# Patient Record
Sex: MALE
[De-identification: names, ages, dates, MRNs, and addresses within clinical notes are randomized per-mention and may not be internally consistent; named-entity substitution may affect disease eponyms.]

---
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
## 2022-01-27 ENCOUNTER — HOSPITAL ENCOUNTER (OUTPATIENT)
Dept: PHYSICAL THERAPY | Age: 22
Setting detail: THERAPIES SERIES
Discharge: HOME | End: 2022-01-27
Payer: COMMERCIAL

## 2022-01-27 PROCEDURE — 97161 PT EVAL LOW COMPLEX 20 MIN: CPT

## 2022-01-27 PROCEDURE — 97140 MANUAL THERAPY 1/> REGIONS: CPT

## 2022-01-27 PROCEDURE — 97112 NEUROMUSCULAR REEDUCATION: CPT

## 2022-01-27 NOTE — PLAN OF CARE
Laurakika GaganmikeAmarilis dickson  Phone: [phone]   Fax:     [phone]                                                       Physical Therapy Certification    Dear Referring Practitioner: Hailee Mcfarlane,    We had the pleasure of evaluating the following patient for physical therapy services at [address] [place] [address]. A summary of our findings can be found in the initial assessment below. This includes our plan of care. If you have any questions or concerns regarding these findings, please do not hesitate to contact me at the office phone number checked above.   Thank you for the referral.       Physician Signature:_______________________________Date:__________________  By signing above (or electronic signature), therapists plan is approved by physician      Patient: Leona Angel   : 2000   MRN: [id number]  Referring Physician: Referring Practitioner: Hailee Mcfarlane      Evaluation Date: 2022      Medical Diagnosis Information:  Diagnosis: Z48.89 encounter for other specified surgical aftercare   Treatment Diagnosis: M25.562, M25.662, M62.559, R53.1, R26.9                                         Insurance information: PT Insurance Information: BCBS; DED NOT MET; DN NOT COVERED; 80/20%; 60 VISITS/YEAR, 7 USED     Precautions/ Contra-indications: s/p L ACLr w/ patellar tendon graft,  DOS: 2021  Latex Allergy:  [x]NO      []YES  Preferred Language for Healthcare:   [x]English       []other:    C-SSRS Triggered by Intake questionnaire (Past 2 wk assessment ):   [x] No, Questionnaire did not trigger screening.   [] Yes, Patient intake triggered C-SSRS Screening      [] C-SSRS Screening completed  [] PCP notified via Epic     SUBJECTIVE: Patient stated complaint: Pt presents for evaluation after transferring care from PT clinic at home in [facility] following L ACLr w/ patellar tendon graft. Pt also reports MCL and LCL sprains. States he had an initial injury on Oct 26, 2021 while playing football. 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 Was on crutches after initial injury and received surgery when he went home for winter break. States he has been having a hard time restoring full knee ROM due to significant tightness through all parts of his leg and some pain in patellar tendon region. States he has been able to get it to almost touch the table after working on it. Still has some pain w/ quad activation. Ambulating w/ single crutch and knee immobilizer locked at available end range extension.     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[place] [address],[address] Level of Function: Independent with ADLs and IADLs; [employer] club football     OBJECTIVE:     ROM LEFT RIGHT   HIP Flex     HIP Abd     HIP Ext     HIP IR     HIP ER     Knee ext -18 0   Knee Flex 93 135   Ankle PF     Ankle DF     Ankle In     Ankle Ev     Strength  LEFT RIGHT   HIP Flexors     HIP Abductors 4 4   HIP Ext     Hip ER     Knee EXT (quad) NT 5   Knee Flex (HS) NT 5   Ankle DF     Ankle PF     Ankle Inv     Ankle EV          Circumference  Mid apex  7 cm prox             Reflexes/Sensation:    [x]Dermatomes/Myotomes intact    [x]Reflexes equal and normal bilaterally   []Other:    Joint mobility: significant mobility restrictions L knee and patella   []Normal    [x]Hypo   []Hyper    Palpation: TTP L IT band; patellar tendon    Functional Mobility/Transfers: unable to assess squat    Posture: WFL    Bandages/Dressings/Incisions: incisions healed    Gait: (include devices/WB status) unable to ambulate without crutch 2/2 lack of TKE and decreased quad strength    Orthopedic Special Tests: n/a                       [x] Patient history, allergies, meds reviewed. Medical chart reviewed. See intake form. Review Of Systems (ROS):  [x]Performed Review of systems (Integumentary, CardioPulmonary, Neurological) by intake and observation. Intake form has been scanned into medical record. Patient has been instructed to contact their primary care physician regarding ROS issues if not already being addressed at this time.       Co-morbidities/Complexities (which will affect course of rehabilitation):   []None           Arthritic conditions   []Rheumatoid arthritis (M05.9)  []Osteoarthritis (M19.91)   Cardiovascular conditions   [x]Hypertension (I10)  []Hyperlipidemia (E78.5)  []Angina pectoris (I20)  []Atherosclerosis (I70)  []CVA Musculoskeletal conditions   []Disc pathology   []Congenital spine pathologies   []Prior surgical intervention  []Osteoporosis (M81.8)  []Osteopenia (M85.8)   Endocrine conditions   []Hypothyroid (E03.9)  []Hyperthyroid Gastrointestinal conditions   []Constipation (M80.93)   Metabolic conditions   []Morbid obesity (E66.01)  []Diabetes type 1(E10.65) or 2 (E11.65)   []Neuropathy (G60.9)     Pulmonary conditions   []Asthma (J45)  []Coughing   []COPD (J44.9)   Psychological Disorders  []Anxiety (F41.9)  []Depression (F32.9)   []Other:   []Other:          Barriers to/and or personal factors that will affect rehab potential:              []Age  []Sex    []Smoker              []Motivation/Lack of Motivation                        []Co-Morbidities              []Cognitive Function, education/learning barriers              []Environmental, home barriers              []profession/work barriers  []past PT/medical experience  []other:  Justification:     Falls Risk Assessment (30 days):   [x] Falls Risk assessed and no intervention required. [] Falls Risk assessed and Patient requires intervention due to being higher risk   TUG score (>12s at risk):     [] Falls education provided, including         ASSESSMENT: Pt presents for evaluation of L knee s/p 6 weeks ACLr w/ patellar tendon graft. Pt presents today w/ significant ROM, mobility and strength deficits for post-op timeline, likely worse 2/2 waiting on surgery without any intervention. Patellar tendon mobility is very limited, in turn restricting patellar mobility. Still lacking quad activation preventing pt from being able to progress ambulation without assistance. Able to improve extension to 7 deg from 0 by end of session w/ overpressure. Pt may benefit from DN patellar tendon, quad tendon, and IT band, will request permission from referring provider. Pt to benefit from skilled PT to improve ROM, mobility, and strength limitations for assist in return to sport.    Functional Impairments:     [x]Noted lumbar/proximal hip/LE hypomobility   [x]Decreased LE functional ROM   [x]Decreased core/proximal hip strength and neuromuscular control   [x]Decreased LE functional strength   [x]Reduced balance/proprioceptive control   []other:      Functional Activity Limitations (from functional questionnaire and intake)   [x]Reduced ability to tolerate prolonged functional positions   [x]Reduced ability or difficulty with changes of positions or transfers between positions   [x]Reduced ability to maintain good posture and demonstrate good body mechanics with sitting, bending, and lifting   [x]Reduced ability to sleep   [x] Reduced ability or tolerance with driving and/or computer work   [x]Reduced ability to perform lifting, carrying tasks   [x]Reduced ability to squat   []Reduced ability to forward bend   [x]Reduced ability to ambulate prolonged functional periods/distances/surfaces   [x]Reduced ability to ascend/descend stairs   [x]Reduced ability to run, hop or jump   []other:     Participation Restrictions   [x]Reduced participation in self care activities   [x]Reduced participation in home management activities   []Reduced participation in work activities   [x]Reduced participation in social activities. [x]Reduced participation in sport activities. Classification :    [x]Signs/symptoms consistent with post-surgical status including decreased ROM, strength and function.    []Signs/symptoms consistent with joint sprain/strain   []Signs/symptoms consistent with patella-femoral syndrome   []Signs/symptoms consistent with knee OA/hip OA   []Signs/symptoms consistent with internal derangement of knee/Hip   []Signs/symptoms consistent with functional hip weakness/NMR control      []Signs/symptoms consistent with tendinitis/tendinosis    []signs/symptoms consistent with pathology which may benefit from Dry needling      []other:      Prognosis/Rehab Potential:      []Excellent   []Good    [x]Fair   []Poor    Tolerance of evaluation/treatment:    []Excellent   [x]Good    []Fair   []Poor    Physical Therapy Evaluation Complexity Justification  [x] A history of present problem with:  [x] no personal factors and/or comorbidities that impact the plan of care;  []1-2 personal factors and/or comorbidities that impact the plan of care  []3 personal factors and/or comorbidities that impact the plan of care  [x] An examination of body systems using standardized tests and measures addressing any of the following: body structures and functions (impairments), activity limitations, and/or participation restrictions;:  [x] a total of 1-2 or more elements   [] a total of 3 or more elements   [] a total of 4 or more elements   [x] A clinical presentation with:  [x] stable and/or uncomplicated characteristics   [] evolving clinical presentation with changing characteristics  [] unstable and unpredictable characteristics;   [x] Clinical decision making of [x] low, [] moderate, [] high complexity using standardized patient assessment instrument and/or measurable assessment of functional outcome. [x] EVAL (LOW) 21662 (typically 20 minutes face-to-face)  [] EVAL (MOD) 18029 (typically 30 minutes face-to-face)  [] EVAL (HIGH) 34774 (typically 45 minutes face-to-face)  [] RE-EVAL     PLAN:  Frequency/Duration:  2-3 days per week for 8 Weeks:  Interventions:  [x]  Therapeutic exercise including: strength training, ROM, for Lower extremity and core   [x]  NMR activation and proprioception for LE, Glutes and Core   [x]  Manual therapy as indicated for LE, Hip and spine to include: Dry Needling/IASTM, STM, PROM, Gr I-IV mobilizations, manipulation. [x] Modalities as needed that may include: thermal agents, E-stim, Biofeedback, US, iontophoresis as indicated  [x] Patient education on joint protection, postural re-education, activity modification, progression of HEP. HEP instruction: (see scanned forms)    GOALS:  Patient stated goal: \"get my leg straight and be able to walk without crutch\"  [] Progressing: [] Met: [x] Not Met: [] Adjusted    Therapist goals for Patient:   Short Term Goals: To be achieved in: 2 weeks  1. Independent in HEP and progression per patient tolerance, in order to prevent re-injury. [] Progressing: [] Met: [x] Not Met: [] Adjusted  2. Patient will have a decrease in pain to facilitate improvement in movement, function, and ADLs as indicated by Functional Deficits. [] Progressing: [] Met: [x] Not Met: [] Adjusted    Long Term Goals: To be achieved in: 8 weeks  1. Disability index score of 10% or less for the LEFS to assist with reaching prior level of function. [] Progressing: [] Met: [x] Not Met: [] Adjusted  2. Patient will demonstrate increased AROM to full L knee to allow for proper joint functioning as indicated by patients Functional Deficits.    [] Progressing: [] Met: [x] Not Met: [] Adjusted  3. Patient will demonstrate an increase in Strength to good proximal hip strength and control, within 5lb HHD in LE to allow for proper functional mobility as indicated by patients Functional Deficits. [] Progressing: [] Met: [x] Not Met: [] Adjusted  4. Patient will return to daily functional activities without increased symptoms or restriction. [] Progressing: [] Met: [x] Not Met: [] Adjusted  5. Pt will be able to ambulate without crutch and with full knee extension and quad recruitment without increased symptoms or restrictions.    [] Progressing: [] Met: [x] Not Met: [] Adjusted     Electronically signed by:  jT Rangel PT

## 2022-01-31 ENCOUNTER — HOSPITAL ENCOUNTER (OUTPATIENT)
Dept: PHYSICAL THERAPY | Age: 22
Setting detail: THERAPIES SERIES
Discharge: HOME | End: 2022-01-31
Payer: COMMERCIAL

## 2022-01-31 PROCEDURE — 97110 THERAPEUTIC EXERCISES: CPT

## 2022-01-31 PROCEDURE — 97112 NEUROMUSCULAR REEDUCATION: CPT

## 2022-01-31 PROCEDURE — 97140 MANUAL THERAPY 1/> REGIONS: CPT

## 2022-01-31 NOTE — FLOWSHEET NOTE
[place]  [address] [facility]  Phone: [phone] Fax: [phone]    Physical Therapy Treatment Note/ Progress Report:     Date:  2022    Patient Name:  Kathia Bull    :  2000  MRN: [id number]  Restrictions/Precautions:    Medical/Treatment Diagnosis Information:  · Diagnosis: Z48.89 encounter for other specified surgical aftercare  · Treatment Diagnosis: M25.562, M25.662, M62.559, R53.1, F85.2  Insurance/Certification information:  PT Insurance Information: BCBS; DED NOT MET; DN NOT COVERED; 80/20%; 60 VISITS/YEAR, 7 USED  Physician Information:  Referring Practitioner: Holly Estrada of care signed (Y/N):     Date of Patient follow up with Physician:      Progress Report: []  Yes  []  No     Date Range for reporting period:  Beginnin2022  Ending:      Progress report due (10 Rx/or 30 days whichever is less): 3/05/9802     Recertification due (POC duration/ or 90 days whichever is less): 2022     Visit # Insurance Allowable Auth Needed   1 60 ( 7 used) []Yes   []No     Latex Allergy:  [x]NO      []YES  Preferred Language for Healthcare:   [x]English       []other:  Functional Scale: LEFS: 33/80  Date assessed: 2022    Pain level:  0-4/10     SUBJECTIVE:  See eval    OBJECTIVE: See eval   Observation:    Test measurements:      RESTRICTIONS/PRECAUTIONS: s/p L ACLr w/ patellar tendon graft,  DOS: 2021    Exercises/Interventions:     Therapeutic Ex (96824)   Min: Sets/sec Reps Notes/CUES   Retro Stepper/BIKE 5'     Alter G      BFR      Sportcord March      3 way SLR      SAQ      Clam ABD      Hip Ext Lorrine Rounds      BOSU fwd/side lunge      BOSU squat      Leg Press Iso/Con/Ecc 0-      Cybex HS curl      TKE 5'' 15 3 pl   Glute side walks      RDL      Slide Lunge      Slide HS eccentrics      Step ups/ecc step down      Swissball wall rolls- in SLS- hip drive      Quad hip ext/wall-ball rolls Quad set 10 10''    Seated heel slides 10 10''    Manual Intervention (75446)  Min:      Knee mobs/PROM 5'  End range flex/ext   Tib/Fem Mobs 5'     Patella Mobs 5'  stiff   Ankle mobs      STM 10'  Patellar/quad tendon; IT band         NMR re-education (08077)  Min:   CUES NEEDED   [place]/Biofeedback 10/10 15'  Quad set; 2 channel co contract   BFR      G. Med activation      Hip Ext full ROM/ G. Activation      Bosu Bal and Prop- G Med      Single leg stance/Balance/Prop      Bosu Retro G. Med act      Prone Hip froggers- sliders/elevated            Therapeutic Activity (97109)  Min:      Ladders      Plyos      Dynamic Balance                            Therapeutic Exercise and NMR EXR  [x] (40088) Provided verbal/tactile cueing for activities related to strengthening, flexibility, endurance, ROM for improvements in LE, proximal hip, and core control with self care, mobility, lifting, ambulation. [x] (42025) Provided verbal/tactile cueing for activities related to improving balance, coordination, kinesthetic sense, posture, motor skill, proprioception  to assist with LE, proximal hip, and core control in self care, mobility, lifting, ambulation and eccentric single leg control.      NMR and Therapeutic Activities:    [x] (84326 or 02613) Provided verbal/tactile cueing for activities related to improving balance, coordination, kinesthetic sense, posture, motor skill, proprioception and motor activation to allow for proper function of core, proximal hip and LE with self care and ADLs and functional mobility.   [] (89647) Gait Re-education- Provided training and instruction to the patient for proper LE, core and proximal hip recruitment and positioning and eccentric body weight control with ambulation re-education including up and down stairs     Home Exercise Program:    [x] (09984) Reviewed/Progressed HEP activities related to strengthening, flexibility, endurance, ROM of core, proximal hip and LE for functional self-care, mobility, lifting and ambulation/stair navigation   [] (10378)Reviewed/Progressed HEP activities related to improving balance, coordination, kinesthetic sense, posture, motor skill, proprioception of core, proximal hip and LE for self care, mobility, lifting, and ambulation/stair navigation      Manual Treatments:  PROM / STM / Oscillations-Mobs:  G-I, II, III, IV (PA's, Inf., Post.)  [x] (02787) Provided manual therapy to mobilize LE, proximal hip and/or LS spine soft tissue/joints for the purpose of modulating pain, promoting relaxation,  increasing ROM, reducing/eliminating soft tissue swelling/inflammation/restriction, improving soft tissue extensibility and allowing for proper ROM for normal function with self care, mobility, lifting and ambulation. Modalities:     [] GAME READY (VASO)- for significant edema, swelling, pain control. Charges:  Timed Code Treatment Minutes: 30   Total Treatment Minutes: 60      [x] EVAL (LOW) 43325 (typically 20 minutes face-to-face)  [] EVAL (MOD) 17601 (typically 30 minutes face-to-face)  [] EVAL (HIGH) 61331 (typically 45 minutes face-to-face)  [] RE-EVAL     [] AY(26824) x     [] DRY NEEDLE 1 OR 2 MUSCLES  [x] NMR (67077) x  1   [] DRY NEEDLE 3+ MUSCLES  [x] Manual (31075) x  1     [] TA (39363) x     [] Mech Traction (27378)  [] ES(attended) (33346)     [] ES (un) (69995):   [] VASO (05099)  [] Other:      GOALS:  Patient stated goal: \"get my leg straight and be able to walk without crutch\"  []? Progressing: []? Met: [x]? Not Met: []? Adjusted     Therapist goals for Patient:   Short Term Goals: To be achieved in: 2 weeks  1. Independent in HEP and progression per patient tolerance, in order to prevent re-injury. []? Progressing: []? Met: [x]? Not Met: []? Adjusted  2. Patient will have a decrease in pain to facilitate improvement in movement, function, and ADLs as indicated by Functional Deficits. []? Progressing: []? Met: [x]?  Not Met: []? Adjusted     Long Term Goals: To be achieved in: 8 weeks  1. Disability index score of 10% or less for the LEFS to assist with reaching prior level of function. []? Progressing: []? Met: [x]? Not Met: []? Adjusted  2. Patient will demonstrate increased AROM to full L knee to allow for proper joint functioning as indicated by patients Functional Deficits. []? Progressing: []? Met: [x]? Not Met: []? Adjusted  3. Patient will demonstrate an increase in Strength to good proximal hip strength and control, within 5lb HHD in LE to allow for proper functional mobility as indicated by patients Functional Deficits. []? Progressing: []? Met: [x]? Not Met: []? Adjusted  4. Patient will return to daily functional activities without increased symptoms or restriction. []? Progressing: []? Met: [x]? Not Met: []? Adjusted  5. Pt will be able to ambulate without crutch and with full knee extension and quad recruitment without increased symptoms or restrictions. []? Progressing: []? Met: [x]? Not Met: []? Adjusted      ASSESSMENT:  See eval    Return to Play: (if applicable)   [x]  Stage 1: Intro to Strength   []  Stage 2: Return to Run and Strength   []  Stage 3: Return to Jump and Strength   []  Stage 4: Dynamic Strength and Agility   []  Stage 5: Sport Specific Training     []  Ready to Return to Play, Meets All Above Stages   []  Not Ready for Return to Sports   Comments:            Treatment/Activity Tolerance:  [x] Patient tolerated treatment well [] Patient limited by fatique  [] Patient limited by pain  [] Patient limited by other medical complications  [] Other:     Overall Progression Towards Functional goals/ Treatment Progress Update:  [] Patient is progressing as expected towards functional goals listed. [] Progression is slowed due to complexities/Impairments listed. [] Progression has been slowed due to co-morbidities.   [x] Plan just implemented, too soon to assess goals progression <30days   [] Goals require adjustment due to lack of progress  [] Patient is not progressing as expected and requires additional follow up with physician  [] Other    Prognosis for POC: [x] Good [] Fair  [] Poor    Patient requires continued skilled intervention: [x] Yes  [] No        PLAN: See eval  [] Continue per plan of care [] Alter current plan (see comments)  [x] Plan of care initiated [] Hold pending MD visit [] Discharge    Electronically signed by: Irene Dwyer PT    Note: If patient does not return for scheduled/recommended follow up visits, this note will serve as a discharge from care along with the most recent update on progress.

## 2022-01-31 NOTE — FLOWSHEET NOTE
Glute side walks      RDL      Slide Lunge      Slide HS eccentrics      Step ups/ecc step down      Swissball wall rolls- in SLS- hip drive      Incline calf stretch 30'' 3    Seated hamstring stretch 30'' 3    Quad set 10'' 10    Seated heel slides 10'' 10    Manual Intervention (95933)  Min: 25      Knee mobs/PROM 5'  End range flex/ext   Tib/Fem Mobs 5'     Patella Mobs 5'  stiff   Ankle mobs      STM 10'  Patellar/quad tendon; IT band, HS         NMR re-education (50840)  Min: 15   CUES NEEDED   [place]/Biofeedback 10/10 15'  Quad set; 2 channel co contract   BFR      G. Med activation      Hip Ext full ROM/ G. Activation      Bosu Bal and Prop- G Med      Single leg stance/Balance/Prop      Bosu Retro G. Med act      Prone Hip froggers- sliders/elevated            Therapeutic Activity (58221)  Min:      Ladders      DailyStrengthos      Dynamic Balance                            Therapeutic Exercise and NMR EXR  [x] (33794) Provided verbal/tactile cueing for activities related to strengthening, flexibility, endurance, ROM for improvements in LE, proximal hip, and core control with self care, mobility, lifting, ambulation. [x] (63310) Provided verbal/tactile cueing for activities related to improving balance, coordination, kinesthetic sense, posture, motor skill, proprioception  to assist with LE, proximal hip, and core control in self care, mobility, lifting, ambulation and eccentric single leg control.      NMR and Therapeutic Activities:    [x] (07561 or 89614) Provided verbal/tactile cueing for activities related to improving balance, coordination, kinesthetic sense, posture, motor skill, proprioception and motor activation to allow for proper function of core, proximal hip and LE with self care and ADLs and functional mobility.   [] (04248) Gait Re-education- Provided training and instruction to the patient for proper LE, core and proximal hip recruitment and positioning and eccentric body weight control with ambulation re-education including up and down stairs     Home Exercise Program:    [x] (07339) Reviewed/Progressed HEP activities related to strengthening, flexibility, endurance, ROM of core, proximal hip and LE for functional self-care, mobility, lifting and ambulation/stair navigation   [] (63934)Reviewed/Progressed HEP activities related to improving balance, coordination, kinesthetic sense, posture, motor skill, proprioception of core, proximal hip and LE for self care, mobility, lifting, and ambulation/stair navigation      Manual Treatments:  PROM / STM / Oscillations-Mobs:  G-I, II, III, IV (PA's, Inf., Post.)  [x] (97583) Provided manual therapy to mobilize LE, proximal hip and/or LS spine soft tissue/joints for the purpose of modulating pain, promoting relaxation,  increasing ROM, reducing/eliminating soft tissue swelling/inflammation/restriction, improving soft tissue extensibility and allowing for proper ROM for normal function with self care, mobility, lifting and ambulation. Modalities:     [] GAME READY (VASO)- for significant edema, swelling, pain control. Charges:  Timed Code Treatment Minutes: 45   Total Treatment Minutes: 60      [] EVAL (LOW) 94902 (typically 20 minutes face-to-face)  [] EVAL (MOD) 96569 (typically 30 minutes face-to-face)  [] EVAL (HIGH) 86255 (typically 45 minutes face-to-face)  [] RE-EVAL     [x] VH(59934) x 1    [] DRY NEEDLE 1 OR 2 MUSCLES  [x] NMR (67669) x  1   [] DRY NEEDLE 3+ MUSCLES  [x] Manual (77647) x  1     [] TA (08379) x     [] Mech Traction (52843)  [] ES(attended) (20683)     [] ES (un) (30105):   [] VASO (14762)  [] Other:      GOALS:  Patient stated goal: \"get my leg straight and be able to walk without crutch\"  []? Progressing: []? Met: [x]? Not Met: []? Adjusted     Therapist goals for Patient:   Short Term Goals: To be achieved in: 2 weeks  1. Independent in HEP and progression per patient tolerance, in order to prevent re-injury. []?  Progressing: []? Met: [x]? Not Met: []? Adjusted  2. Patient will have a decrease in pain to facilitate improvement in movement, function, and ADLs as indicated by Functional Deficits. []? Progressing: []? Met: [x]? Not Met: []? Adjusted     Long Term Goals: To be achieved in: 8 weeks  1. Disability index score of 10% or less for the LEFS to assist with reaching prior level of function. []? Progressing: []? Met: [x]? Not Met: []? Adjusted  2. Patient will demonstrate increased AROM to full L knee to allow for proper joint functioning as indicated by patients Functional Deficits. []? Progressing: []? Met: [x]? Not Met: []? Adjusted  3. Patient will demonstrate an increase in Strength to good proximal hip strength and control, within 5lb HHD in LE to allow for proper functional mobility as indicated by patients Functional Deficits. []? Progressing: []? Met: [x]? Not Met: []? Adjusted  4. Patient will return to daily functional activities without increased symptoms or restriction. []? Progressing: []? Met: [x]? Not Met: []? Adjusted  5. Pt will be able to ambulate without crutch and with full knee extension and quad recruitment without increased symptoms or restrictions. []? Progressing: []? Met: [x]? Not Met: []? Adjusted      ASSESSMENT:  Fair tolerance to treatment. Pt started 20-93 deg beginning of session, able to achieve 8-105 by end of session w/ overpressure, does have firm to hard end feel at end range extension. Quad firing improving in available range, however pt demonstrating proximal hip compensation. Emphasized importance of consistency w/ HEP to prevent worsening range as pt is significantly behind for post op timeline.      Return to Play: (if applicable)   [x]  Stage 1: Intro to Strength   []  Stage 2: Return to Run and Strength   []  Stage 3: Return to Jump and Strength   []  Stage 4: Dynamic Strength and Agility   []  Stage 5: Sport Specific Training     []  Ready to Return to Play, Meets All Above Stages   []  Not Ready for Return to Sports   Comments:            Treatment/Activity Tolerance:  [x] Patient tolerated treatment well [] Patient limited by fatique  [] Patient limited by pain  [] Patient limited by other medical complications  [] Other:     Overall Progression Towards Functional goals/ Treatment Progress Update:  [] Patient is progressing as expected towards functional goals listed. [x] Progression is slowed due to complexities/Impairments listed. [] Progression has been slowed due to co-morbidities. [] Plan just implemented, too soon to assess goals progression <30days   [] Goals require adjustment due to lack of progress  [] Patient is not progressing as expected and requires additional follow up with physician  [] Other    Prognosis for POC: [x] Good [] Fair  [] Poor    Patient requires continued skilled intervention: [x] Yes  [] No        PLAN: See eval  [x] Continue per plan of care [] Alter current plan (see comments)  [x] Plan of care initiated [] Hold pending MD visit [] Discharge    Electronically signed by: Kenny Martinez, PT    Note: If patient does not return for scheduled/recommended follow up visits, this note will serve as a discharge from care along with the most recent update on progress.

## 2022-02-02 ENCOUNTER — HOSPITAL ENCOUNTER (OUTPATIENT)
Dept: PHYSICAL THERAPY | Age: 22
Setting detail: THERAPIES SERIES
Discharge: HOME | End: 2022-02-02
Payer: COMMERCIAL

## 2022-02-02 PROCEDURE — 97110 THERAPEUTIC EXERCISES: CPT

## 2022-02-02 PROCEDURE — 97140 MANUAL THERAPY 1/> REGIONS: CPT

## 2022-02-02 PROCEDURE — 97112 NEUROMUSCULAR REEDUCATION: CPT

## 2022-02-02 NOTE — FLOWSHEET NOTE
[place] [address] [address]  Phone: [phone] Fax: [phone]    Physical Therapy Treatment Note/ Progress Report:     Date:  2022    Patient Name:  Priscilla Vigil    :  2000  MRN: [id number]  Restrictions/Precautions:    Medical/Treatment Diagnosis Information:  · Diagnosis: Z48.89 encounter for other specified surgical aftercare  · Treatment Diagnosis: M25.562, M25.662, M62.559, R53.1, W14.6  Insurance/Certification information:  PT Insurance Information: BCBS; DED NOT MET; DN NOT COVERED; 80/20%; 60 VISITS/YEAR, 7 USED  Physician Information:  Referring Practitioner: Perla Carver of care signed (Y/N):     Date of Patient follow up with Physician:      Progress Report: []  Yes  [x]  No     Date Range for reporting period:  Beginnin2022  Ending:      Progress report due (10 Rx/or 30 days whichever is less): 6366     Recertification due (POC duration/ or 90 days whichever is less): 2022     Visit # Insurance Allowable Auth Needed   3 60 ( 7 used) []Yes   []No     Latex Allergy:  [x]NO      []YES  Preferred Language for Healthcare:   [x]English       []other:  Functional Scale: LEFS: 33/80  Date assessed: 2022    Pain level:  0-4/10     SUBJECTIVE: States he had some pain through the knee cap after last session. Feeling tight through HS right now. Has done HEP motion 3x today.      OBJECTIVE:    Observation:    Test measurements:      RESTRICTIONS/PRECAUTIONS: s/p L ACLr w/ patellar tendon graft,  DOS: 2021    Exercises/Interventions:     Therapeutic Ex (44266)   Min: 20 Sets/sec Reps Notes/CUES   Retro Stepper/BIKE 8'     Alter G      BFR      Sportcord March      3 way SLR      LAQ 5'' 10 20 deg ext lag   Clam ABD      Hip Ext /table      BOSU fwd/side lunge 10 10''    BOSU squat      Leg Press Iso/Con/Ecc 0- 10'' 10 iso 30-45 deg   Cybex HS curl      TKE 5'' 15 3 pl   Glute side walks      RDL      Slide Lunge      Slide HS eccentrics      Step ups/ecc step down      Swissball wall rolls- in SLS- hip drive      Incline calf stretch 30'' 5    Seated hamstring stretch 30'' 3    Quad set 10'' 10    Seated heel slides 10'' 10    Manual Intervention (45195)  Min: 25      Knee mobs/PROM 5'  End range flex/ext   Tib/Fem Mobs 5'     Patella Mobs 5'  Restricted superiorly   Ankle mobs      STM 10'  Patellar/quad tendon; IT band, prone HS         NMR re-education (15083)  Min: 15   CUES NEEDED   [place]/Biofeedback 10/10   Quad set; 2 channel co contract   BFR      G. Med activation      Hip Ext full ROM/ G. Activation      Bosu Bal and Prop- G Med      Single leg stance/Balance/Prop      Bosu Retro G. Med act      Prone Hip froggers- sliders/elevated            Therapeutic Activity (64515)  Min:      Ladders      Plyos      Dynamic Balance                            Therapeutic Exercise and NMR EXR  [x] (73241) Provided verbal/tactile cueing for activities related to strengthening, flexibility, endurance, ROM for improvements in LE, proximal hip, and core control with self care, mobility, lifting, ambulation. [x] (39306) Provided verbal/tactile cueing for activities related to improving balance, coordination, kinesthetic sense, posture, motor skill, proprioception  to assist with LE, proximal hip, and core control in self care, mobility, lifting, ambulation and eccentric single leg control.      NMR and Therapeutic Activities:    [x] (05283 or 05284) Provided verbal/tactile cueing for activities related to improving balance, coordination, kinesthetic sense, posture, motor skill, proprioception and motor activation to allow for proper function of core, proximal hip and LE with self care and ADLs and functional mobility.   [] (75018) Gait Re-education- Provided training and instruction to the patient for proper LE, core and proximal hip recruitment and positioning and eccentric body weight control with ambulation re-education including up and down stairs     Home Exercise Program:    [x] (94994) Reviewed/Progressed HEP activities related to strengthening, flexibility, endurance, ROM of core, proximal hip and LE for functional self-care, mobility, lifting and ambulation/stair navigation   [] (57014)Reviewed/Progressed HEP activities related to improving balance, coordination, kinesthetic sense, posture, motor skill, proprioception of core, proximal hip and LE for self care, mobility, lifting, and ambulation/stair navigation      Manual Treatments:  PROM / STM / Oscillations-Mobs:  G-I, II, III, IV (PA's, Inf., Post.)  [x] (05074) Provided manual therapy to mobilize LE, proximal hip and/or LS spine soft tissue/joints for the purpose of modulating pain, promoting relaxation,  increasing ROM, reducing/eliminating soft tissue swelling/inflammation/restriction, improving soft tissue extensibility and allowing for proper ROM for normal function with self care, mobility, lifting and ambulation. Modalities:     [x] GAME READY (VASO)- for significant edema, swelling, pain control. Charges:  Timed Code Treatment Minutes: 45   Total Treatment Minutes: 60      [] EVAL (LOW) 91283 (typically 20 minutes face-to-face)  [] EVAL (MOD) 43400 (typically 30 minutes face-to-face)  [] EVAL (HIGH) 47216 (typically 45 minutes face-to-face)  [] RE-EVAL     [x] YF(82217) x 1    [] DRY NEEDLE 1 OR 2 MUSCLES  [x] NMR (85688) x  1   [] DRY NEEDLE 3+ MUSCLES  [x] Manual (95101) x  1     [] TA (37071) x     [] Mech Traction (11580)  [] ES(attended) (41707)     [] ES (un) (75223):   [] VASO (49323)  [] Other:      GOALS:  Patient stated goal: \"get my leg straight and be able to walk without crutch\"  []? Progressing: []? Met: [x]? Not Met: []? Adjusted     Therapist goals for Patient:   Short Term Goals: To be achieved in: 2 weeks  1. Independent in HEP and progression per patient tolerance, in order to prevent re-injury.    []? Progressing: []? Met: [x]? Not Met: []? Adjusted  2. Patient will have a decrease in pain to facilitate improvement in movement, function, and ADLs as indicated by Functional Deficits. []? Progressing: []? Met: [x]? Not Met: []? Adjusted     Long Term Goals: To be achieved in: 8 weeks  1. Disability index score of 10% or less for the LEFS to assist with reaching prior level of function. []? Progressing: []? Met: [x]? Not Met: []? Adjusted  2. Patient will demonstrate increased AROM to full L knee to allow for proper joint functioning as indicated by patients Functional Deficits. []? Progressing: []? Met: [x]? Not Met: []? Adjusted  3. Patient will demonstrate an increase in Strength to good proximal hip strength and control, within 5lb HHD in LE to allow for proper functional mobility as indicated by patients Functional Deficits. []? Progressing: []? Met: [x]? Not Met: []? Adjusted  4. Patient will return to daily functional activities without increased symptoms or restriction. []? Progressing: []? Met: [x]? Not Met: []? Adjusted  5. Pt will be able to ambulate without crutch and with full knee extension and quad recruitment without increased symptoms or restrictions. []? Progressing: []? Met: [x]? Not Met: []? Adjusted      ASSESSMENT:  Fair tolerance to treatment. Pt started 17-95 deg beginning of session, able to achieve 7-107 by end of session w/ overpressure, does have firm to hard end feel at end range extension. Quad firing improving in available range. Able to improve patellar mobility slightly today w/ pt demonstrating improved gait pattern and quad recruitment in available range. Emphasized importance of consistency w/ HEP to prevent worsening range as pt is significantly behind for post op timeline.      Return to Play: (if applicable)   [x]  Stage 1: Intro to Strength   []  Stage 2: Return to Run and Strength   []  Stage 3: Return to Jump and Strength   []  Stage 4: Dynamic Strength and Agility   []  Stage 5: Sport Specific Training     []  Ready to Return to Play, [employer]   []  Not Ready for Return to Sports   Comments:            Treatment/Activity Tolerance:  [x] Patient tolerated treatment well [] Patient limited by fatique  [] Patient limited by pain  [] Patient limited by other medical complications  [] Other:     Overall Progression Towards Functional goals/ Treatment Progress Update:  [] Patient is progressing as expected towards functional goals listed. [x] Progression is slowed due to complexities/Impairments listed. [] Progression has been slowed due to co-morbidities. [] Plan just implemented, too soon to assess goals progression <30days   [] Goals require adjustment due to lack of progress  [] Patient is not progressing as expected and requires additional follow up with physician  [] Other    Prognosis for POC: [x] Good [] Fair  [] Poor    Patient requires continued skilled intervention: [x] Yes  [] No        PLAN: See eval  [x] Continue per plan of care [] Alter current plan (see comments)  [] Plan of care initiated [] Hold pending MD visit [] Discharge    Electronically signed by: May Hartley PT    Note: If patient does not return for scheduled/recommended follow up visits, this note will serve as a discharge from care along with the most recent update on progress.

## 2022-02-07 ENCOUNTER — HOSPITAL ENCOUNTER (OUTPATIENT)
Dept: PHYSICAL THERAPY | Age: 22
Setting detail: THERAPIES SERIES
Discharge: HOME | End: 2022-02-07
Payer: COMMERCIAL

## 2022-02-07 PROCEDURE — 97110 THERAPEUTIC EXERCISES: CPT

## 2022-02-07 PROCEDURE — 97140 MANUAL THERAPY 1/> REGIONS: CPT

## 2022-02-07 PROCEDURE — 20560 NDL INSJ W/O NJX 1 OR 2 MUSC: CPT

## 2022-02-07 PROCEDURE — 97032 APPL MODALITY 1+ESTIM EA 15: CPT

## 2022-02-09 ENCOUNTER — APPOINTMENT (OUTPATIENT)
Dept: PHYSICAL THERAPY | Age: 22
End: 2022-02-09
Payer: COMMERCIAL

## 2022-02-09 NOTE — FLOWSHEET NOTE
[place] [address] [address]  Phone: [phone] Fax: [phone]    Physical Therapy Treatment Note/ Progress Report:     Date:  2022    Patient Name:  Lety Tobar    :  2000  MRN: [id number]  Restrictions/Precautions:    Medical/Treatment Diagnosis Information:  · Diagnosis: Z48.89 encounter for other specified surgical aftercare  · Treatment Diagnosis: M25.562, M25.662, M62.559, R53.1, C06.2  Insurance/Certification information:  PT Insurance Information: BCBS; DED NOT MET; DN NOT COVERED; 80/20%; 60 VISITS/YEAR, 7 USED  Physician Information:  Referring Practitioner: Samm Kwok  Plan of care signed (Y/N):     Date of Patient follow up with Physician:      Progress Report: []  Yes  [x]  No     Date Range for reporting period:  Beginnin2022  Ending:      Progress report due (10 Rx/or 30 days whichever is less): 4516     Recertification due (POC duration/ or 90 days whichever is less): 2022     Visit # Insurance Allowable Auth Needed   4 60 ( 7 used) []Yes   []No     Latex Allergy:  [x]NO      []YES  Preferred Language for Healthcare:   [x]English       []other:  Functional Scale: LEFS: 33/80  Date assessed: 2022    Pain level:  0-4/10     SUBJECTIVE: States he felt good after last session but gets tight again when he has to sit in class. Sees surgeon Wednesday for follow up.     OBJECTIVE:    Observation:    Test measurements:      RESTRICTIONS/PRECAUTIONS: s/p L ACLr w/ patellar tendon graft,  DOS: 2021    Exercises/Interventions:     Therapeutic Ex (29504)   Min: 20 Sets/sec Reps Notes/CUES   Retro Stepper/BIKE 8'     Alter G      BFR      Sportcord March      3 way SLR      LAQ 20 deg ext lag   Clam ABD      Hip Ext Maryagnes Lei      BOSU fwd/side lunge    BOSU squat    Leg Press Iso/Con/Ecc 0- iso 30-45 deg   Cybex HS curl    TKE 3 pl   Glute side walks      RDL      Slide Lunge      Slide HS eccentrics      Step ups/ecc step down      Prone leg hang  3'    Incline calf stretch 30'' 5    Seated hamstring stretch 30'' 3    Quad set 10'' 10    Seated heel slides 10'' 10    Manual Intervention (94963)  Min: 29      Knee mobs/PROM 5'  End range flex/ext   Tib/Fem Mobs 8'  Posteromedial, posterolateral   Patella Mobs 8'  Restricted superiorly   Ankle mobs      STM 8'  Posterior chain in prone leg hang; patellar tendon         NMR re-education (76990)  Min: 15   CUES NEEDED   [place]/Biofeedback 10/10   Quad set; 2 channel co contract   BFR      G. Med activation      Hip Ext full ROM/ G. Activation      Bosu Bal and Prop- G Med      Single leg stance/Balance/Prop      Bosu Retro G. Med act      Prone Hip froggers- sliders/elevated            Therapeutic Activity (66702)  Min:      Ladders      Plyos      Dynamic Balance                            Therapeutic Exercise and NMR EXR  [x] (71340) Provided verbal/tactile cueing for activities related to strengthening, flexibility, endurance, ROM for improvements in LE, proximal hip, and core control with self care, mobility, lifting, ambulation. [x] (31883) Provided verbal/tactile cueing for activities related to improving balance, coordination, kinesthetic sense, posture, motor skill, proprioception  to assist with LE, proximal hip, and core control in self care, mobility, lifting, ambulation and eccentric single leg control.      NMR and Therapeutic Activities:    [x] (97263 or 39510) Provided verbal/tactile cueing for activities related to improving balance, coordination, kinesthetic sense, posture, motor skill, proprioception and motor activation to allow for proper function of core, proximal hip and LE with self care and ADLs and functional mobility.   [] (01709) Gait Re-education- Provided training and instruction to the patient for proper LE, core and proximal hip recruitment and positioning and eccentric body weight control with ambulation re-education including up and down stairs     Home Exercise Program:    [x] (04570) Reviewed/Progressed HEP activities related to strengthening, flexibility, endurance, ROM of core, proximal hip and LE for functional self-care, mobility, lifting and ambulation/stair navigation   [] (22220)Reviewed/Progressed HEP activities related to improving balance, coordination, kinesthetic sense, posture, motor skill, proprioception of core, proximal hip and LE for self care, mobility, lifting, and ambulation/stair navigation      Manual Treatments:  PROM / STM / Oscillations-Mobs:  G-I, II, III, IV (PA's, Inf., Post.)  [x] (41899) Provided manual therapy to mobilize LE, proximal hip and/or LS spine soft tissue/joints for the purpose of modulating pain, promoting relaxation,  increasing ROM, reducing/eliminating soft tissue swelling/inflammation/restriction, improving soft tissue extensibility and allowing for proper ROM for normal function with self care, mobility, lifting and ambulation. Spoke with Dr. Franky Valles  regarding the use of Dry Needling     Dry needling manual therapy: consisted on the placement of 8 needles in the following muscles:  HS, gastroc. A 30-50 mm needle was inserted, piston, rotated, and coned to produce intramuscular mobilization. These techniques were used to restore functional range of motion, reduce muscle spasm and induce healing in the corresponding musculature. (51325)  Clean Technique was utilized today while applying Dry needling treatment. The treatment sites where cleaned with 70% solution of  isopropyl alcohol . The PT washed their hands and utilized treatment gloves along with hand  prior to inserting the needles. All needles where removed and discarded in the appropriate sharps container. MD has given verbal and/or written approval for this treatment.      Attended low frequency (1-20Hz) electrical stimulation was utilized in conjunction with Dry Needling:  the Estim was manipulated between all above needles for a period of 10 min. at 4-6 volts. The low frequency electrical stimulation was used to help reduce muscle spasm and help to interrupt /[place] the pain cycle. (33768)       Modalities:     [x] GAME READY (VASO)- for significant edema, swelling, pain control. Charges:  Timed Code Treatment Minutes: 39   Total Treatment Minutes: 60      [] EVAL (LOW) 44169 (typically 20 minutes face-to-face)  [] EVAL (MOD) 48561 (typically 30 minutes face-to-face)  [] EVAL (HIGH) 61082 (typically 45 minutes face-to-face)  [] RE-EVAL     [x] IZ(60476) x 1    [x] DRY NEEDLE 1 OR 2 MUSCLES  [] NMR (99770) x     [] DRY NEEDLE 3+ MUSCLES  [x] Manual (75208) x  1     [] TA (55584) x     [] Mech Traction (61715)  [x] ES(attended) (57413)     [] ES (un) (77436):   [] VASO (71284)  [] Other:      GOALS:  Patient stated goal: \"get my leg straight and be able to walk without crutch\"  []? Progressing: []? Met: [x]? Not Met: []? Adjusted     Therapist goals for Patient:   Short Term Goals: To be achieved in: 2 weeks  1. Independent in HEP and progression per patient tolerance, in order to prevent re-injury. []? Progressing: []? Met: [x]? Not Met: []? Adjusted  2. Patient will have a decrease in pain to facilitate improvement in movement, function, and ADLs as indicated by Functional Deficits. []? Progressing: []? Met: [x]? Not Met: []? Adjusted     Long Term Goals: To be achieved in: 8 weeks  1. Disability index score of 10% or less for the LEFS to assist with reaching prior level of function. []? Progressing: []? Met: [x]? Not Met: []? Adjusted  2. Patient will demonstrate increased AROM to full L knee to allow for proper joint functioning as indicated by patients Functional Deficits. []? Progressing: []? Met: [x]? Not Met: []? Adjusted  3.  Patient will demonstrate an increase in Strength to good proximal hip strength and control, within 5lb HHD in LE to allow for proper functional mobility as indicated by patients Functional Deficits. []? Progressing: []? Met: [x]? Not Met: []? Adjusted  4. Patient will return to daily functional activities without increased symptoms or restriction. []? Progressing: []? Met: [x]? Not Met: []? Adjusted  5. Pt will be able to ambulate without crutch and with full knee extension and quad recruitment without increased symptoms or restrictions. []? Progressing: []? Met: [x]? Not Met: []? Adjusted      ASSESSMENT:  Fair tolerance to treatment. DN performed to distal hamstring and proximal calf to try to improve posterior soft tissue mobility and extension ROM as pt still has significant limitations and difficulty maintaining ROM gains inter session. Likely to benefit from DN in graft sight after 12 weeks postop to not interfere with healing of ligament repair. Patellar mobility slowly improving but still restricted. Discussed again importance of low load long duration positioning to try to improve mobility restrictions, especially since patient reports he did not have full ROM before surgery. Return to Play: (if applicable)   [x]  Stage 1: Intro to Strength   []  Stage 2: Return to Run and Strength   []  Stage 3: Return to Jump and Strength   []  Stage 4: Dynamic Strength and Agility   []  Stage 5: Sport Specific Training     []  Ready to Return to Play, [employer] Stages   []  Not Ready for Return to Sports   Comments:            Treatment/Activity Tolerance:  [x] Patient tolerated treatment well [] Patient limited by fatique  [] Patient limited by pain  [] Patient limited by other medical complications  [] Other:     Overall Progression Towards Functional goals/ Treatment Progress Update:  [] Patient is progressing as expected towards functional goals listed. [x] Progression is slowed due to complexities/Impairments listed. [] Progression has been slowed due to co-morbidities.   [] Plan just implemented, too soon to assess goals progression <30days   [] Goals require adjustment due to lack of progress  [] Patient is not progressing as expected and requires additional follow up with physician  [] Other    Prognosis for POC: [x] Good [] Fair  [] Poor    Patient requires continued skilled intervention: [x] Yes  [] No        PLAN: See eval  [x] Continue per plan of care [] Alter current plan (see comments)  [] Plan of care initiated [] Hold pending MD visit [] Discharge    Electronically signed by: Kenny Martinez, PT    Note: If patient does not return for scheduled/recommended follow up visits, this note will serve as a discharge from care along with the most recent update on progress.

## 2022-02-14 ENCOUNTER — HOSPITAL ENCOUNTER (OUTPATIENT)
Dept: PHYSICAL THERAPY | Age: 22
Setting detail: THERAPIES SERIES
Discharge: HOME | End: 2022-02-14
Payer: COMMERCIAL

## 2022-02-14 PROCEDURE — 97032 APPL MODALITY 1+ESTIM EA 15: CPT

## 2022-02-14 PROCEDURE — 20560 NDL INSJ W/O NJX 1 OR 2 MUSC: CPT

## 2022-02-14 PROCEDURE — 97140 MANUAL THERAPY 1/> REGIONS: CPT

## 2022-02-14 NOTE — FLOWSHEET NOTE
[place] [address] [facility]  Phone: [phone] Fax: [phone]    Physical Therapy Treatment Note/ Progress Report:     Date:  2022    Patient Name:  Aleida Bosch    :  2000  MRN: [id number]  Restrictions/Precautions:    Medical/Treatment Diagnosis Information:  · Diagnosis: Z48.89 encounter for other specified surgical aftercare  · Treatment Diagnosis: M25.562, M25.662, M62.559, R53.1, M41.2  Insurance/Certification information:  PT Insurance Information: Steve Hernandez; DED NOT MET; DN NOT COVERED; 80/20%; 60 VISITS/YEAR, 7 USED  Physician Information:  Referring Practitioner: Bhanu Quiroz of care signed (Y/N):     Date of Patient follow up with Physician:      Progress Report: []  Yes  [x]  No     Date Range for reporting period:  Beginnin2022  Ending:      Progress report due (10 Rx/or 30 days whichever is less): 3/64/8711     Recertification due (POC duration/ or 90 days whichever is less): 2022     Visit # Insurance Allowable Auth Needed   5 60 ( 7 used) []Yes   []No     Latex Allergy:  [x]NO      []YES  Preferred Language for Healthcare:   [x]English       []other:  Functional Scale: LEFS: 33/80  Date assessed: 2022    Pain level:  0-4/10     SUBJECTIVE: States he was given steroids at follow up w/ surgeon, will begin taking them today. Has another follow up in 4 weeks at which point they will determine if he needs to go back in for a scope/manip. Will increase PT frequency to 3x/week focusing mainly on restoring range.     OBJECTIVE:    Observation: ambulating w/ significant limitation in TKE at heel strike   Test measurements:      :  beginning of session      RESTRICTIONS/PRECAUTIONS: s/p L ACLr w/ patellar tendon graft,  DOS: 2021    Exercises/Interventions:     Therapeutic Ex (33266)   Min: 20 Sets/sec Reps Notes/CUES   Retro Stepper/BIKE 8'     Alter G      BFR      Sportcord March      3 way SLR      LAQ 20 deg ext lag   Clam ABD      Hip Ext Genie Amour      BOSU fwd/side lunge    BOSU squat    Leg Press Iso/Con/Ecc 0- iso 30-45 deg   Cybex HS curl    TKE 3 pl   Glute side walks      RDL      Slide Lunge      Slide HS eccentrics      Step ups/ecc step down      Prone leg hang  3'    Incline calf stretch 30'' 5    Seated hamstring stretch 30'' 3    Quad set 10'' 10    Seated heel slides 10'' 10    Manual Intervention (57360)  Min: 29      Knee mobs/PROM 5'  End range flex/ext   Tib/Fem Mobs 8'  Posteromedial, posterolateral   Patella Mobs 8'  Restricted superiorly   Ankle mobs      STM 8'  Posterior chain in prone leg hang; patellar tendon         NMR re-education (35983)  Min:    CUES NEEDED   [place]/Biofeedback 10/10   Quad set; 2 channel co contract   BFR      G. Med activation      Hip Ext full ROM/ G. Activation      Bosu Bal and Prop- G Med      Single leg stance/Balance/Prop      Bosu Retro G. Med act      Prone Hip froggers- sliders/elevated            Therapeutic Activity (99159)  Min:      Ladders      Plyos      Dynamic Balance                  DN + estim 10'  Medial/lateral HS and gastroc       Therapeutic Exercise and NMR EXR  [x] (07404) Provided verbal/tactile cueing for activities related to strengthening, flexibility, endurance, ROM for improvements in LE, proximal hip, and core control with self care, mobility, lifting, ambulation. [x] (83123) Provided verbal/tactile cueing for activities related to improving balance, coordination, kinesthetic sense, posture, motor skill, proprioception  to assist with LE, proximal hip, and core control in self care, mobility, lifting, ambulation and eccentric single leg control.      NMR and Therapeutic Activities:    [x] (92842 or 17241) Provided verbal/tactile cueing for activities related to improving balance, coordination, kinesthetic sense, posture, motor skill, proprioception and motor activation to allow for proper function of core, proximal hip and LE with self care and ADLs and functional mobility.   [] (82265) Gait Re-education- Provided training and instruction to the patient for proper LE, core and proximal hip recruitment and positioning and eccentric body weight control with ambulation re-education including up and down stairs     Home Exercise Program:    [x] (27775) Reviewed/Progressed HEP activities related to strengthening, flexibility, endurance, ROM of core, proximal hip and LE for functional self-care, mobility, lifting and ambulation/stair navigation   [] (32844)Reviewed/Progressed HEP activities related to improving balance, coordination, kinesthetic sense, posture, motor skill, proprioception of core, proximal hip and LE for self care, mobility, lifting, and ambulation/stair navigation      Manual Treatments:  PROM / STM / Oscillations-Mobs:  G-I, II, III, IV (PA's, Inf., Post.)  [x] (94279) Provided manual therapy to mobilize LE, proximal hip and/or LS spine soft tissue/joints for the purpose of modulating pain, promoting relaxation,  increasing ROM, reducing/eliminating soft tissue swelling/inflammation/restriction, improving soft tissue extensibility and allowing for proper ROM for normal function with self care, mobility, lifting and ambulation.        Spoke with Dr. Codi Bailey  regarding the use of Dry Needling      Dry needling manual therapy: consisted on the placement of 8 needles in the following muscles:  HS, gastroc.  A 30-50 mm needle was inserted, piston, rotated, and coned to produce intramuscular mobilization.  These techniques were used to restore functional range of motion, reduce muscle spasm and induce healing in the corresponding musculature.  (50993)  Clean Technique was utilized today while applying Dry needling treatment.  The treatment sites where cleaned with 70% solution of  isopropyl alcohol .  The PT washed their hands and utilized treatment gloves along with hand  prior to inserting the needles.  All needles where removed and discarded in the appropriate sharps container. MD has given verbal and/or written approval for this treatment.      Attended low frequency (1-20Hz) electrical stimulation was utilized in conjunction with Dry Needling:  the Estim was manipulated between all above needles for a period of 10 min.  at 4-6 volts.  The low frequency electrical stimulation was used to help reduce muscle spasm and help to interrupt /Diamond the pain cycle. (63575)        Modalities:     [x] GAME READY (VASO)- for significant edema, swelling, pain control. Charges:  Timed Code Treatment Minutes: 39   Total Treatment Minutes: 60      [] EVAL (LOW) 48106 (typically 20 minutes face-to-face)  [] EVAL (MOD) 69168 (typically 30 minutes face-to-face)  [] EVAL (HIGH) 71782 (typically 45 minutes face-to-face)  [] RE-EVAL     [] SM(81150) x 1    [x] DRY NEEDLE 1 OR 2 MUSCLES  [] NMR (06143) x     [] DRY NEEDLE 3+ MUSCLES  [x] Manual (14094) x  2     [] TA (96280) x     [] Mech Traction (48162)  [x] ES(attended) (74786)     [] ES (un) (85162):   [] VASO (67026)  [] Other:      GOALS:  Patient stated goal: \"get my leg straight and be able to walk without crutch\"  []? Progressing: []? Met: [x]? Not Met: []? Adjusted     Therapist goals for Patient:   Short Term Goals: To be achieved in: 2 weeks  1. Independent in HEP and progression per patient tolerance, in order to prevent re-injury. []? Progressing: []? Met: [x]? Not Met: []? Adjusted  2. Patient will have a decrease in pain to facilitate improvement in movement, function, and ADLs as indicated by Functional Deficits. []? Progressing: []? Met: [x]? Not Met: []? Adjusted     Long Term Goals: To be achieved in: 8 weeks  1. Disability index score of 10% or less for the LEFS to assist with reaching prior level of function. []? Progressing: []? Met: [x]? Not Met: []? Adjusted  2.  Patient will demonstrate increased AROM to full L knee to allow for proper joint functioning as indicated by patients Functional Deficits. []? Progressing: []? Met: [x]? Not Met: []? Adjusted  3. Patient will demonstrate an increase in Strength to good proximal hip strength and control, within 5lb HHD in LE to allow for proper functional mobility as indicated by patients Functional Deficits. []? Progressing: []? Met: [x]? Not Met: []? Adjusted  4. Patient will return to daily functional activities without increased symptoms or restriction. []? Progressing: []? Met: [x]? Not Met: []? Adjusted  5. Pt will be able to ambulate without crutch and with full knee extension and quad recruitment without increased symptoms or restrictions. []? Progressing: []? Met: [x]? Not Met: []? Adjusted      ASSESSMENT:  Improved tolerance to treatment today. Ambulating w/ less antalgic gait pattern, still lacking significant active TKE. DN performed to medial and lateral hamstring today w/ improved PROM 6-110 after manual. Likely to benefit from DN in graft sight after 12 weeks postop to not interfere with healing of ligament repair. Patellar mobility slowly improving but still restricted, postero lateral tibial mobility more restricted than medial. Discussed again importance of low load long duration positioning to try to improve mobility restrictions, especially since patient reports he did not have full ROM before surgery. Will be increasing PT frequency to 3x/week to try to restore motion.     Return to Play: (if applicable)   [x]  Stage 1: Intro to Strength   []  Stage 2: Return to Run and Strength   []  Stage 3: Return to Jump and Strength   []  Stage 4: Dynamic Strength and Agility   []  Stage 5: Sport Specific Training     []  Ready to Return to Play, Meets All Above Stages   []  Not Ready for Return to Sports   Comments:            Treatment/Activity Tolerance:  [x] Patient tolerated treatment well [] Patient limited by fatique  [] Patient limited by pain  [] Patient limited by other medical complications  [] Other:     Overall Progression Towards Functional goals/ Treatment Progress Update:  [] Patient is progressing as expected towards functional goals listed. [x] Progression is slowed due to complexities/Impairments listed. [] Progression has been slowed due to co-morbidities. [] Plan just implemented, too soon to assess goals progression <30days   [] Goals require adjustment due to lack of progress  [] Patient is not progressing as expected and requires additional follow up with physician  [] Other    Prognosis for POC: [x] Good [] Fair  [] Poor    Patient requires continued skilled intervention: [x] Yes  [] No        PLAN: Increase frequency 3x/week w/ focus on manual therapy for restoring ROM. [x] Continue per plan of care [] Alter current plan (see comments)  [] Plan of care initiated [] Hold pending MD visit [] Discharge    Electronically signed by: Rose Cameron PT    Note: If patient does not return for scheduled/recommended follow up visits, this note will serve as a discharge from care along with the most recent update on progress.

## 2022-02-16 ENCOUNTER — HOSPITAL ENCOUNTER (OUTPATIENT)
Dept: PHYSICAL THERAPY | Age: 22
Setting detail: THERAPIES SERIES
Discharge: HOME | End: 2022-02-16
Payer: COMMERCIAL

## 2022-02-16 PROCEDURE — 97110 THERAPEUTIC EXERCISES: CPT

## 2022-02-16 PROCEDURE — 97140 MANUAL THERAPY 1/> REGIONS: CPT

## 2022-02-16 NOTE — FLOWSHEET NOTE
[place] [address] [address] [facility]  Phone: [phone] Fax: [phone]    Physical Therapy Treatment Note/ Progress Report:     Date:  2022    Patient Name:  Rd Cuello    :  2000  MRN: [id number]  Restrictions/Precautions:    Medical/Treatment Diagnosis Information:  · Diagnosis: Z48.89 encounter for other specified surgical aftercare  · Treatment Diagnosis: M25.562, M25.662, M62.559, R53.1, K69.5  Insurance/Certification information:  PT Insurance Information: Meghana Perez; DED NOT MET; DN NOT COVERED; 80/20%; 60 VISITS/YEAR, 7 USED  Physician Information:  Referring Practitioner: Theodore Mathews  Plan of care signed (Y/N):     Date of Patient follow up with Physician:      Progress Report: []  Yes  [x]  No     Date Range for reporting period:  Beginnin2022  Ending:      Progress report due (10 Rx/or 30 days whichever is less): 3/69/9563     Recertification due (POC duration/ or 90 days whichever is less): 2022     Visit # Insurance Allowable Auth Needed   6 60 ( 7 used) []Yes   []No     Latex Allergy:  [x]NO      []YES  Preferred Language for Healthcare:   [x]English       []other:  Functional Scale: LEFS: 33/80  Date assessed: 2022    Pain level:  0-4/10     SUBJECTIVE:  Has another follow up in 4 weeks at which point they will determine if he needs to go back in for a scope/manip. Admits his L knee has been very achy just generally throughout the knee.      OBJECTIVE:    Observation: ambulating w/ significant limitation in TKE at heel strike   Test measurements:      :  beginning of session  :   beginning of session --> 7-115 end of session      RESTRICTIONS/PRECAUTIONS: s/p L ACLr w/ patellar tendon graft,  DOS: 2021    Exercises/Interventions:     Therapeutic Ex (80569)  Sets/sec Reps Notes/CUES   Retro Stepper/BIKE 8'     Alter G      BFR      Sportcord March      3 way SLR      LAQ 20 deg ext lag   Clam ABD      Hip Ext Maurizio Hails      BOSU fwd/side lunge    BOSU squat    Leg Press Iso/Con/Ecc 0- iso 30-45 deg   Cybex HS curl    TKE 3 pl   Glute side walks      RDL      Slide Lunge      Slide HS eccentrics      Step ups/ecc step down      Prone leg hang  3'    Incline calf stretch 30'' 5    Seated hamstring stretch 30'' 3    Quad set 10'' 10    Seated heel slides 10'' 10                Manual Intervention (65672)      Knee mobs/PROM 5'  End range flex/ext   Tib/Fem Mobs 8'  Posteromedial, posterolateral   Patella Mobs 8'  Restricted superiorly   STM 8'  Posterior chain in prone leg hang; patellar tendon         NMR re-education (70391)   CUES NEEDED   [place]/Biofeedback 10/10   Quad set; 2 channel co contract   BFR      G. Med activation      Hip Ext full ROM/ G. Activation      Bosu Bal and Prop- G Med      Single leg stance/Balance/Prop      Bosu Retro G. Med act      Prone Hip froggers- sliders/elevated            Therapeutic Activity (26585)      [employer]      Dynamic Balance                  HELD 2/16 Good progress in range. Therapeutic Exercise and NMR EXR  [x] (71960) Provided verbal/tactile cueing for activities related to strengthening, flexibility, endurance, ROM for improvements in LE, proximal hip, and core control with self care, mobility, lifting, ambulation. [x] (29145) Provided verbal/tactile cueing for activities related to improving balance, coordination, kinesthetic sense, posture, motor skill, proprioception  to assist with LE, proximal hip, and core control in self care, mobility, lifting, ambulation and eccentric single leg control. NMR and Therapeutic Activities:    [x] (41045 or 65411) Provided verbal/tactile cueing for activities related to improving balance, coordination, kinesthetic sense, posture, motor skill, proprioception and motor activation to allow for proper function of core, proximal hip and LE with self care and ADLs and functional mobility. [] (44776) Gait Re-education- Provided training and instruction to the patient for proper LE, core and proximal hip recruitment and positioning and eccentric body weight control with ambulation re-education including up and down stairs     Home Exercise Program:    [x] (86914) Reviewed/Progressed HEP activities related to strengthening, flexibility, endurance, ROM of core, proximal hip and LE for functional self-care, mobility, lifting and ambulation/stair navigation   [] (57817)Reviewed/Progressed HEP activities related to improving balance, coordination, kinesthetic sense, posture, motor skill, proprioception of core, proximal hip and LE for self care, mobility, lifting, and ambulation/stair navigation      Manual Treatments:  PROM / STM / Oscillations-Mobs:  G-I, II, III, IV (PA's, Inf., Post.)  [x] (70701) Provided manual therapy to mobilize LE, proximal hip and/or LS spine soft tissue/joints for the purpose of modulating pain, promoting relaxation,  increasing ROM, reducing/eliminating soft tissue swelling/inflammation/restriction, improving soft tissue extensibility and allowing for proper ROM for normal function with self care, mobility, lifting and ambulation.             Modalities:     [x] GAME READY (VASO)- for significant edema, swelling, pain control. Charges:  Timed Code Treatment Minutes: 50   Total Treatment Minutes: 50      [] EVAL (LOW) 13062 (typically 20 minutes face-to-face)  [] EVAL (MOD) 69780 (typically 30 minutes face-to-face)  [] EVAL (HIGH) 17570 (typically 45 minutes face-to-face)  [] RE-EVAL     [x] FT(74576) x 1    [] DRY NEEDLE 1 OR 2 MUSCLES  [] NMR (48722) x     [] DRY NEEDLE 3+ MUSCLES  [x] Manual (37334) x  2     [] TA (18372) x     [] Mech Traction (00399)  [] ES(attended) (12065)     [] ES (un) (51577):   [] VASO (97737)  [] Other:      GOALS:  Patient stated goal: \"get my leg straight and be able to walk without crutch\"  []? Progressing: []? Met: [x]?  Not Met: []? Stage 3: Return to Jump and Strength   []  Stage 4: Dynamic Strength and Agility   []  Stage 5: Sport Specific Training     []  Ready to Return to Play, Meets All Above Stages   []  Not Ready for Return to Sports   Comments:            Treatment/Activity Tolerance:  [x] Patient tolerated treatment well [] Patient limited by fatique  [] Patient limited by pain  [] Patient limited by other medical complications  [] Other:     Overall Progression Towards Functional goals/ Treatment Progress Update:  [] Patient is progressing as expected towards functional goals listed. [x] Progression is slowed due to complexities/Impairments listed. [] Progression has been slowed due to co-morbidities. [] Plan just implemented, too soon to assess goals progression <30days   [] Goals require adjustment due to lack of progress  [] Patient is not progressing as expected and requires additional follow up with physician  [] Other    Prognosis for POC: [x] Good [] Fair  [] Poor    Patient requires continued skilled intervention: [x] Yes  [] No        PLAN: Increase frequency 3x/week w/ focus on manual therapy for restoring ROM. [x] Continue per plan of care [] Alter current plan (see comments)  [] Plan of care initiated [] Hold pending MD visit [] Discharge    Electronically signed by: Angelina Almonte, PT, DPT, MS, SCS    Note: If patient does not return for scheduled/recommended follow up visits, this note will serve as a discharge from care along with the most recent update on progress.

## 2022-02-18 ENCOUNTER — HOSPITAL ENCOUNTER (OUTPATIENT)
Dept: PHYSICAL THERAPY | Age: 22
Setting detail: THERAPIES SERIES
Discharge: HOME | End: 2022-02-18
Payer: COMMERCIAL

## 2022-02-18 PROCEDURE — 97140 MANUAL THERAPY 1/> REGIONS: CPT

## 2022-02-18 PROCEDURE — 97110 THERAPEUTIC EXERCISES: CPT

## 2022-02-18 NOTE — FLOWSHEET NOTE
[facility], [place]    Physical Therapy  Cancellation/No-show Note  Patient Name:  Emilia Klein  :  2000   Date:  2022  Cancelled visits to date: 0  No-shows to date: 1    For today's appointment patient:  []  Cancelled  []  Rescheduled appointment  [x]  No-show     Reason given by patient:  []  Patient ill  []  Conflicting appointment  []  No transportation    []  Conflict with work  [x]  No reason given  []  Other:     Comments:      Phone call information:   []  Phone call made today to patient at _ time at number provided:      []  Patient answered, conversation as follows:    []  Patient did not answer, message left as follows:  [x]  Phone call not made today  []  Phone call not needed - pt contacted us to cancel and provided reason for cancellation.      Electronically signed by:  Stefany Moss, PT, DPT, MS, SCS

## 2022-02-18 NOTE — FLOWSHEET NOTE
[place] [address] [facility]  Phone: [phone] Fax: [phone]    Physical Therapy Treatment Note/ Progress Report:     Date:  2022    Patient Name:  Say Garcia    :  2000  MRN: [id number]  Restrictions/Precautions:    Medical/Treatment Diagnosis Information:  · Diagnosis: Z48.89 encounter for other specified surgical aftercare  · Treatment Diagnosis: M25.562, M25.662, M62.559, R53.1, I15.6  Insurance/Certification information:  PT Insurance Information: [facility]; DED NOT MET; DN NOT COVERED; 80/20%; 60 VISITS/YEAR, 7 USED  Physician Information:  Referring Practitioner: Bong Bonds  Plan of care signed (Y/N):     Date of Patient follow up with Physician:      Progress Report: []  Yes  [x]  No     Date Range for reporting period:  Beginnin2022  Ending:      Progress report due (10 Rx/or 30 days whichever is less):      Recertification due (POC duration/ or 90 days whichever is less): 2022     Visit # Insurance Allowable Auth Needed   7 60 ( 7 used) []Yes   []No     Latex Allergy:  [x]NO      []YES  Preferred Language for Healthcare:   [x]English       []other:  Functional Scale: LEFS:   Date assessed: 2022    Pain level:  0-4/10     SUBJECTIVE:  Has another follow up in 4 weeks at which point they will determine if he needs to go back in for a scope/manip. Admits his L knee has been very achy just generally throughout the knee.      OBJECTIVE:    Observation: ambulating w/ significant limitation in TKE at heel strike   Test measurements:      :  beginning of session  216:   beginning of session --> 7-115 end of session  2:   beginning of session -->4-115 end of session      RESTRICTIONS/PRECAUTIONS: s/p L ACLr w/ patellar tendon graft,  DOS: 2021    Exercises/Interventions:     Therapeutic Ex (36351)  Sets/sec Reps Notes/CUES   Retro Stepper/BIKE 8' Alter G      BFR      Sportcord March      3 way SLR      LAQ 20 deg ext lag   Clam ABD      Hip Ext [place] Lamer      BOSU fwd/side lunge    BOSU squat    Leg Press Iso/Con/Ecc 0- iso 30-45 deg   Cybex HS curl    TKE 3 pl   Glute side walks      RDL      Slide Lunge      Slide HS eccentrics      Step ups/ecc step down      Prone leg hang  3'    Incline calf stretch 30'' 5    Seated hamstring stretch 30'' 3    Quad set 10'' 10    Seated heel slides 10'' 10    Prone TKEs 10s 10    CC TKE 5s 2x10 2 pl         Manual Intervention (33594)      Knee mobs/PROM 5'  End range flex/ext   Tib/Fem Mobs 8'  Posteromedial, posterolateral   Patella Mobs 8'  Restricted superiorly   STM 8'  Posterior chain in prone leg hang; patellar tendon         NMR re-education (70787)   CUES NEEDED   [place]/Biofeedback 10/10   Quad set; 2 channel co contract   BFR      G. Med activation      Hip Ext full ROM/ G. Activation      Bosu Bal and Prop- G Med      Single leg stance/Balance/Prop      Bosu Retro G. Med act      Prone Hip froggers- sliders/elevated            Therapeutic Activity (64151)      [email or web address]      Dynamic Balance                  HELD 2/16 Good progress in range. Therapeutic Exercise and NMR EXR  [x] (83572) Provided verbal/tactile cueing for activities related to strengthening, flexibility, endurance, ROM for improvements in LE, proximal hip, and core control with self care, mobility, lifting, ambulation. [x] (95009) Provided verbal/tactile cueing for activities related to improving balance, coordination, kinesthetic sense, posture, motor skill, proprioception  to assist with LE, proximal hip, and core control in self care, mobility, lifting, ambulation and eccentric single leg control.      NMR and Therapeutic Activities:    [x] (42937 or 29706) Provided verbal/tactile cueing for activities related to improving balance, coordination, kinesthetic sense, posture, motor skill, proprioception and motor activation to allow for proper function of core, proximal hip and LE with self care and ADLs and functional mobility.   [] (07035) Gait Re-education- Provided training and instruction to the patient for proper LE, core and proximal hip recruitment and positioning and eccentric body weight control with ambulation re-education including up and down stairs     Home Exercise Program:    [x] (77395) Reviewed/Progressed HEP activities related to strengthening, flexibility, endurance, ROM of core, proximal hip and LE for functional self-care, mobility, lifting and ambulation/stair navigation   [] (21430)Reviewed/Progressed HEP activities related to improving balance, coordination, kinesthetic sense, posture, motor skill, proprioception of core, proximal hip and LE for self care, mobility, lifting, and ambulation/stair navigation      Manual Treatments:  PROM / STM / Oscillations-Mobs:  G-I, II, III, IV (PA's, Inf., Post.)  [x] (59781) Provided manual therapy to mobilize LE, proximal hip and/or LS spine soft tissue/joints for the purpose of modulating pain, promoting relaxation,  increasing ROM, reducing/eliminating soft tissue swelling/inflammation/restriction, improving soft tissue extensibility and allowing for proper ROM for normal function with self care, mobility, lifting and ambulation.             Modalities:     [x] GAME READY (VASO)- for significant edema, swelling, pain control.      Charges:  Timed Code Treatment Minutes: 50   Total Treatment Minutes: 50      [] EVAL (LOW) 47967 (typically 20 minutes face-to-face)  [] EVAL (MOD) 69966 (typically 30 minutes face-to-face)  [] EVAL (HIGH) 42452 (typically 45 minutes face-to-face)  [] RE-EVAL     [x] EH(48986) x 1    [] DRY NEEDLE 1 OR 2 MUSCLES  [] NMR (42907) x     [] DRY NEEDLE 3+ MUSCLES  [x] Manual (99574) x  2     [] TA (79697) x     [] Mech Traction (92223)  [] ES(attended) (97896)     [] ES (un) (40135):   [] VASO (97205)  [] Other:      GOALS:  Patient stated goal: \"get my leg straight and be able to walk without crutch\"  []? Progressing: []? Met: [x]? Not Met: []? Adjusted     Therapist goals for Patient:   Short Term Goals: To be achieved in: 2 weeks  1. Independent in HEP and progression per patient tolerance, in order to prevent re-injury. []? Progressing: []? Met: [x]? Not Met: []? Adjusted  2. Patient will have a decrease in pain to facilitate improvement in movement, function, and ADLs as indicated by Functional Deficits. []? Progressing: []? Met: [x]? Not Met: []? Adjusted     Long Term Goals: To be achieved in: 8 weeks  1. Disability index score of 10% or less for the LEFS to assist with reaching prior level of function. []? Progressing: []? Met: [x]? Not Met: []? Adjusted  2. Patient will demonstrate increased AROM to full L knee to allow for proper joint functioning as indicated by patients Functional Deficits. []? Progressing: []? Met: [x]? Not Met: []? Adjusted  3. Patient will demonstrate an increase in Strength to good proximal hip strength and control, within 5lb HHD in LE to allow for proper functional mobility as indicated by patients Functional Deficits. []? Progressing: []? Met: [x]? Not Met: []? Adjusted  4. Patient will return to daily functional activities without increased symptoms or restriction. []? Progressing: []? Met: [x]? Not Met: []? Adjusted  5. Pt will be able to ambulate without crutch and with full knee extension and quad recruitment without increased symptoms or restrictions. []? Progressing: []? Met: [x]? Not Met: []? Adjusted      ASSESSMENT:  Deferred DN today due to good maintenance of ROM since last session. Likely to benefit from continued DN in graft sight as needed after 12 weeks postop to not interfere with healing of ligament repair but still progress and restore L knee ROM. Continued focus of session on aggressive manual techniques and self stretching to restore L knee ROM followed by quad activation/strengthening tasks. Did initiate prone TKEs and cable column TKEs after biofeedback with [place] stim today to try to further improve patient's volitional quad muscle fiber activation. ROM 4-115 at end of session. Return to Play: (if applicable)   [x]  Stage 1: Intro to Strength   []  Stage 2: Return to Run and Strength   []  Stage 3: Return to Jump and Strength   []  Stage 4: Dynamic Strength and Agility   []  Stage 5: Sport Specific Training     []  Ready to Return to Play, Meets All Above Stages   []  Not Ready for Return to Sports   Comments:            Treatment/Activity Tolerance:  [x] Patient tolerated treatment well [] Patient limited by fatique  [] Patient limited by pain  [] Patient limited by other medical complications  [] Other:     Overall Progression Towards Functional goals/ Treatment Progress Update:  [] Patient is progressing as expected towards functional goals listed. [x] Progression is slowed due to complexities/Impairments listed. [] Progression has been slowed due to co-morbidities. [] Plan just implemented, too soon to assess goals progression <30days   [] Goals require adjustment due to lack of progress  [] Patient is not progressing as expected and requires additional follow up with physician  [] Other    Prognosis for POC: [x] Good [] Fair  [] Poor    Patient requires continued skilled intervention: [x] Yes  [] No        PLAN: Increase frequency 3x/week w/ focus on manual therapy for restoring ROM. [x] Continue per plan of care [] Alter current plan (see comments)  [] Plan of care initiated [] Hold pending MD visit [] Discharge    Electronically signed by: Gladys Win, PT, DPT, MS, SCS    Note: If patient does not return for scheduled/recommended follow up visits, this note will serve as a discharge from care along with the most recent update on progress.

## 2022-02-21 ENCOUNTER — HOSPITAL ENCOUNTER (OUTPATIENT)
Dept: PHYSICAL THERAPY | Age: 22
Setting detail: THERAPIES SERIES
Discharge: HOME | End: 2022-02-21
Payer: COMMERCIAL

## 2022-02-21 PROCEDURE — 97110 THERAPEUTIC EXERCISES: CPT

## 2022-02-21 PROCEDURE — 97140 MANUAL THERAPY 1/> REGIONS: CPT

## 2022-02-21 NOTE — FLOWSHEET NOTE
[address] [place] [address] [place] [address]  Phone: [phone] Fax: [phone]    Physical Therapy Treatment Note/ Progress Report:     Date:  2022    Patient Name:  Thiago Aranda    :  2000  MRN: [id number]  Restrictions/Precautions:    Medical/Treatment Diagnosis Information:  · Diagnosis: Z48.89 encounter for other specified surgical aftercare  · Treatment Diagnosis: M25.562, M25.662, M62.559, R53.1, M07.5  Insurance/Certification information:  PT Insurance Information: [employer]; DED NOT MET; DN NOT COVERED; 80/20%; 60 VISITS/YEAR, 7 USED  Physician Information:  Referring Practitioner: Mauricio Balderas  Plan of care signed (Y/N):     Date of Patient follow up with Physician:      Progress Report: []  Yes  [x]  No     Date Range for reporting period:  Beginnin2022  Ending:      Progress report due (10 Rx/or 30 days whichever is less):      Recertification due (POC duration/ or 90 days whichever is less): 2022     Visit # Insurance Allowable Auth Needed   8 60 ( 7 used) []Yes   []No     Latex Allergy:  [x]NO      []YES  Preferred Language for Healthcare:   [x]English       []other:  Functional Scale: LEFS: 33/80  Date assessed: 2022    Pain level:  0-4/10     SUBJECTIVE:  Has another follow up in 4 weeks at which point they will determine if he needs to go back in for a scope/manip. Admits his L knee has been very achy just generally throughout the knee.      OBJECTIVE:    Observation: ambulating w/ significant limitation in TKE at heel strike   Test measurements:      :  beginning of session  2:   beginning of session --> 7-115 end of session  :   beginning of session -->4-115 end of session  2:   beginning of session --> 5-115 end of session       RESTRICTIONS/PRECAUTIONS: s/p L ACLr w/ patellar tendon graft,  DOS: 2021    Exercises/Interventions:     Therapeutic Ex (79809)  Sets/sec Reps Notes/CUES   Retro Stepper/BIKE 8'     Alter G      BFR      Sportcord March      3 way SLR      LAQ 20 deg ext lag   Clam ABD      Hip Ext Josias Blank      BOSU fwd/side lunge    BOSU squat    Leg Press Iso/Con/Ecc 0- iso 30-45 deg   Cybex HS curl    TKE 3 pl   Glute side walks      RDL      Slide Lunge      Slide HS eccentrics      Step ups/ecc step down      Prone leg hang - 2# ankle weight  3'    Incline calf stretch 30'' 5    Seated hamstring stretch 30'' 3    Quad set 10'' 10    Seated heel slides 10'' 10    Prone TKEs 10s 10    CC TKE 5s 2x10 2 pl         Manual Intervention (33284)      Knee mobs/PROM 5'  End range flex/ext   Tib/Fem Mobs 8'  Posteromedial, posterolateral   Patella Mobs 8'  Restricted superiorly   STM 8'  Posterior chain in prone leg hang; patellar tendon         NMR re-education (63566)   CUES NEEDED   [place]/Biofeedback 10/10   Quad set; 2 channel co contract   BFR      G. Med activation      Hip Ext full ROM/ G. Activation      Bosu Bal and Prop- G Med      Single leg stance/Balance/Prop      Bosu Retro G. Med act      Prone Hip froggers- sliders/elevated            Therapeutic Activity (92909)      [place]      Dynamic Balance                  HELD 2/21 Good progress in range. Therapeutic Exercise and NMR EXR  [x] (90761) Provided verbal/tactile cueing for activities related to strengthening, flexibility, endurance, ROM for improvements in LE, proximal hip, and core control with self care, mobility, lifting, ambulation. [x] (91235) Provided verbal/tactile cueing for activities related to improving balance, coordination, kinesthetic sense, posture, motor skill, proprioception  to assist with LE, proximal hip, and core control in self care, mobility, lifting, ambulation and eccentric single leg control.      NMR and Therapeutic Activities:    [x] (77925 or 52513) Provided verbal/tactile cueing for activities related to improving balance, coordination, kinesthetic sense, posture, motor skill, proprioception and motor activation to allow for proper function of core, proximal hip and LE with self care and ADLs and functional mobility.   [] (92757) Gait Re-education- Provided training and instruction to the patient for proper LE, core and proximal hip recruitment and positioning and eccentric body weight control with ambulation re-education including up and down stairs     Home Exercise Program:    [x] (24426) Reviewed/Progressed HEP activities related to strengthening, flexibility, endurance, ROM of core, proximal hip and LE for functional self-care, mobility, lifting and ambulation/stair navigation   [] (55545)Reviewed/Progressed HEP activities related to improving balance, coordination, kinesthetic sense, posture, motor skill, proprioception of core, proximal hip and LE for self care, mobility, lifting, and ambulation/stair navigation      Manual Treatments:  PROM / STM / Oscillations-Mobs:  G-I, II, III, IV (PA's, Inf., Post.)  [x] (48609) Provided manual therapy to mobilize LE, proximal hip and/or LS spine soft tissue/joints for the purpose of modulating pain, promoting relaxation,  increasing ROM, reducing/eliminating soft tissue swelling/inflammation/restriction, improving soft tissue extensibility and allowing for proper ROM for normal function with self care, mobility, lifting and ambulation.             Modalities:     [x] GAME READY (VASO)- for significant edema, swelling, pain control.      Charges:  Timed Code Treatment Minutes: 50   Total Treatment Minutes: 50      [] EVAL (LOW) 37873 (typically 20 minutes face-to-face)  [] EVAL (MOD) 63014 (typically 30 minutes face-to-face)  [] EVAL (HIGH) 96371 (typically 45 minutes face-to-face)  [] RE-EVAL     [x] [place](62689) x 1    [] DRY NEEDLE 1 OR 2 MUSCLES  [] NMR (88296) x     [] DRY NEEDLE 3+ MUSCLES  [x] Manual (55986) x  2     [] TA (25087) x     [] Mech Traction (93260)  [] ES(attended) (21073)     [] ES (un) (22621):   [] VASO (43570)  [] Other:      GOALS:  Patient stated goal: \"get my leg straight and be able to walk without crutch\"  []? Progressing: []? Met: [x]? Not Met: []? Adjusted     Therapist goals for Patient:   Short Term Goals: To be achieved in: 2 weeks  1. Independent in HEP and progression per patient tolerance, in order to prevent re-injury. []? Progressing: []? Met: [x]? Not Met: []? Adjusted  2. Patient will have a decrease in pain to facilitate improvement in movement, function, and ADLs as indicated by Functional Deficits. []? Progressing: []? Met: [x]? Not Met: []? Adjusted     Long Term Goals: To be achieved in: 8 weeks  1. Disability index score of 10% or less for the LEFS to assist with reaching prior level of function. []? Progressing: []? Met: [x]? Not Met: []? Adjusted  2. Patient will demonstrate increased AROM to full L knee to allow for proper joint functioning as indicated by patients Functional Deficits. []? Progressing: []? Met: [x]? Not Met: []? Adjusted  3. Patient will demonstrate an increase in Strength to good proximal hip strength and control, within 5lb HHD in LE to allow for proper functional mobility as indicated by patients Functional Deficits. []? Progressing: []? Met: [x]? Not Met: []? Adjusted  4. Patient will return to daily functional activities without increased symptoms or restriction. []? Progressing: []? Met: [x]? Not Met: []? Adjusted  5. Pt will be able to ambulate without crutch and with full knee extension and quad recruitment without increased symptoms or restrictions. []? Progressing: []? Met: [x]? Not Met: []? Adjusted      ASSESSMENT:  L knee range definitely tighter, more restricted today compared to last visit especially with extension. Patient states that he did a lot of walking this weekend and thinks this may be why his knee is tighter.  Continued focus of session on aggressive manual techniques and self stretching to restore L knee ROM followed by quad activation/strengthening tasks. ROM 5-115 at end of session. Return to Play: (if applicable)   [x]  Stage 1: Intro to Strength   []  Stage 2: Return to Run and Strength   []  Stage 3: Return to Jump and Strength   []  Stage 4: Dynamic Strength and Agility   []  Stage 5: Sport Specific Training     []  Ready to Return to Play, Meets All Above Stages   []  Not Ready for Return to Sports   Comments:            Treatment/Activity Tolerance:  [x] Patient tolerated treatment well [] Patient limited by fatique  [] Patient limited by pain  [] Patient limited by other medical complications  [] Other:     Overall Progression Towards Functional goals/ Treatment Progress Update:  [] Patient is progressing as expected towards functional goals listed. [x] Progression is slowed due to complexities/Impairments listed. [] Progression has been slowed due to co-morbidities. [] Plan just implemented, too soon to assess goals progression <30days   [] Goals require adjustment due to lack of progress  [] Patient is not progressing as expected and requires additional follow up with physician  [] Other    Prognosis for POC: [x] Good [] Fair  [] Poor    Patient requires continued skilled intervention: [x] Yes  [] No        PLAN: Increase frequency 3x/week w/ focus on manual therapy for restoring ROM. [x] Continue per plan of care [] Alter current plan (see comments)  [] Plan of care initiated [] Hold pending MD visit [] Discharge    Electronically signed by: Bryan Roblero, PT, DPT, MS, SCS    Note: If patient does not return for scheduled/recommended follow up visits, this note will serve as a discharge from care along with the most recent update on progress.

## 2022-02-23 ENCOUNTER — HOSPITAL ENCOUNTER (OUTPATIENT)
Dept: PHYSICAL THERAPY | Age: 22
Setting detail: THERAPIES SERIES
Discharge: HOME | End: 2022-02-23
Payer: COMMERCIAL

## 2022-02-23 PROCEDURE — 97110 THERAPEUTIC EXERCISES: CPT

## 2022-02-23 PROCEDURE — 97140 MANUAL THERAPY 1/> REGIONS: CPT

## 2022-02-23 PROCEDURE — 20560 NDL INSJ W/O NJX 1 OR 2 MUSC: CPT

## 2022-02-23 PROCEDURE — 97032 APPL MODALITY 1+ESTIM EA 15: CPT

## 2022-02-23 NOTE — FLOWSHEET NOTE
[place] [address] [facility]  Phone: [phone] Fax: [phone]    Physical Therapy Treatment Note/ Progress Report:     Date:  2022    Patient Name:  Gabe Hawkins    :  2000  MRN: [id number]  Restrictions/Precautions:    Medical/Treatment Diagnosis Information:  · Diagnosis: Z48.89 encounter for other specified surgical aftercare  · Treatment Diagnosis: M25.562, M25.662, M62.559, R53.1, L93.8  Insurance/Certification information:  PT Insurance Information: Lianet Oneill; DED NOT MET; DN NOT COVERED; 80/20%; 60 VISITS/YEAR, 7 USED  Physician Information:  Referring Practitioner: Mora Hooks  Plan of care signed (Y/N):     Date of Patient follow up with Physician:      Progress Report: []  Yes  [x]  No     Date Range for reporting period:  Beginnin2022  Ending:      Progress report due (10 Rx/or 30 days whichever is less):      Recertification due (POC duration/ or 90 days whichever is less): 2022     Visit # Insurance Allowable Auth Needed   9 60 ( 7 used) []Yes   []No     Latex Allergy:  [x]NO      []YES  Preferred Language for Healthcare:   [x]English       []other:  Functional Scale: LEFS: 33/80  Date assessed: 2022    Pain level:  0-4/10     SUBJECTIVE:  Has another follow up in 4 weeks at which point they will determine if he needs to go back in for a scope/manip. Admits his L knee has been very achy just generally throughout the knee.      OBJECTIVE:    Observation: ambulating w/ significant limitation in TKE at heel strike   Test measurements:      :  beginning of session  2:   beginning of session --> 7-115 end of session  :   beginning of session -->4-115 end of session  :   beginning of session --> 5-115 end of session   2:   beginning of session --> 2-118 end of session      RESTRICTIONS/PRECAUTIONS: s/p L ACLr w/ patellar tendon graft,  DOS: 12/17/2021    Exercises/Interventions:     Therapeutic Ex (53468)  Sets/sec Reps Notes/CUES   Retro Stepper/BIKE 8'     Alter G      BFR      Sportcord March      3 way SLR      LAQ 20 deg ext lag   Clam ABD      Hip Ext /table      BOSU fwd/side lunge    BOSU squat    Leg Press Iso/Con/Ecc 0- iso 30-45 deg   Cybex HS curl    TKE 3 pl   Glute side walks      RDL      Slide Lunge      Slide HS eccentrics      Step ups/ecc step down      Prone leg hang - 2# ankle weight  3 min    Incline calf stretch 30'' 5    Seated hamstring stretch 30'' 3    Quad set 10'' 10    Seated heel slides 10'' 10    Prone TKEs 10s 10    CC TKE 5s 2x10 3 pl         Manual Intervention (51099)      Knee mobs/PROM  5 min End range flex/ext   Tib/Fem Mobs  8 min Posteromedial, posterolateral   Patella Mobs  8 min Restricted superiorly   STM  8 min Posterior chain in prone leg hang; patellar tendon         NMR re-education (71997)   CUES NEEDED   Norwegian/Biofeedback 10/10  5 min Quad set; 2 channel co contract   BFR      G. Med activation      Hip Ext full ROM/ G. Activation      Bosu Bal and Prop- G Med      Single leg stance/Balance/Prop      Bosu Retro G. Med act      Prone Hip froggers- sliders/elevated            Therapeutic Activity (26427)      Ladders      [place]      Dynamic Balance                  DN + estim  10 min L distal VL at insertion on patella         Therapeutic Exercise and NMR EXR  [x] (43290) Provided verbal/tactile cueing for activities related to strengthening, flexibility, endurance, ROM for improvements in LE, proximal hip, and core control with self care, mobility, lifting, ambulation. [x] (19492) Provided verbal/tactile cueing for activities related to improving balance, coordination, kinesthetic sense, posture, motor skill, proprioception  to assist with LE, proximal hip, and core control in self care, mobility, lifting, ambulation and eccentric single leg control.      NMR and Therapeutic Activities:    [x] (19482 or ) Provided verbal/tactile cueing for activities related to improving balance, coordination, kinesthetic sense, posture, motor skill, proprioception and motor activation to allow for proper function of core, proximal hip and LE with self care and ADLs and functional mobility.   [] (28203) Gait Re-education- Provided training and instruction to the patient for proper LE, core and proximal hip recruitment and positioning and eccentric body weight control with ambulation re-education including up and down stairs     Home Exercise Program:    [x] (75305) Reviewed/Progressed HEP activities related to strengthening, flexibility, endurance, ROM of core, proximal hip and LE for functional self-care, mobility, lifting and ambulation/stair navigation   [] (95657)Reviewed/Progressed HEP activities related to improving balance, coordination, kinesthetic sense, posture, motor skill, proprioception of core, proximal hip and LE for self care, mobility, lifting, and ambulation/stair navigation      Manual Treatments:  PROM / STM / Oscillations-Mobs:  G-I, II, III, IV (PA's, Inf., Post.)  [x] (22433) Provided manual therapy to mobilize LE, proximal hip and/or LS spine soft tissue/joints for the purpose of modulating pain, promoting relaxation,  increasing ROM, reducing/eliminating soft tissue swelling/inflammation/restriction, improving soft tissue extensibility and allowing for proper ROM for normal function with self care, mobility, lifting and ambulation.        Spoke with Dr. Andrés Turpin  regarding the use of Dry Needling      Dry needling manual therapy: consisted on the placement of 2 needles in the following muscles:  L distal VL at insertion on patella.  A 30-50 mm needle was inserted, piston, rotated, and coned to produce intramuscular mobilization.  These techniques were used to restore functional range of motion, reduce muscle spasm and induce healing in the corresponding musculature.  (50354)  Clean Technique was utilized today while applying Dry needling treatment.  The treatment sites where cleaned with 70% solution of  isopropyl alcohol .  The PT washed their hands and utilized treatment gloves along with hand  prior to inserting the needles.  All needles where removed and discarded in the appropriate sharps container. MD has given verbal and/or written approval for this treatment.      Attended low frequency (1-20Hz) electrical stimulation was utilized in conjunction with Dry Needling:  the Estim was manipulated between all above needles for a period of 10 min.  at 4-6 volts.  The low frequency electrical stimulation was used to help reduce muscle spasm and help to interrupt /Gonvick the pain cycle. (51850)      Modalities:     [x] GAME READY (VASO)- for significant edema, swelling, pain control. Charges:  Timed Code Treatment Minutes: 45   Total Treatment Minutes: 65      [] EVAL (LOW) 22936 (typically 20 minutes face-to-face)  [] EVAL (MOD) 16236 (typically 30 minutes face-to-face)  [] EVAL (HIGH) 95588 (typically 45 minutes face-to-face)  [] RE-EVAL     [x] ZS(06950) x 1    [x] DRY NEEDLE 1 OR 2 MUSCLES  [] NMR (75076) x     [] DRY NEEDLE 3+ MUSCLES  [x] Manual (89076) x  2     [] TA (80315) x     [] Mech Traction (33987)  [x] ES(attended) (68114)     [] ES (un) (91949):   [] VASO (48042)  [] Other:      GOALS:  Patient stated goal: \"get my leg straight and be able to walk without crutch\"  []? Progressing: []? Met: [x]? Not Met: []? Adjusted     Therapist goals for Patient:   Short Term Goals: To be achieved in: 2 weeks  1. Independent in HEP and progression per patient tolerance, in order to prevent re-injury. []? Progressing: []? Met: [x]? Not Met: []? Adjusted  2. Patient will have a decrease in pain to facilitate improvement in movement, function, and ADLs as indicated by Functional Deficits. []? Progressing: []? Met: [x]? Not Met: []? Adjusted     Long Term Goals: To be achieved in: 8 weeks  1.  Disability index score of 10% or less for the LEFS to assist with reaching prior level of function. []? Progressing: []? Met: [x]? Not Met: []? Adjusted  2. Patient will demonstrate increased AROM to full L knee to allow for proper joint functioning as indicated by patients Functional Deficits. []? Progressing: []? Met: [x]? Not Met: []? Adjusted  3. Patient will demonstrate an increase in Strength to good proximal hip strength and control, within 5lb HHD in LE to allow for proper functional mobility as indicated by patients Functional Deficits. []? Progressing: []? Met: [x]? Not Met: []? Adjusted  4. Patient will return to daily functional activities without increased symptoms or restriction. []? Progressing: []? Met: [x]? Not Met: []? Adjusted  5. Pt will be able to ambulate without crutch and with full knee extension and quad recruitment without increased symptoms or restrictions. []? Progressing: []? Met: [x]? Not Met: []? Adjusted      ASSESSMENT:  Continued focus of session on aggressive manual techniques and self stretching to restore L knee ROM followed by quad activation/strengthening tasks. Performed DN and e-stim to L distal VL at insertion on patella at site of significant stretch and discomfort with L knee flexion stretching likely where patient has some scarring contributing to mobility limitations. Performed frequent pistoning and coning during e-stim to help further break up scar tissue in the area. Noted improved L knee range at session conclusion.        Return to Play: (if applicable)   [x]  Stage 1: Intro to Strength   []  Stage 2: Return to Run and Strength   []  Stage 3: Return to Jump and Strength   []  Stage 4: Dynamic Strength and Agility   []  Stage 5: Sport Specific Training     []  Ready to Return to Play, Meets All Above Stages   []  Not Ready for Return to Sports   Comments:            Treatment/Activity Tolerance:  [x] Patient tolerated treatment well [] Patient limited by guicho  [] Patient limited by pain  [] Patient limited by other medical complications  [] Other:     Overall Progression Towards Functional goals/ Treatment Progress Update:  [] Patient is progressing as expected towards functional goals listed. [x] Progression is slowed due to complexities/Impairments listed. [] Progression has been slowed due to co-morbidities. [] Plan just implemented, too soon to assess goals progression <30days   [] Goals require adjustment due to lack of progress  [] Patient is not progressing as expected and requires additional follow up with physician  [] Other    Prognosis for POC: [x] Good [] Fair  [] Poor    Patient requires continued skilled intervention: [x] Yes  [] No        PLAN: Increase frequency 3x/week w/ focus on manual therapy for restoring ROM. [x] Continue per plan of care [] Alter current plan (see comments)  [] Plan of care initiated [] Hold pending MD visit [] Discharge    Electronically signed by: Donald Hassan, PT, DPT, MS, SCS    Note: If patient does not return for scheduled/recommended follow up visits, this note will serve as a discharge from care along with the most recent update on progress.

## 2022-02-25 ENCOUNTER — HOSPITAL ENCOUNTER (OUTPATIENT)
Dept: PHYSICAL THERAPY | Age: 22
Setting detail: THERAPIES SERIES
Discharge: HOME | End: 2022-02-25
Payer: COMMERCIAL

## 2022-02-25 PROCEDURE — 97110 THERAPEUTIC EXERCISES: CPT

## 2022-02-25 PROCEDURE — 97112 NEUROMUSCULAR REEDUCATION: CPT

## 2022-02-25 PROCEDURE — 97140 MANUAL THERAPY 1/> REGIONS: CPT

## 2022-02-25 NOTE — FLOWSHEET NOTE
[place] [address] [facility]  Phone: [phone] Fax: [phone]    Physical Therapy Treatment Note/ Progress Report:     Date:  2022    Patient Name:  Jesse Thompson    :  2000  MRN: [id number]  Restrictions/Precautions:    Medical/Treatment Diagnosis Information:  · Diagnosis: Z48.89 encounter for other specified surgical aftercare  · Treatment Diagnosis: M25.562, M25.662, M62.559, R53.1, I64.6  Insurance/Certification information:  PT Insurance Information: Yoselyn Mckinney; DED NOT MET; DN NOT COVERED; 80/20%; 60 VISITS/YEAR, 7 USED  Physician Information:  Referring Practitioner: Josué Motley of care signed (Y/N):     Date of Patient follow up with Physician:      Progress Report: []  Yes  [x]  No     Date Range for reporting period:  Beginnin2022  Ending:      Progress report due (10 Rx/or 30 days whichever is less): 2022 Progress note nv. Recertification due (POC duration/ or 90 days whichever is less): 2022     Visit # Insurance Allowable Auth Needed   81 38 ( 7 used) []Yes   []No     Latex Allergy:  [x]NO      []YES  Preferred Language for Healthcare:   [x]English       []other:  Functional Scale: LEFS: 33/80  Date assessed: 2022    Pain level:  0-4/10     SUBJECTIVE:  Has another follow up in 4 weeks at which point they will determine if he needs to go back in for a scope/manip. Admits his L knee has been very achy just generally throughout the knee.      OBJECTIVE:    Observation: ambulating w/ significant limitation in TKE at heel strike   Test measurements:      :  beginning of session  :   beginning of session --> 7-115 end of session  :   beginning of session -->4-115 end of session  :   beginning of session --> 5-115 end of session   :   beginning of session --> 2-118 end of session  :   beginning of session --> 2-120 end of session      RESTRICTIONS/PRECAUTIONS: s/p L ACLr w/ patellar tendon graft,  DOS: 12/17/2021    Exercises/Interventions:     Therapeutic Ex (35395)  Sets/sec Reps Notes/CUES   Retro Stepper/BIKE 8 min     Alter G      BFR      Sportcord March      3 way SLR      LAQ 20 deg ext lag   Clam ABD      Hip Ext /table      BOSU fwd/side lunge    BOSU squat    Leg Press Iso/Con/Ecc 0- iso 30-45 deg   Cybex HS curl    TKE 3 pl   Glute side walks      RDL      Slide Lunge      Slide HS eccentrics      Step ups/ecc step down      Prone leg hang - 2# ankle weight  3 min    Incline calf stretch 30'' 5    Seated hamstring stretch 30'' 3    Quad set 10'' 10    Seated heel slides    Prone TKEs 10s 10    CC TKE 5s 2x10 3 pl   Gravity-assisted knee flexion on wall 10s 10                Manual Intervention (11179)      Knee mobs/PROM  4 min End range flex/ext   Tib/Fem Mobs  6 min Posteromedial, posterolateral   Patella Mobs  6 min Restricted superiorly   STM  6 min Posterior chain in prone leg hang; patellar tendon         NMR re-education (88809)   CUES NEEDED   Tongan/Biofeedback 10/10  5 min Quad set; 2 channel co contract   Sport cord march - yellow  2 10 Fwd/bwd; each direction   BFR      G. Med activation      Hip Ext full ROM/ G. Activation      Bosu Bal and Prop- G Med      Single leg stance/Balance/Prop      Bosu Retro G. Med act      Prone Hip froggers- sliders/elevated            Therapeutic Activity (57806)      Ladders      [employer]      Dynamic Balance                  DN + estim   HELD 2/25         Therapeutic Exercise and NMR EXR  [x] (48328) Provided verbal/tactile cueing for activities related to strengthening, flexibility, endurance, ROM for improvements in LE, proximal hip, and core control with self care, mobility, lifting, ambulation.   [x] (21168) Provided verbal/tactile cueing for activities related to improving balance, coordination, kinesthetic sense, posture, motor skill, proprioception  to assist with LE, proximal hip, and core control in self care, mobility, lifting, ambulation and eccentric single leg control. NMR and Therapeutic Activities:    [x] (86971 or 04562) Provided verbal/tactile cueing for activities related to improving balance, coordination, kinesthetic sense, posture, motor skill, proprioception and motor activation to allow for proper function of core, proximal hip and LE with self care and ADLs and functional mobility.   [] (41449) Gait Re-education- Provided training and instruction to the patient for proper LE, core and proximal hip recruitment and positioning and eccentric body weight control with ambulation re-education including up and down stairs     Home Exercise Program:    [x] (07140) Reviewed/Progressed HEP activities related to strengthening, flexibility, endurance, ROM of core, proximal hip and LE for functional self-care, mobility, lifting and ambulation/stair navigation   [] (05000)Reviewed/Progressed HEP activities related to improving balance, coordination, kinesthetic sense, posture, motor skill, proprioception of core, proximal hip and LE for self care, mobility, lifting, and ambulation/stair navigation      Manual Treatments:  PROM / STM / Oscillations-Mobs:  G-I, II, III, IV (PA's, Inf., Post.)  [x] (77550) Provided manual therapy to mobilize LE, proximal hip and/or LS spine soft tissue/joints for the purpose of modulating pain, promoting relaxation,  increasing ROM, reducing/eliminating soft tissue swelling/inflammation/restriction, improving soft tissue extensibility and allowing for proper ROM for normal function with self care, mobility, lifting and ambulation.             Modalities:     [x] GAME READY (VASO)- for significant edema, swelling, pain control.      Charges:  Timed Code Treatment Minutes: 50   Total Treatment Minutes: 65      [] EVAL (LOW) 06253 (typically 20 minutes face-to-face)  [] EVAL (MOD) 24488 (typically 30 minutes face-to-face)  [] EVAL (HIGH) 22324 (typically 45 minutes face-to-face)  [] RE-EVAL     [x] TO(92848) x 1    [] DRY NEEDLE 1 OR 2 MUSCLES  [x] NMR (60067) x 1    [] DRY NEEDLE 3+ MUSCLES  [x] Manual (78792) x 1     [] TA (24794) x     [] Mech Traction (98412)  [] ES(attended) (80051)     [] ES (un) (70967):   [] VASO (55774)  [] Other:      GOALS:  Patient stated goal: \"get my leg straight and be able to walk without crutch\"  []? Progressing: []? Met: [x]? Not Met: []? Adjusted     Therapist goals for Patient:   Short Term Goals: To be achieved in: 2 weeks  1. Independent in HEP and progression per patient tolerance, in order to prevent re-injury. []? Progressing: []? Met: [x]? Not Met: []? Adjusted  2. Patient will have a decrease in pain to facilitate improvement in movement, function, and ADLs as indicated by Functional Deficits. []? Progressing: []? Met: [x]? Not Met: []? Adjusted     Long Term Goals: To be achieved in: 8 weeks  1. Disability index score of 10% or less for the LEFS to assist with reaching prior level of function. []? Progressing: []? Met: [x]? Not Met: []? Adjusted  2. Patient will demonstrate increased AROM to full L knee to allow for proper joint functioning as indicated by patients Functional Deficits. []? Progressing: []? Met: [x]? Not Met: []? Adjusted  3. Patient will demonstrate an increase in Strength to good proximal hip strength and control, within 5lb HHD in LE to allow for proper functional mobility as indicated by patients Functional Deficits. []? Progressing: []? Met: [x]? Not Met: []? Adjusted  4. Patient will return to daily functional activities without increased symptoms or restriction. []? Progressing: []? Met: [x]? Not Met: []? Adjusted  5. Pt will be able to ambulate without crutch and with full knee extension and quad recruitment without increased symptoms or restrictions. []? Progressing: []? Met: [x]? Not Met: []?  Adjusted      ASSESSMENT:  Continued focus of session on aggressive manual along with the most recent update on progress.

## 2022-02-28 ENCOUNTER — HOSPITAL ENCOUNTER (OUTPATIENT)
Dept: PHYSICAL THERAPY | Age: 22
Setting detail: THERAPIES SERIES
Discharge: HOME | End: 2022-02-28
Payer: COMMERCIAL

## 2022-02-28 PROCEDURE — 97140 MANUAL THERAPY 1/> REGIONS: CPT

## 2022-02-28 PROCEDURE — 97110 THERAPEUTIC EXERCISES: CPT

## 2022-02-28 PROCEDURE — 97112 NEUROMUSCULAR REEDUCATION: CPT

## 2022-02-28 NOTE — FLOWSHEET NOTE
[place] [address] [facility]  Phone: [phone] Fax: [phone]    Physical Therapy Treatment Note/ Progress Report:     Date:  2022    Patient Name:  Jesse Thompson    :  2000  MRN: [id number]  Restrictions/Precautions:    Medical/Treatment Diagnosis Information:  · Diagnosis: Z48.89 encounter for other specified surgical aftercare  · Treatment Diagnosis: M25.562, M25.662, M62.559, R53.1, V70.4  Insurance/Certification information:  PT Insurance Information: Qiana Natalyajodylazaro Mariano 150; DED NOT MET; DN NOT COVERED; 80/20%; 60 VISITS/YEAR, 7 USED  Physician Information:  Referring Practitioner: Josué Motley of care signed (Y/N):     Date of Patient follow up with Physician:      Progress Report: []  Yes  [x]  No     Date Range for reporting period:  Beginnin2022  Ending:      Progress report due (10 Rx/or 30 days whichever is less): 2022 Progress note nv. Recertification due (POC duration/ or 90 days whichever is less): 2022     Visit # Insurance Allowable Auth Needed   55 88 ( 7 used) []Yes   []No     Latex Allergy:  [x]NO      []YES  Preferred Language for Healthcare:   [x]English       []other:  Functional Scale: LEFS: 33/80  Date assessed: 2022    Pain level:  0-4/10     SUBJECTIVE:  L knee has been consistently achy throughout.       OBJECTIVE:    Observation: ambulating w/ significant limitation in TKE at heel strike   Test measurements:      :  beginning of session  :   beginning of session --> 7-115 end of session  :   beginning of session -->4-115 end of session  :   beginning of session --> 5-115 end of session   :   beginning of session --> 2-118 end of session  :   beginning of session --> 2-120 end of session  :  8-110 beginning of session --> 4-120  end of session      RESTRICTIONS/PRECAUTIONS: s/p L ACLr w/ patellar tendon graft, DOS: 12/17/2021    Exercises/Interventions:     Therapeutic Ex (43732)  Sets/sec Reps Notes/CUES   Retro Stepper/BIKE 8 min     Alter G      BFR      Sportcord March      3 way SLR      LAQ 20 deg ext lag   Clam ABD      Hip Ext /table      BOSU fwd/side lunge    BOSU squat    Leg Press Iso/Con/Ecc 0- iso 30-45 deg   Cybex HS curl    TKE 3 pl   Glute side walks      RDL      Slide Lunge      Slide HS eccentrics      Step ups/ecc step down      Prone leg hang - 2# ankle weight  3 min    Incline calf stretch 30'' 5    Seated hamstring stretch 30'' 3    Quad set 10'' 10    Seated heel slides    Prone TKEs 10s 10    CC TKE 5s 2x10 3 pl   Gravity-assisted knee flexion on wall 10s 10                Manual Intervention (52551)      Knee mobs/PROM  4 min End range flex/ext   Tib/Fem Mobs  6 min Posteromedial, posterolateral   Patella Mobs  6 min Restricted superiorly   STM  6 min Posterior chain in prone leg hang; patellar tendon         NMR re-education (13155)   CUES NEEDED   [place]/Biofeedback 10/10  5 min Quad set; 2 channel co contract   Sport cord march - yellow  2 10 Fwd/bwd; each direction   BFR      G. Med activation      Hip Ext full ROM/ G. Activation      Bosu Bal and Prop- G Med      Single leg stance/Balance/Prop      Bosu Retro G. Med act      Prone Hip froggers- sliders/elevated            Therapeutic Activity (02090)      Ladders      paOnde      Dynamic Balance                  DN + estim   HELD 2/28         Therapeutic Exercise and NMR EXR  [x] (19657) Provided verbal/tactile cueing for activities related to strengthening, flexibility, endurance, ROM for improvements in LE, proximal hip, and core control with self care, mobility, lifting, ambulation.   [x] (41913) Provided verbal/tactile cueing for activities related to improving balance, coordination, kinesthetic sense, posture, motor skill, proprioception  to assist with LE, proximal hip, and core control in self care, mobility, lifting, ambulation and eccentric single leg control. NMR and Therapeutic Activities:    [x] (58818 or 05506) Provided verbal/tactile cueing for activities related to improving balance, coordination, kinesthetic sense, posture, motor skill, proprioception and motor activation to allow for proper function of core, proximal hip and LE with self care and ADLs and functional mobility.   [] (60103) Gait Re-education- Provided training and instruction to the patient for proper LE, core and proximal hip recruitment and positioning and eccentric body weight control with ambulation re-education including up and down stairs     Home Exercise Program:    [x] (79981) Reviewed/Progressed HEP activities related to strengthening, flexibility, endurance, ROM of core, proximal hip and LE for functional self-care, mobility, lifting and ambulation/stair navigation   [] (08464)Reviewed/Progressed HEP activities related to improving balance, coordination, kinesthetic sense, posture, motor skill, proprioception of core, proximal hip and LE for self care, mobility, lifting, and ambulation/stair navigation      Manual Treatments:  PROM / STM / Oscillations-Mobs:  G-I, II, III, IV (PA's, Inf., Post.)  [x] (98851) Provided manual therapy to mobilize LE, proximal hip and/or LS spine soft tissue/joints for the purpose of modulating pain, promoting relaxation,  increasing ROM, reducing/eliminating soft tissue swelling/inflammation/restriction, improving soft tissue extensibility and allowing for proper ROM for normal function with self care, mobility, lifting and ambulation.             Modalities:     [x] GAME READY (VASO)- for significant edema, swelling, pain control.      Charges:  Timed Code Treatment Minutes: 50   Total Treatment Minutes: 65      [] EVAL (LOW) 51183 (typically 20 minutes face-to-face)  [] EVAL (MOD) 45978 (typically 30 minutes face-to-face)  [] EVAL (HIGH) 12068 (typically 45 minutes face-to-face)  [] RE-EVAL     [x] OA(06235) x 1    [] DRY NEEDLE 1 OR 2 MUSCLES  [x] NMR (50791) x 1    [] DRY NEEDLE 3+ MUSCLES  [x] Manual (18531) x 1     [] TA (03037) x     [] [facility] (988[facility]  [] ES(attended) (20077)     [] ES (un) (65238):   [] VASO (27059)  [] Other:      GOALS:  Patient stated goal: \"get my leg straight and be able to walk without crutch\"  []? Progressing: []? Met: [x]? Not Met: []? Adjusted     Therapist goals for Patient:   Short Term Goals: To be achieved in: 2 weeks  1. Independent in HEP and progression per patient tolerance, in order to prevent re-injury. []? Progressing: []? Met: [x]? Not Met: []? Adjusted  2. Patient will have a decrease in pain to facilitate improvement in movement, function, and ADLs as indicated by Functional Deficits. []? Progressing: []? Met: [x]? Not Met: []? Adjusted     Long Term Goals: To be achieved in: 8 weeks  1. Disability index score of 10% or less for the LEFS to assist with reaching prior level of function. []? Progressing: []? Met: [x]? Not Met: []? Adjusted  2. Patient will demonstrate increased AROM to full L knee to allow for proper joint functioning as indicated by patients Functional Deficits. []? Progressing: []? Met: [x]? Not Met: []? Adjusted  3. Patient will demonstrate an increase in Strength to good proximal hip strength and control, within 5lb HHD in LE to allow for proper functional mobility as indicated by patients Functional Deficits. []? Progressing: []? Met: [x]? Not Met: []? Adjusted  4. Patient will return to daily functional activities without increased symptoms or restriction. []? Progressing: []? Met: [x]? Not Met: []? Adjusted  5. Pt will be able to ambulate without crutch and with full knee extension and quad recruitment without increased symptoms or restrictions. []? Progressing: []? Met: [x]? Not Met: []?  Adjusted      ASSESSMENT:  Continued focus of session on aggressive manual techniques and self stretching to restore L knee ROM followed by quad activation/strengthening tasks. L knee ROM 4-120 at end of session. Return to Play: (if applicable)   [x]  Stage 1: Intro to Strength   []  Stage 2: Return to Run and Strength   []  Stage 3: Return to Jump and Strength   []  Stage 4: Dynamic Strength and Agility   []  Stage 5: Sport Specific Training     []  Ready to Return to Play, Meets All Above Stages   []  Not Ready for Return to Sports   Comments:            Treatment/Activity Tolerance:  [x] Patient tolerated treatment well [] Patient limited by fatique  [] Patient limited by pain  [] Patient limited by other medical complications  [] Other:     Overall Progression Towards Functional goals/ Treatment Progress Update:  [] Patient is progressing as expected towards functional goals listed. [x] Progression is slowed due to complexities/Impairments listed. [] Progression has been slowed due to co-morbidities. [] Plan just implemented, too soon to assess goals progression <30days   [] Goals require adjustment due to lack of progress  [] Patient is not progressing as expected and requires additional follow up with physician  [] Other    Prognosis for POC: [x] Good [] Fair  [] Poor    Patient requires continued skilled intervention: [x] Yes  [] No        PLAN: Increase frequency 3x/week w/ focus on manual therapy for restoring ROM. [x] Continue per plan of care [] Alter current plan (see comments)  [] Plan of care initiated [] Hold pending MD visit [] Discharge    Electronically signed by: Pamela Kohler, PT, DPT, MS, SCS    Note: If patient does not return for scheduled/recommended follow up visits, this note will serve as a discharge from care along with the most recent update on progress.

## 2022-03-02 ENCOUNTER — HOSPITAL ENCOUNTER (OUTPATIENT)
Dept: PHYSICAL THERAPY | Age: 22
Setting detail: THERAPIES SERIES
Discharge: HOME | End: 2022-03-02
Payer: COMMERCIAL

## 2022-03-02 PROCEDURE — 97110 THERAPEUTIC EXERCISES: CPT

## 2022-03-02 PROCEDURE — 97112 NEUROMUSCULAR REEDUCATION: CPT

## 2022-03-02 PROCEDURE — 97140 MANUAL THERAPY 1/> REGIONS: CPT

## 2022-03-02 NOTE — FLOWSHEET NOTE
[place] [address] [facility]  Phone: [phone] Fax: [phone]    Physical Therapy Treatment Note/ Progress Report:     Date:  3/2/2022    Patient Name:  Boyd Smith    :  2000  MRN: [id number]  Restrictions/Precautions:    Medical/Treatment Diagnosis Information:  · Diagnosis: Z48.89 encounter for other specified surgical aftercare  · Treatment Diagnosis: M25.562, M25.662, M62.559, R53.1, Y97.5  Insurance/Certification information:  PT Insurance Information: Ginette Srikanth; DED NOT MET; DN NOT COVERED; 80/20%; 60 VISITS/YEAR, 7 USED  Physician Information:  Referring Practitioner: Jaleel Hines of [facility] signed (Y/N):     Date of Patient follow up with Physician:      Progress Report: []  Yes  [x]  No     Date Range for reporting period:  Beginnin2022  Ending:      Progress report due (10 Rx/or 30 days whichever is less): 2022 Progress note nv. Recertification due (POC duration/ or 90 days whichever is less): 2022     Visit # Insurance Allowable Auth Needed   12 60 ( 7 used) []Yes   []No     Latex Allergy:  [x]NO      []YES  Preferred Language for Healthcare:   [x]English       []other:  Functional Scale: LEFS: 33/80  Date assessed: 2022    Pain level:  0-4/10     SUBJECTIVE:  L knee has been consistently achy throughout.       OBJECTIVE:    Observation: ambulating w/ significant limitation in TKE at heel strike   Test measurements:      :  beginning of session  :   beginning of session --> 7-115 end of session  :   beginning of session -->4-115 end of session  :   beginning of session --> 5-115 end of session   :   beginning of session --> 2-118 end of session  :   beginning of session --> 2-120 end of session  :  8-110 beginning of session --> 4-120  end of session  3/2:   beginning of session -->3-120 end of session      RESTRICTIONS/PRECAUTIONS: s/p L ACLr w/ patellar tendon graft,  DOS: 12/17/2021    Exercises/Interventions:     Therapeutic Ex (91662)  Sets/sec Reps Notes/CUES   Retro Stepper/BIKE 5 min     LAQ 20 deg ext lag   BOSU fwd/side lunge    Leg Press Iso/Con/Ecc 0- iso 30-45 deg   TKE 3 pl   Prone leg hang - 2# ankle weight  3 min    Incline calf stretch 30'' 5    Seated hamstring stretch 30'' 3    Quad set 10'' 10    Seated heel slides    Prone TKEs 10s 10    CC TKE 5s 2x10 3 pl   SLR flexion 1 10 Cues to initiate with QS   Mod. BOSU lunge isos 10s 5    Gravity-assisted knee flexion on wall 10s 10                Manual Intervention (31029)      Knee mobs/PROM  4 min End range flex/ext   Tib/Fem Mobs  6 min Posteromedial, posterolateral   Patella Mobs  6 min Restricted superiorly   STM  6 min Posterior chain in prone leg hang; patellar tendon         NMR re-education (90566)   CUES NEEDED   [place]er/Biofeedback 10/10  5 min Quad set; 2 channel co contract   Sport cord march - yellow  2 10 Fwd/bwd; each direction   BFR      G. Med activation      Hip Ext full ROM/ G. Activation      Bosu Bal and Prop- G Med      Single leg stance/Balance/Prop      Bosu Retro G. Med act      Prone Hip froggers- sliders/elevated            Therapeutic Activity (50035)      [employer]      [employer]      Dynamic Balance                  DN + estim   HELD 2/28         Therapeutic Exercise and NMR EXR  [x] (51636) Provided verbal/tactile cueing for activities related to strengthening, flexibility, endurance, ROM for improvements in LE, proximal hip, and core control with self care, mobility, lifting, ambulation. [x] (89374) Provided verbal/tactile cueing for activities related to improving balance, coordination, kinesthetic sense, posture, motor skill, proprioception  to assist with LE, proximal hip, and core control in self care, mobility, lifting, ambulation and eccentric single leg control.      NMR and Therapeutic Activities:    [x] (07525 or ) Provided verbal/tactile cueing for activities related to improving balance, coordination, kinesthetic sense, posture, motor skill, proprioception and motor activation to allow for proper function of core, proximal hip and LE with self care and ADLs and functional mobility.   [] (86082) Gait Re-education- Provided training and instruction to the patient for proper LE, core and proximal hip recruitment and positioning and eccentric body weight control with ambulation re-education including up and down stairs     Home Exercise Program:    [x] (32556) Reviewed/Progressed HEP activities related to strengthening, flexibility, endurance, ROM of core, proximal hip and LE for functional self-care, mobility, lifting and ambulation/stair navigation   [] (43744)Reviewed/Progressed HEP activities related to improving balance, coordination, kinesthetic sense, posture, motor skill, proprioception of core, proximal hip and LE for self care, mobility, lifting, and ambulation/stair navigation      Manual Treatments:  PROM / STM / Oscillations-Mobs:  G-I, II, III, IV (PA's, Inf., Post.)  [x] (26659) Provided manual therapy to mobilize LE, proximal hip and/or LS spine soft tissue/joints for the purpose of modulating pain, promoting relaxation,  increasing ROM, reducing/eliminating soft tissue swelling/inflammation/restriction, improving soft tissue extensibility and allowing for proper ROM for normal function with self care, mobility, lifting and ambulation.             Modalities:     [x] GAME READY (VASO)- for significant edema, swelling, pain control.      Charges:  Timed Code Treatment Minutes: 50   Total Treatment Minutes: 65      [] EVAL (LOW) 55258 (typically 20 minutes face-to-face)  [] EVAL (MOD) 63711 (typically 30 minutes face-to-face)  [] EVAL (HIGH) 18366 (typically 45 minutes face-to-face)  [] RE-EVAL     [x] OA(66552) x 1    [] DRY NEEDLE 1 OR 2 MUSCLES  [x] NMR (68294) x 1    [] DRY NEEDLE 3+ for follow up to discuss plan and need for manipulation/knee arthroscopy to address continued knee ROM limitations next week. Return to Play: (if applicable)   [x]  Stage 1: Intro to Strength   []  Stage 2: Return to Run and Strength   []  Stage 3: Return to Jump and Strength   []  Stage 4: Dynamic Strength and Agility   []  Stage 5: Sport Specific Training     []  Ready to Return to Play, Meets All Above Stages   []  Not Ready for Return to Sports   Comments:            Treatment/Activity Tolerance:  [x] Patient tolerated treatment well [] Patient limited by fatique  [] Patient limited by pain  [] Patient limited by other medical complications  [] Other:     Overall Progression Towards Functional goals/ Treatment Progress Update:  [] Patient is progressing as expected towards functional goals listed. [x] Progression is slowed due to complexities/Impairments listed. [] Progression has been slowed due to co-morbidities. [] Plan just implemented, too soon to assess goals progression <30days   [] Goals require adjustment due to lack of progress  [] Patient is not progressing as expected and requires additional follow up with physician  [] Other    Prognosis for POC: [x] Good [] Fair  [] Poor    Patient requires continued skilled intervention: [x] Yes  [] No        PLAN: Increase frequency 3x/week w/ focus on manual therapy for restoring ROM. [x] Continue per plan of care [] Alter current plan (see comments)  [] Plan of care initiated [] Hold pending MD visit [] Discharge    Electronically signed by: Lynne Pepper, PT, DPT, MS, SCS    Note: If patient does not return for scheduled/recommended follow up visits, this note will serve as a discharge from care along with the most recent update on progress.

## 2022-03-04 ENCOUNTER — HOSPITAL ENCOUNTER (OUTPATIENT)
Dept: PHYSICAL THERAPY | Age: 22
Setting detail: THERAPIES SERIES
Discharge: HOME | End: 2022-03-04
Payer: COMMERCIAL

## 2022-03-07 ENCOUNTER — HOSPITAL ENCOUNTER (OUTPATIENT)
Dept: PHYSICAL THERAPY | Age: 22
Setting detail: THERAPIES SERIES
Discharge: HOME | End: 2022-03-07
Payer: COMMERCIAL

## 2022-03-07 PROCEDURE — 97112 NEUROMUSCULAR REEDUCATION: CPT

## 2022-03-07 PROCEDURE — 97110 THERAPEUTIC EXERCISES: CPT

## 2022-03-07 PROCEDURE — 97140 MANUAL THERAPY 1/> REGIONS: CPT

## 2022-03-07 NOTE — FLOWSHEET NOTE
[place] [address] [facility]  Phone: [phone] Fax: [phone]    Physical Therapy Treatment Note/ Progress Report:     Date:  3/7/2022    Patient Name:  Hilaria Galvez    :  2000  MRN: [id number]  Restrictions/Precautions:    Medical/Treatment Diagnosis Information:  · Diagnosis: Z48.89 encounter for other specified surgical aftercare  · Treatment Diagnosis: M25.562, M25.662, M62.559, R53.1, W28.2  Insurance/Certification information:  PT Insurance Information: Maria Isabel Conti; DED NOT MET; DN NOT COVERED; 80/20%; 60 VISITS/YEAR, 7 USED  Physician Information:  Referring Practitioner: Chloe Reveles of care signed (Y/N):     Date of Patient follow up with Physician:      Progress Report: []  Yes  [x]  No     Date Range for reporting period:  Beginnin2022  Ending:      Progress report due (10 Rx/or 30 days whichever is less): 2022 Progress note nv. Recertification due (POC duration/ or 90 days whichever is less): 2022     Visit # Insurance Allowable Auth Needed   53 20 ( 7 used) []Yes   []No     Latex Allergy:  [x]NO      []YES  Preferred Language for Healthcare:   [x]English       []other:  Functional Scale: LEFS: 33/80  Date assessed: 2022    Pain level:  0-4/10     SUBJECTIVE:  Patient reports that his knee is stiff from missing PT on Friday. Will follow up with surgeon on Friday.      OBJECTIVE:    Observation: ambulating w/ significant limitation in TKE at heel strike   Test measurements:      :  beginning of session  :   beginning of session --> 7-115 end of session  :   beginning of session -->4-115 end of session  :   beginning of session --> 5-115 end of session   :   beginning of session --> 2-118 end of session  :   beginning of session --> 2-120 end of session  :  8-110 beginning of session --> 4-120  end of session  3/2:   and Therapeutic Activities:    [x] (35473 or 23161) Provided verbal/tactile cueing for activities related to improving balance, coordination, kinesthetic sense, posture, motor skill, proprioception and motor activation to allow for proper function of core, proximal hip and LE with self care and ADLs and functional mobility.   [] (57230) Gait Re-education- Provided training and instruction to the patient for proper LE, core and proximal hip recruitment and positioning and eccentric body weight control with ambulation re-education including up and down stairs     Home Exercise Program:    [x] (52818) Reviewed/Progressed HEP activities related to strengthening, flexibility, endurance, ROM of core, proximal hip and LE for functional self-care, mobility, lifting and ambulation/stair navigation   [] (00953)Reviewed/Progressed HEP activities related to improving balance, coordination, kinesthetic sense, posture, motor skill, proprioception of core, proximal hip and LE for self care, mobility, lifting, and ambulation/stair navigation      Manual Treatments:  PROM / STM / Oscillations-Mobs:  G-I, II, III, IV (PA's, Inf., Post.)  [x] (27768) Provided manual therapy to mobilize LE, proximal hip and/or LS spine soft tissue/joints for the purpose of modulating pain, promoting relaxation,  increasing ROM, reducing/eliminating soft tissue swelling/inflammation/restriction, improving soft tissue extensibility and allowing for proper ROM for normal function with self care, mobility, lifting and ambulation.             Modalities:     [x] GAME READY (VASO)- for significant edema, swelling, pain control.      Charges:  Timed Code Treatment Minutes: 50   Total Treatment Minutes: 65      [] EVAL (LOW) 28017 (typically 20 minutes face-to-face)  [] EVAL (MOD) 04846 (typically 30 minutes face-to-face)  [] EVAL (HIGH) 41304 (typically 45 minutes face-to-face)  [] RE-EVAL     [x] PARTH(73491) x 1    [] DRY NEEDLE 1 OR 2 MUSCLES  [x] NMR (68329) x 1    [] DRY NEEDLE 3+ MUSCLES  [x] Manual (92527) x 1     [] TA (95427) x     [] Mech Traction (16762)  [] ES(attended) (69722)     [] ES (un) (05416):   [] VASO (26135)  [] Other:      GOALS:  Patient stated goal: \"get my leg straight and be able to walk without crutch\"  []? Progressing: []? Met: [x]? Not Met: []? Adjusted     Therapist goals for Patient:   Short Term Goals: To be achieved in: 2 weeks  1. Independent in HEP and progression per patient tolerance, in order to prevent re-injury. []? Progressing: []? Met: [x]? Not Met: []? Adjusted  2. Patient will have a decrease in pain to facilitate improvement in movement, function, and ADLs as indicated by Functional Deficits. []? Progressing: []? Met: [x]? Not Met: []? Adjusted     Long Term Goals: To be achieved in: 8 weeks  1. Disability index score of 10% or less for the LEFS to assist with reaching prior level of function. []? Progressing: []? Met: [x]? Not Met: []? Adjusted  2. Patient will demonstrate increased AROM to full L knee to allow for proper joint functioning as indicated by patients Functional Deficits. []? Progressing: []? Met: [x]? Not Met: []? Adjusted  3. Patient will demonstrate an increase in Strength to good proximal hip strength and control, within 5lb HHD in LE to allow for proper functional mobility as indicated by patients Functional Deficits. []? Progressing: []? Met: [x]? Not Met: []? Adjusted  4. Patient will return to daily functional activities without increased symptoms or restriction. []? Progressing: []? Met: [x]? Not Met: []? Adjusted  5. Pt will be able to ambulate without crutch and with full knee extension and quad recruitment without increased symptoms or restrictions. []? Progressing: []? Met: [x]? Not Met: []? Adjusted      ASSESSMENT:  Continued focus of session on aggressive manual techniques and self stretching to restore L knee ROM followed by quad activation/strengthening tasks.  Patient has difficulty with quad activation due to limited knee extension. Sees physician for follow up to discuss plan and need for manipulation/knee arthroscopy to address continued knee ROM limitations of Friday. Return to Play: (if applicable)   [x]  Stage 1: Intro to Strength   []  Stage 2: Return to Run and Strength   []  Stage 3: Return to Jump and Strength   []  Stage 4: Dynamic Strength and Agility   []  Stage 5: Sport Specific Training     []  Ready to Return to Play, Meets All Above Stages   []  Not Ready for Return to Sports   Comments:            Treatment/Activity Tolerance:  [x] Patient tolerated treatment well [] Patient limited by fatique  [] Patient limited by pain  [] Patient limited by other medical complications  [] Other:     Overall Progression Towards Functional goals/ Treatment Progress Update:  [] Patient is progressing as expected towards functional goals listed. [x] Progression is slowed due to complexities/Impairments listed. [] Progression has been slowed due to co-morbidities. [] Plan just implemented, too soon to assess goals progression <30days   [] Goals require adjustment due to lack of progress  [] Patient is not progressing as expected and requires additional follow up with physician  [] Other    Prognosis for POC: [x] Good [] Fair  [] Poor    Patient requires continued skilled intervention: [x] Yes  [] No        PLAN: Increase frequency 3x/week w/ focus on manual therapy for restoring ROM. [x] Continue per plan of care [] Alter current plan (see comments)  [] Plan of care initiated [] Hold pending MD visit [] Discharge    Electronically signed by: Bandar Hernadez, [place], DPT, MS, SCS    Note: If patient does not return for scheduled/recommended follow up visits, this note will serve as a discharge from care along with the most recent update on progress.

## 2022-03-09 ENCOUNTER — HOSPITAL ENCOUNTER (OUTPATIENT)
Dept: PHYSICAL THERAPY | Age: 22
Setting detail: THERAPIES SERIES
Discharge: HOME | End: 2022-03-09
Payer: COMMERCIAL

## 2022-03-09 PROCEDURE — 97140 MANUAL THERAPY 1/> REGIONS: CPT

## 2022-03-09 PROCEDURE — 97110 THERAPEUTIC EXERCISES: CPT

## 2022-03-09 PROCEDURE — 97112 NEUROMUSCULAR REEDUCATION: CPT

## 2022-03-09 PROCEDURE — 97032 APPL MODALITY 1+ESTIM EA 15: CPT

## 2022-03-09 PROCEDURE — 20560 NDL INSJ W/O NJX 1 OR 2 MUSC: CPT

## 2022-03-09 NOTE — PLAN OF CARE
Patient follows up with his surgeon in [facility] this week to determine further need for surgical intervention. Physical Therapy Treatment Note/ Progress Report:     Date:  3/9/2022    Patient Name:  Socorro Cuello    :  2000  MRN: [id number]  Restrictions/Precautions:    Medical/Treatment Diagnosis Information:  · Diagnosis: Z48.89 encounter for other specified surgical aftercare  · Treatment Diagnosis: M25.562, M25.662, M62.559, R53.1, N96.4  Insurance/Certification information:  PT Insurance Information: BC[employer]; DED NOT MET; DN NOT COVERED; 80/20%; 60 VISITS/YEAR, 7 USED  Physician Information:  Referring Practitioner: Garibaldi Solid  Plan of care signed (Y/N):     Date of Patient follow up with Physician: 3/11     Progress Report: [x]  Yes  []  No     Date Range for reporting period:  Beginnin2022  Ending:      Progress report due (10 Rx/or 30 days whichever is less):     Recertification due (POC duration/ or 90 days whichever is less): 22     Visit # Insurance Allowable Auth Needed   14 60 ( 7 used) []Yes   []No     Latex Allergy:  [x]NO      []YES  Preferred Language for Healthcare:   [x]English       []other:  Functional Scale: LEFS: 33/80  Date assessed: 2022    Pain level:  0-4/10     SUBJECTIVE:  Arrived to session 25 minutes late. States that he was taking a nap and he overslept. Follows up with surgeon in [facility] this Friday.     OBJECTIVE:    Observation: ambulating w/ significant limitation in TKE at heel strike   Test measurements:      :  beginning of session  :   beginning of session --> 7-115 end of session  :   beginning of session -->4-115 end of session  :   beginning of session --> 5-115 end of session   :   beginning of session --> 2-118 end of session  :   beginning of session --> 2-120 end of session  :  8-110 beginning of session --> 4-120  end of session  3/2:   beginning of session -->3-120 end of session    3/8:   beginning of session --> 5-120 at end of session  Gait:  Ambulating without AD, but has significant limitation in terminal knee extension during stance phase on the L making gait mechanics antalgic during L stance. RESTRICTIONS/PRECAUTIONS: s/p L ACLr w/ patellar tendon graft,  DOS: 12/17/2021    Exercises/Interventions:     Therapeutic Ex (63623)  Sets/sec Reps Notes/CUES   Retro Stepper/BIKE 5 min     LAQ 20 deg ext lag   BOSU fwd/side lunge    Leg Press Iso/Con/Ecc 0- iso 30-45 deg   TKE 3 pl   Prone leg hang - 2# ankle weight  3 min    Incline calf stretch 30'' 5    Seated hamstring stretch 30'' 3    Quad set 10'' 10 Performed with NMES. See below. Seated heel slides    Prone TKEs 10s 10    CC TKE 5s 2x10 3 pl   SLR flexion 1 10 Cues to initiate with QS   Mod. BOSU lunge isos 10s 5    Gravity-assisted knee flexion on wall 10s 10                Manual Intervention (18883)      Knee mobs/PROM  4 min End range flex/ext   Tib/Fem Mobs  6 min Posteromedial, posterolateral   Patella Mobs  6 min Restricted superiorly   STM  6 min Posterior chain in prone leg hang; patellar tendon         NMR re-education (18567)   CUES NEEDED   Honduran/Biofeedback 10/10  5 min Quad set; 2 channel co contract   Sport cord march - yellow  2 10 Fwd/bwd; each direction   BFR      G. Med activation      Hip Ext full ROM/ G. Activation      Bosu Bal and Prop- G Med      Single leg stance/Balance/Prop      Bosu Retro G. Med act      Prone Hip froggers- sliders/elevated            Therapeutic Activity (11689)      Ladders      Feastie      Dynamic Balance                  DN + estim  10 min L hamstring           Therapeutic Exercise and NMR EXR  [x] (46927) Provided verbal/tactile cueing for activities related to strengthening, flexibility, endurance, ROM for improvements in LE, proximal hip, and core control with self care, mobility, lifting, ambulation.   [x] (11520) Provided verbal/tactile cueing for activities related to improving balance, coordination, kinesthetic sense, posture, motor skill, proprioception  to assist with LE, proximal hip, and core control in self care, mobility, lifting, ambulation and eccentric single leg control.      NMR and Therapeutic Activities:    [x] (83741 or 69096) Provided verbal/tactile cueing for activities related to improving balance, coordination, kinesthetic sense, posture, motor skill, proprioception and motor activation to allow for proper function of core, proximal hip and LE with self care and ADLs and functional mobility.   [] (19993) Gait Re-education- Provided training and instruction to the patient for proper LE, core and proximal hip recruitment and positioning and eccentric body weight control with ambulation re-education including up and down stairs     Home Exercise Program:    [x] (53902) Reviewed/Progressed HEP activities related to strengthening, flexibility, endurance, ROM of core, proximal hip and LE for functional self-care, mobility, lifting and ambulation/stair navigation   [] (69530)Reviewed/Progressed HEP activities related to improving balance, coordination, kinesthetic sense, posture, motor skill, proprioception of core, proximal hip and LE for self care, mobility, lifting, and ambulation/stair navigation      Manual Treatments:  PROM / STM / Oscillations-Mobs:  G-I, II, III, IV (PA's, Inf., Post.)  [x] (62251) Provided manual therapy to mobilize LE, proximal hip and/or LS spine soft tissue/joints for the purpose of modulating pain, promoting relaxation,  increasing ROM, reducing/eliminating soft tissue swelling/inflammation/restriction, improving soft tissue extensibility and allowing for proper ROM for normal function with self care, mobility, lifting and ambulation.        Spoke with Dr. Anny Park  regarding the use of Dry Needling      Dry needling manual therapy: consisted on the placement of 4 needles in the following muscles:  L hamstring.  A 30-50 mm needle was inserted, piston, rotated, and coned to produce intramuscular mobilization.  These techniques were used to restore functional range of motion, reduce muscle spasm and induce healing in the corresponding musculature. (85569)  Clean Technique was utilized today while applying Dry needling treatment.  The treatment sites where cleaned with 70% solution of  isopropyl alcohol .  The PT washed their hands and utilized treatment gloves along with hand  prior to inserting the needles.  All needles where removed and discarded in the appropriate sharps container. MD has given verbal and/or written approval for this treatment.      Attended low frequency (1-20Hz) electrical stimulation was utilized in conjunction with Dry Needling:  the Estim was manipulated between all above needles for a period of 10 min.  at 4-6 volts.  The low frequency electrical stimulation was used to help reduce muscle spasm and help to interrupt /[place] the pain cycle. (42160)        Modalities:     [x] GAME READY (VASO)- for significant edema, swelling, pain control. Charges:  Timed Code Treatment Minutes: 40   Total Treatment Minutes: 65      [] EVAL (LOW) 25513 (typically 20 minutes face-to-face)  [] EVAL (MOD) 43127 (typically 30 minutes face-to-face)  [] EVAL (HIGH) 46271 (typically 45 minutes face-to-face)  [] RE-EVAL     [x] RK(32229) x 1    [x] DRY NEEDLE 1 OR 2 MUSCLES  [x] NMR (14239) x 1    [] DRY NEEDLE 3+ MUSCLES  [x] Manual (38331) x 1     [] TA (94555) x     [] Mech Traction (21918)  [x] ES(attended) (74988)     [] ES (un) (41381):   [] VASO (82595)  [] Other:      GOALS:  Patient stated goal: \"get my leg straight and be able to walk without crutch\"  []? Progressing: []? Met: [x]? Not Met: []? Adjusted     Therapist goals for Patient:   Short Term Goals: To be achieved in: 2 weeks  1. Independent in HEP and progression per patient tolerance, in order to prevent re-injury. []? Progressing: []? Met: [x]?  Not Met: []? Adjusted  2. Patient will have a decrease in pain to facilitate improvement in movement, function, and ADLs as indicated by Functional Deficits. []? Progressing: []? Met: [x]? Not Met: []? Adjusted     Long Term Goals: To be achieved in: 8 weeks  1. Disability index score of 10% or less for the LEFS to assist with reaching prior level of function. []? Progressing: []? Met: [x]? Not Met: []? Adjusted  2. Patient will demonstrate increased AROM to full L knee to allow for proper joint functioning as indicated by patients Functional Deficits. []? Progressing: []? Met: [x]? Not Met: []? Adjusted  3. Patient will demonstrate an increase in strength to good proximal hip strength and control, within 5lb HHD in LE to allow for proper functional mobility as indicated by patients Functional Deficits. []? Progressing: []? Met: [x]? Not Met: []? Adjusted  4. Patient will return to daily functional activities without increased symptoms or restriction. []? Progressing: []? Met: [x]? Not Met: []? Adjusted  5. Pt will be able to ambulate without crutch and with full knee extension and quad recruitment without increased symptoms or restrictions. []? Progressing: []? Met: [x]? Not Met: []? Adjusted      ASSESSMENT:  Patient is approximately 11 weeks s/p L ACL-R with patellar tendon graft on 12/17/21. Patient has been coming to PT 3x per week for the past 4-5 weeks due to significant limitations in L knee extension and flexion ROM. Focus of sessions has been on aggressive manual techniques and self stretching to restore L knee ROM followed by quad activation/strengthening tasks to improve quad control and gait mechanics. The best ROM patient has achieved in that time is 2-120, however, patient does not keep that range consistently between sessions. Patient's strength and functional progression are definitely still limited by his limitations in L knee ROM.  Patient follows up with his surgeon in [facility] this week to determine further need for surgical intervention. Return to Play: (if applicable)   [x]  Stage 1: Intro to Strength   []  Stage 2: Return to Run and Strength   []  Stage 3: Return to Jump and Strength   []  Stage 4: Dynamic Strength and Agility   []  Stage 5: Sport Specific Training     []  Ready to Return to Play, Meets All Above Stages   []  Not Ready for Return to Sports   Comments:            Treatment/Activity Tolerance:  [x] Patient tolerated treatment well [] Patient limited by fatique  [] Patient limited by pain  [] Patient limited by other medical complications  [] Other:     Overall Progression Towards Functional goals/ Treatment Progress Update:  [] Patient is progressing as expected towards functional goals listed. [x] Progression is slowed due to complexities/Impairments listed. [] Progression has been slowed due to co-morbidities. [] Plan just implemented, too soon to assess goals progression <30days   [] Goals require adjustment due to lack of progress  [] Patient is not progressing as expected and requires additional follow up with physician  [] Other    Prognosis for POC: [x] Good [] Fair  [] Poor    Patient requires continued skilled intervention: [x] Yes  [] No        PLAN: Increase frequency 3x/week w/ focus on manual therapy for restoring ROM. [x] Continue per plan of care [] Alter current plan (see comments)  [] Plan of care initiated [] Hold pending MD visit [] Discharge    Electronically signed by: Eleanor Leonard, PT, DPT, MS, SCS    Note: If patient does not return for scheduled/recommended follow up visits, this note will serve as a discharge from care along with the most recent update on progress.

## 2022-03-11 ENCOUNTER — APPOINTMENT (OUTPATIENT)
Dept: PHYSICAL THERAPY | Age: 22
End: 2022-03-11
Payer: COMMERCIAL

## 2022-03-28 ENCOUNTER — HOSPITAL ENCOUNTER (OUTPATIENT)
Dept: PHYSICAL THERAPY | Age: 22
Setting detail: THERAPIES SERIES
Discharge: HOME | End: 2022-03-28
Payer: COMMERCIAL

## 2022-03-28 PROCEDURE — 97016 VASOPNEUMATIC DEVICE THERAPY: CPT

## 2022-03-28 PROCEDURE — 97110 THERAPEUTIC EXERCISES: CPT

## 2022-03-28 PROCEDURE — 97164 PT RE-EVAL EST PLAN CARE: CPT

## 2022-03-28 PROCEDURE — 97140 MANUAL THERAPY 1/> REGIONS: CPT

## 2022-03-28 NOTE — PLAN OF CARE
[address]  Phone: [phone] Fax: [phone]      Physical Therapy Re-Certification Plan of Care/MD UPDATE      Dear Lilliam George,    We had the pleasure of treating the following patient for physical therapy services at [address] [place] [address]. A summary of our findings can be found in the updated assessment below. This includes our plan of care. If you have any questions or concerns regarding these findings, please do not hesitate to contact me at the office phone number checked above. Thank you for the referral.     Physician Signature:________________________________Date:__________________  By signing above (or electronic signature), therapists plan is approved by physician    Total Visits to Date: 13  Overall Response to Treatment:   []Patient is responding well to treatment and improvement is noted with regards  to goals   []Patient should continue to improve in reasonable time if they continue HEP   []Patient has plateaued and is no longer responding to skilled PT intervention    []Patient is getting worse and would benefit from return to referring MD   []Patient unable to adhere to initial POC   [x]Other: Patient had L knee scope and scar tissue debridement on 3/14/22 at home in [facility]. Has had 3 total PT sessions since recent L knee scope surgery. Patient has a CPM that he was instructed to use twice a day. Patient had not yet had sutures removed from scope holes following most recent L knee scope surgery. Patient is 14 days s/p, so removed scope hole sutures today. Medial femoral condyle and lateral femoral condyle incision site are more red and irritated surrounding the scope hole. Will continue to monitor for any signs of infection. L knee ROM reassessed today at - at start of session, but achieved -3-110 with manual therapy and LLLD stretching/self stretching.  Updated HEP to be performed at least twice a day to ensure continued carryover and progression in mobility with continued use of CPM twice a day as instructed by physician. Patient to be seen 3x per week at this time. Patient will continue to benefit from skilled PT to address L knee ROM/joint mobility, quad activation/strength, stability, balance, gait, and functional deficits to enable full, safe return to PLOF. Physical Therapy Treatment Note/ Progress Report:     Date:  3/28/2022    Patient Name:  Tarah Siddiqi    :  2000  MRN: [id number]  Restrictions/Precautions:    Medical/Treatment Diagnosis Information:  · Diagnosis: Z48.89 encounter for other specified surgical aftercare  · Treatment Diagnosis: M25.562, M25.662, M62.559, R53.1, U25.1  Insurance/Certification information:  PT Insurance Information: [facility]; DED NOT MET; DN NOT COVERED; 80/20%; 60 VISITS/YEAR, 7 USED  Physician Information:  Referring Practitioner: Shelbi Owens  Plan of care signed (Y/N):     Date of Patient follow up with Physician:      Progress Report: [x]  Yes  []  No     Date Range for reporting period:  Beginnin2022  Ending:      Progress report due (10 Rx/or 30 days whichever is less):     Recertification due (POC duration/ or 90 days whichever is less): 22     Visit # Insurance Allowable Auth Needed   14 60 ( 7 used) []Yes   []No     Latex Allergy:  [x]NO      []YES  Preferred Language for Healthcare:   [x]English       []other:  Functional Scale: LEFS: 33/80  Date assessed: 2022    Pain level:  0-4/10     SUBJECTIVE:  Patient had L knee scope and scar tissue debridement on 3/14/22 at home in [facility]. Has had 3 total PT sessions since recent L knee scope surgery. Patient has a CPM that he was instructed to use twice a day.      OBJECTIVE:    Observation: ambulating w/ significant limitation in TKE at heel strike   Test measurements:      :  beginning of session  2:   beginning of session --> 7-115 end of session  2/18:   beginning of session -->4-115 end of session  2/21:   beginning of session --> 5-115 end of session   2/23:   beginning of session --> 2-118 end of session  2/25:   beginning of session --> 2-120 end of session  2/28:  8-110 beginning of session --> 4-120  end of session  3/2:   beginning of session -->3-120 end of session    3/8:   beginning of session --> 5-120 at end of session    3/28/22: 2 weeks post-op L knee scope: - beginning of session --> -3-110 end of session  Gait:  Ambulating without AD, but has significant limitation in terminal knee extension during stance phase on the L making gait mechanics antalgic during L stance. RESTRICTIONS/PRECAUTIONS: s/p L ACLr w/ patellar tendon graft,  DOS: 12/17/2021 L knee scope with scar tissue debridement on 3/14/22. Exercises/Interventions:     Therapeutic Ex (55481)  Sets/sec Reps Notes/CUES   Retro Stepper/BIKE 5 min     LAQ 20 deg ext lag   BOSU fwd/side lunge    Leg Press Iso/Con/Ecc 0- iso 30-45 deg   TKE 3 pl   Seated bag hang - 5# ankle weight  5 min For L knee ext   Prone leg hang - 2# ankle weight  3 min For L knee ext   Incline calf stretch 30'' 5    Seated hamstring stretch w/self OP 30s 5    Quad set 10s 10 Performed with NMES. See below. Seated heel slides    Prone TKEs 10s 10    CC TKE 5s 2x10 3 pl   SLR flexion 1 10 Cues to initiate with QS   Mod.  BOSU lunge isos 10s 5    Gravity-assisted knee flexion on wall 10s 10                Manual Intervention (12139)      Knee mobs/PROM  4 min End range ext focus   Tib/Fem Mobs  6 min Posteromedial, posterolateral   Patella Mobs  6 min Restricted superiorly/inferiorly   STM   Posterior chain in prone leg hang; patellar tendon         NMR re-education (36526)   CUES NEEDED   [place]er/Biofeedback 10/10  5 min Quad set; 2 channel co contract   Sport cord march - yellow  2 10 Fwd/bwd; each direction   BFR      G. Med activation Hip Ext full ROM/ G. Activation      Bosu Bal and Prop- G Med      Single leg stance/Balance/Prop      Bosu Retro G. Med act      Prone Hip froggers- sliders/elevated            Therapeutic Activity (68555)      Ladders      Plyos      Dynamic Balance                  DN + estim   L hamstring         Access Code: [email or web address]  URL: [email or web address]  Date: 03/28/2022  Prepared by: Donald Hassan    Exercises  Prone Knee Extension Hang - 2-3 x daily - 7 x weekly - 1 sets - 5-10 min hold  Seated Knee Extension Stretch with Chair - 2-3 x daily - 7 x weekly - 1 sets - 5-10 min hold  Seated Table Hamstring Stretch - 2-3 x daily - 7 x weekly - 1 sets - 5 reps - 20s-30s hold  Long Sitting Quad Set - 2-3 x daily - 7 x weekly - 3 sets - 10 reps  Prone Terminal Knee Extension - 2-3 x daily - 7 x weekly - 1-2 sets - 10 reps - 10s hold  Supine Knee Flexion Wall Slide - 2-3 x daily - 7 x weekly - 1 sets - 10 reps - 10s hold      Therapeutic Exercise and NMR EXR  [x] (04778) Provided verbal/tactile cueing for activities related to strengthening, flexibility, endurance, ROM for improvements in LE, proximal hip, and core control with self care, mobility, lifting, ambulation. [x] (17843) Provided verbal/tactile cueing for activities related to improving balance, coordination, kinesthetic sense, posture, motor skill, proprioception  to assist with LE, proximal hip, and core control in self care, mobility, lifting, ambulation and eccentric single leg control.      NMR and Therapeutic Activities:    [x] (27042 or 04904) Provided verbal/tactile cueing for activities related to improving balance, coordination, kinesthetic sense, posture, motor skill, proprioception and motor activation to allow for proper function of core, proximal hip and LE with self care and ADLs and functional mobility.   [] (50676) Gait Re-education- Provided training and instruction to the patient for proper LE, core and proximal hip recruitment and positioning and eccentric body weight control with ambulation re-education including up and down stairs     Home Exercise Program:    [x] (32638) Reviewed/Progressed HEP activities related to strengthening, flexibility, endurance, ROM of core, proximal hip and LE for functional self-care, mobility, lifting and ambulation/stair navigation   [] (56218)Reviewed/Progressed HEP activities related to improving balance, coordination, kinesthetic sense, posture, motor skill, proprioception of core, proximal hip and LE for self care, mobility, lifting, and ambulation/stair navigation      Manual Treatments:  PROM / STM / Oscillations-Mobs:  G-I, II, III, IV (PA's, Inf., Post.)  [x] (59259) Provided manual therapy to mobilize LE, proximal hip and/or LS spine soft tissue/joints for the purpose of modulating pain, promoting relaxation,  increasing ROM, reducing/eliminating soft tissue swelling/inflammation/restriction, improving soft tissue extensibility and allowing for proper ROM for normal function with self care, mobility, lifting and ambulation.             Modalities:     [x] GAME READY (VASO)- for significant edema, swelling, pain control. - 15 minutes    Charges:  Timed Code Treatment Minutes: 35   Total Treatment Minutes: 65      [] EVAL (LOW) 39139 (typically 20 minutes face-to-face)  [] EVAL (MOD) 48718 (typically 30 minutes face-to-face)  [] EVAL (HIGH) 62829 (typically 45 minutes face-to-face)  [x] RE-EVAL     [x] AH(35936) x 1    [] DRY NEEDLE 1 OR 2 MUSCLES  [] NMR (91969) x     [] DRY NEEDLE 3+ MUSCLES  [x] Manual (58855) x 1     [] TA (22293) x     [] [facility] (165[facility]  [] ES(attended) (61165)     [] ES (un) (71544):   [x] VASO (31039)  [] Other:      GOALS:  Patient stated goal: \"get my leg straight and be able to walk without crutch\"  []? Progressing: []? Met: [x]? Not Met: []? Adjusted     Therapist goals for Patient:   Short Term Goals: To be achieved in: 2 weeks  1.  Independent in HEP and progression per patient tolerance, in order to prevent re-injury. []? Progressing: []? Met: [x]? Not Met: []? Adjusted  2. Patient will have a decrease in pain to facilitate improvement in movement, function, and ADLs as indicated by Functional Deficits. []? Progressing: []? Met: [x]? Not Met: []? Adjusted     Long Term Goals: To be achieved in: 8 weeks  1. Disability index score of 10% or less for the LEFS to assist with reaching prior level of function. []? Progressing: []? Met: [x]? Not Met: []? Adjusted  2. Patient will demonstrate increased AROM to full L knee to allow for proper joint functioning as indicated by patients Functional Deficits. []? Progressing: []? Met: [x]? Not Met: []? Adjusted  3. Patient will demonstrate an increase in strength to good proximal hip strength and control, within 5lb HHD in LE to allow for proper functional mobility as indicated by patients Functional Deficits. []? Progressing: []? Met: [x]? Not Met: []? Adjusted  4. Patient will return to daily functional activities without increased symptoms or restriction. []? Progressing: []? Met: [x]? Not Met: []? Adjusted  5. Pt will be able to ambulate without crutch and with full knee extension and quad recruitment without increased symptoms or restrictions. []? Progressing: []? Met: [x]? Not Met: []? Adjusted      ASSESSMENT:  Patient had L knee scope and scar tissue debridement on 3/14/22 at home in [facility]. Has had 3 total PT sessions since recent L knee scope surgery. Patient has a CPM that he was instructed to use twice a day. Patient had not yet had sutures removed from scope holes following most recent L knee scope surgery. Patient is 14 days s/p, so removed scope hole sutures today. Medial femoral condyle and lateral femoral condyle incision site are more red and irritated surrounding the scope hole. Will continue to monitor for any signs of infection.  L knee ROM reassessed today at - at start of session, but achieved -3-110 with manual therapy and LLLD stretching/self stretching. Updated HEP to be performed at least twice a day to ensure continued carryover and progression in mobility with continued use of CPM twice a day as instructed by physician. Patient to be seen 3x per week at this time. Patient will continue to benefit from skilled PT to address L knee ROM/joint mobility, quad activation/strength, stability, balance, gait, and functional deficits to enable full, safe return to PLOF. Return to Play: (if applicable)   [x]  Stage 1: Intro to Strength   []  Stage 2: Return to Run and Strength   []  Stage 3: Return to Jump and Strength   []  Stage 4: Dynamic Strength and Agility   []  Stage 5: Sport Specific Training     []  Ready to Return to Play, Meets All Above Stages   []  Not Ready for Return to Sports   Comments:            Treatment/Activity Tolerance:  [x] Patient tolerated treatment well [] Patient limited by fatique  [] Patient limited by pain  [] Patient limited by other medical complications  [] Other:     Overall Progression Towards Functional goals/ Treatment Progress Update:  [] Patient is progressing as expected towards functional goals listed. [x] Progression is slowed due to complexities/Impairments listed. [] Progression has been slowed due to co-morbidities. [] Plan just implemented, too soon to assess goals progression <30days   [] Goals require adjustment due to lack of progress  [] Patient is not progressing as expected and requires additional follow up with physician  [] Other    Prognosis for POC: [x] Good [] Fair  [] Poor    Patient requires continued skilled intervention: [x] Yes  [] No        PLAN: Increase frequency 3x/week w/ focus on manual therapy for restoring ROM.   [x] Continue per plan of care [] Alter current plan (see comments)  [] Plan of care initiated [] Hold pending MD visit [] Discharge    Electronically signed by: Charo Arias, PT, DPT, MS, SCS    Note: If patient does not return for scheduled/recommended follow up visits, this note will serve as a discharge from care along with the most recent update on progress.

## 2022-03-30 ENCOUNTER — HOSPITAL ENCOUNTER (OUTPATIENT)
Dept: PHYSICAL THERAPY | Age: 22
Setting detail: THERAPIES SERIES
Discharge: HOME | End: 2022-03-30
Payer: COMMERCIAL

## 2022-03-30 PROCEDURE — 97140 MANUAL THERAPY 1/> REGIONS: CPT

## 2022-03-30 PROCEDURE — 97110 THERAPEUTIC EXERCISES: CPT

## 2022-03-30 NOTE — FLOWSHEET NOTE
[place] [address] [facility]  Phone: [phone] Fax: [phone]      Physical Therapy Treatment Note/ Progress Report:     Date:  3/30/2022    Patient Name:  Arti Ramos    :  2000  MRN: [id number]  Restrictions/Precautions:    Medical/Treatment Diagnosis Information:  · Diagnosis: Z48.89 encounter for other specified surgical aftercare  · Treatment Diagnosis: M25.562, M25.662, M62.559, R53.1, I34.1  Insurance/Certification information:  PT Insurance Information: BCBS; DED NOT MET; DN NOT COVERED; 80/20%; 60 VISITS/YEAR, 7 USED  Physician Information:  Referring Practitioner: Bin Clemons of care signed (Y/N):     Date of Patient follow up with Physician:      Progress Report: []  Yes  [x]  No     Date Range for reporting period:  Beginnin2022  Ending:      Progress report due (10 Rx/or 30 days whichever is less):     Recertification due (POC duration/ or 90 days whichever is less): 22     Visit # Insurance Allowable Auth Needed   29 45 ( 7 used) []Yes   []No     Latex Allergy:  [x]NO      []YES  Preferred Language for Healthcare:   [x]English       []other:  Functional Scale: LEFS: 33/80  Date assessed: 2022    Pain level:  0-4/10     SUBJECTIVE:  Patient report significant tightness in quad tendon, calf and hamstring. States he is doing HEP multiple times per day.        OBJECTIVE:    Observation: ambulating w/ significant limitation in TKE at heel strike   Test measurements:      :  beginning of session  :   beginning of session --> 7-115 end of session  :   beginning of session -->4-115 end of session  :   beginning of session --> 5-115 end of session   :   beginning of session --> 2-118 end of session  :   beginning of session --> 2-120 end of session  :  8-110 beginning of session --> 4-120  end of session  3/2:  beginning of session -->3-120 end of session    3/8:   beginning of session --> 5-120 at end of session    3/28/22: 2 weeks post-op L knee scope: - beginning of session --> -3-110 end of session  Gait:  Ambulating without AD, but has significant limitation in terminal knee extension during stance phase on the L making gait mechanics antalgic during L stance. 3/30/22: -8-110 end of session      RESTRICTIONS/PRECAUTIONS: s/p L ACLr w/ patellar tendon graft,  DOS: 12/17/2021 L knee scope with scar tissue debridement on 3/14/22. Exercises/Interventions:     Therapeutic Ex (60183)  Sets/sec Reps Notes/CUES   Retro Stepper/BIKE 5 min     LAQ 20 deg ext lag   BOSU fwd/side lunge    Leg Press Iso/Con/Ecc 0- iso 30-45 deg   TKE 3 pl   Seated bag hang - 5# ankle weight  5 min For L knee ext   Prone leg hang - 2# ankle weight  3 min For L knee ext   Incline calf stretch 30'' 5    Seated hamstring stretch w/self OP 30s 5    Quad set 10s 10 Performed with NMES. See below. Seated heel slides    Prone TKEs 10s 10    CC TKE 5s 2x10 3 pl   SLR flexion 1 10 Cues to initiate with QS   Mod. BOSU lunge isos 10s 5    Gravity-assisted knee flexion on wall 10s 10                Manual Intervention (81510)      Knee mobs/PROM  4 min End range ext focus   Tib/Fem Mobs  6 min Posteromedial, posterolateral   Patella Mobs  6 min Restricted superiorly/inferiorly   STM  6 min Posterior chain in prone leg hang         NMR re-education (80304)   CUES NEEDED   [place]/Biofeedback 10/10  5 min Quad set; 2 channel co contract   Sport cord march - yellow  2 10 Fwd/bwd; each direction   BFR      G. Med activation      Hip Ext full ROM/ G.  Activation      Bosu Bal and Prop- G Med      Single leg stance/Balance/Prop      Bosu Retro G. Med act      Prone Hip froggers- sliders/elevated            Therapeutic Activity (15907)      Ladders      Plyos      Dynamic Balance                  DN + estim   L hamstring         Access Code: [email or web address]  URL: [email or web address]  Date: 03/28/2022  Prepared by: Melvin James    Exercises  Prone Knee Extension Hang - 2-3 x daily - 7 x weekly - 1 sets - 5-10 min hold  Seated Knee Extension Stretch with Chair - 2-3 x daily - 7 x weekly - 1 sets - 5-10 min hold  Seated Table Hamstring Stretch - 2-3 x daily - 7 x weekly - 1 sets - 5 reps - 20s-30s hold  Long Sitting Quad Set - 2-3 x daily - 7 x weekly - 3 sets - 10 reps  Prone Terminal Knee Extension - 2-3 x daily - 7 x weekly - 1-2 sets - 10 reps - 10s hold  Supine Knee Flexion Wall Slide - 2-3 x daily - 7 x weekly - 1 sets - 10 reps - 10s hold      Therapeutic Exercise and NMR EXR  [x] (15738) Provided verbal/tactile cueing for activities related to strengthening, flexibility, endurance, ROM for improvements in LE, proximal hip, and core control with self care, mobility, lifting, ambulation. [x] (25277) Provided verbal/tactile cueing for activities related to improving balance, coordination, kinesthetic sense, posture, motor skill, proprioception  to assist with LE, proximal hip, and core control in self care, mobility, lifting, ambulation and eccentric single leg control.      NMR and Therapeutic Activities:    [x] (66732 or 23034) Provided verbal/tactile cueing for activities related to improving balance, coordination, kinesthetic sense, posture, motor skill, proprioception and motor activation to allow for proper function of core, proximal hip and LE with self care and ADLs and functional mobility.   [] (19226) Gait Re-education- Provided training and instruction to the patient for proper LE, core and proximal hip recruitment and positioning and eccentric body weight control with ambulation re-education including up and down stairs     Home Exercise Program:    [x] (54788) Reviewed/Progressed HEP activities related to strengthening, flexibility, endurance, ROM of core, proximal hip and LE for functional self-care, mobility, lifting and ambulation/stair navigation   [] (82067)Reviewed/Progressed HEP activities related to improving balance, coordination, kinesthetic sense, posture, motor skill, proprioception of core, proximal hip and LE for self care, mobility, lifting, and ambulation/stair navigation      Manual Treatments:  PROM / STM / Oscillations-Mobs:  G-I, II, III, IV (PA's, Inf., Post.)  [x] (25802) Provided manual therapy to mobilize LE, proximal hip and/or LS spine soft tissue/joints for the purpose of modulating pain, promoting relaxation,  increasing ROM, reducing/eliminating soft tissue swelling/inflammation/restriction, improving soft tissue extensibility and allowing for proper ROM for normal function with self care, mobility, lifting and ambulation.             Modalities:     [x] GAME READY (VASO)- for significant edema, swelling, pain control. - 10 minutes    Charges:  Timed Code Treatment Minutes: 50   Total Treatment Minutes: 60      [] EVAL (LOW) 73846 (typically 20 minutes face-to-face)  [] EVAL (MOD) 87207 (typically 30 minutes face-to-face)  [] EVAL (HIGH) 44284 (typically 45 minutes face-to-face)  [] RE-EVAL     [x] DN(19071) x 2    [] DRY NEEDLE 1 OR 2 MUSCLES  [] NMR (99986) x     [] DRY NEEDLE 3+ MUSCLES  [x] Manual (68422) x 1     [] TA (98797) x     [] Mech Traction (37561)  [] ES(attended) (15482)     [] ES (un) (39164):   [] VASO (44633)  [] Other:      GOALS:  Patient stated goal: \"get my leg straight and be able to walk without crutch\"  []? Progressing: []? Met: [x]? Not Met: []? Adjusted     Therapist goals for Patient:   Short Term Goals: To be achieved in: 2 weeks  1. Independent in HEP and progression per patient tolerance, in order to prevent re-injury. []? Progressing: []? Met: [x]? Not Met: []? Adjusted  2. Patient will have a decrease in pain to facilitate improvement in movement, function, and ADLs as indicated by Functional Deficits. []? Progressing: []? Met: [x]? Not Met: []?  Adjusted     Long Term Goals: To be achieved in: 8 weeks  1. Disability index score of 10% or less for the LEFS to assist with reaching prior level of function. []? Progressing: []? Met: [x]? Not Met: []? Adjusted  2. Patient will demonstrate increased AROM to full L knee to allow for proper joint functioning as indicated by patients Functional Deficits. []? Progressing: []? Met: [x]? Not Met: []? Adjusted  3. Patient will demonstrate an increase in strength to good proximal hip strength and control, within 5lb HHD in LE to allow for proper functional mobility as indicated by patients Functional Deficits. []? Progressing: []? Met: [x]? Not Met: []? Adjusted  4. Patient will return to daily functional activities without increased symptoms or restriction. []? Progressing: []? Met: [x]? Not Met: []? Adjusted  5. Pt will be able to ambulate without crutch and with full knee extension and quad recruitment without increased symptoms or restrictions. []? Progressing: []? Met: [x]? Not Met: []? Adjusted      ASSESSMENT:  Patient had L knee scope and scar tissue debridement on 3/14/22 at home in [facility]. Has had 4 total PT sessions since recent L knee scope surgery. L knee PROM -8-110 by end of session. Pt has significant soft tissue restriction through medial > lateral gastroc, mobility restrictions through quad tendon, patellar tendon, patellar mobility and tib fem mobility. Pt guarded w/ any attempt at overpressure today leaving range at empty feel. Patient to be seen 3x per week at this time. Patient will continue to benefit from skilled PT to address L knee ROM/joint mobility, quad activation/strength, stability, balance, gait, and functional deficits to enable full, safe return to PLOF.      Return to Play: (if applicable)   [x]  Stage 1: Intro to Strength   []  Stage 2: Return to Run and Strength   []  Stage 3: Return to Jump and Strength   []  Stage 4: Dynamic Strength and Agility   []  Stage 5: Sport Specific Training     [] Ready to Return to Play, Meets All Above Stages   []  Not Ready for Return to Sports   Comments:            Treatment/Activity Tolerance:  [x] Patient tolerated treatment well [] Patient limited by fatique  [] Patient limited by pain  [] Patient limited by other medical complications  [] Other:     Overall Progression Towards Functional goals/ Treatment Progress Update:  [] Patient is progressing as expected towards functional goals listed. [x] Progression is slowed due to complexities/Impairments listed. [] Progression has been slowed due to co-morbidities. [] Plan just implemented, too soon to assess goals progression <30days   [] Goals require adjustment due to lack of progress  [] Patient is not progressing as expected and requires additional follow up with physician  [] Other    Prognosis for POC: [x] Good [] Fair  [] Poor    Patient requires continued skilled intervention: [x] Yes  [] No        PLAN: Increase frequency 3x/week w/ focus on manual therapy for restoring ROM. [x] Continue per plan of care [] Alter current plan (see comments)  [] Plan of care initiated [] Hold pending MD visit [] Discharge    Electronically signed by: May Hartley PT    Note: If patient does not return for scheduled/recommended follow up visits, this note will serve as a discharge from care along with the most recent update on progress.

## 2022-03-31 ENCOUNTER — HOSPITAL ENCOUNTER (OUTPATIENT)
Dept: PHYSICAL THERAPY | Age: 22
Setting detail: THERAPIES SERIES
Discharge: HOME | End: 2022-03-31
Payer: COMMERCIAL

## 2022-03-31 PROCEDURE — 97140 MANUAL THERAPY 1/> REGIONS: CPT

## 2022-03-31 PROCEDURE — 97110 THERAPEUTIC EXERCISES: CPT

## 2022-03-31 NOTE — FLOWSHEET NOTE
[place] [address] [facility]  Phone: [phone] Fax: [phone]      Physical Therapy Treatment Note/ Progress Report:     Date:  3/31/2022    Patient Name:  Harika Balderas    :  2000  MRN: [id number]  Restrictions/Precautions:    Medical/Treatment Diagnosis Information:  · Diagnosis: Z48.89 encounter for other specified surgical aftercare  · Treatment Diagnosis: M25.562, M25.662, M62.559, R53.1, Y10.4  Insurance/Certification information:  PT Insurance Information: BCBS; DED NOT MET; DN NOT COVERED; 80/20%; 60 VISITS/YEAR, 7 USED  Physician Information:  Referring Practitioner: Kapil Cost of care signed (Y/N):     Date of Patient follow up with Physician:      Progress Report: []  Yes  [x]  No     Date Range for reporting period:  Beginnin2022  Ending:      Progress report due (10 Rx/or 30 days whichever is less):     Recertification due (POC duration/ or 90 days whichever is less): 22     Visit # Insurance Allowable Auth Needed   93 92 ( 7 used) []Yes   []No     Latex Allergy:  [x]NO      []YES  Preferred Language for Healthcare:   [x]English       []other:  Functional Scale: LEFS: 33/80  Date assessed: 2022    Pain level:  0-4/10     SUBJECTIVE:  Patient reports that he worked on extension ROM prior to PT this am. States that he is stiff and sore partially due to it being morning. Still sore in quad tendon and medial and lateral quad.        OBJECTIVE:    Observation: ambulating w/ significant limitation in TKE at heel strike   Test measurements:      :  beginning of session  :   beginning of session --> 7-115 end of session  :   beginning of session -->4-115 end of session  :   beginning of session --> 5-115 end of session   :   beginning of session --> 2-118 end of session  :   beginning of session --> 2-120 end of session  2/28:  8-110 beginning of session --> 4-120  end of session  3/2:   beginning of session -->3-120 end of session    3/8:   beginning of session --> 5-120 at end of session    3/28/22: 2 weeks post-op L knee scope: - beginning of session --> -3-110 end of session  Gait:  Ambulating without AD, but has significant limitation in terminal knee extension during stance phase on the L making gait mechanics antalgic during L stance. 3/30/22: -8-110 end of session      RESTRICTIONS/PRECAUTIONS: s/p L ACLr w/ patellar tendon graft,  DOS: 12/17/2021 L knee scope with scar tissue debridement on 3/14/22. Exercises/Interventions:     Therapeutic Ex (26000)  Sets/sec Reps Notes/CUES   Retro Stepper/BIKE 5 min     LAQ 20 deg ext lag   BOSU fwd/side lunge    Leg Press Iso/Con/Ecc 0- iso 30-45 deg   TKE 3 pl   Seated bag hang - 5# ankle weight  5 min For L knee ext   Prone leg hang - 2# ankle weight  3 min For L knee ext   Incline calf stretch 30'' 5    Seated hamstring stretch w/self OP 30s 5    Quad set 10s 10 Performed with NMES. See below. Seated heel slides    Prone TKEs 10s 10    CC TKE 5s 2x10 3 pl   SLR flexion 1 10 Cues to initiate with QS   Mod. BOSU lunge isos 10s 5    Gravity-assisted knee flexion on wall 10s 10                Manual Intervention (53576)      Knee mobs/PROM  4 min End range ext focus   Tib/Fem Mobs  6 min Posteromedial, posterolateral   Patella Mobs  6 min Restricted superiorly/inferiorly   STM  6 min Posterior chain in prone leg hang         NMR re-education (69132)   CUES NEEDED   [place]/Biofeedback 10/10  5 min Quad set; 2 channel co contract   Sport cord march - yellow  2 10 Fwd/bwd; each direction   BFR      G. Med activation      Hip Ext full ROM/ G.  Activation      Bosu Bal and Prop- G Med      Single leg stance/Balance/Prop      Bosu Retro G. Med act      Prone Hip froggers- sliders/elevated            Therapeutic Activity (12283)      Ladders      Plyos Dynamic Balance                  DN + estim   L hamstring         Access Code: [email or web address]  URL: [email or web address]  Date: 03/28/2022  Prepared by: Ricardo Major    Exercises  Prone Knee Extension Hang - 2-3 x daily - 7 x weekly - 1 sets - 5-10 min hold  Seated Knee Extension Stretch with Chair - 2-3 x daily - 7 x weekly - 1 sets - 5-10 min hold  Seated Table Hamstring Stretch - 2-3 x daily - 7 x weekly - 1 sets - 5 reps - 20s-30s hold  Long Sitting Quad Set - 2-3 x daily - 7 x weekly - 3 sets - 10 reps  Prone Terminal Knee Extension - 2-3 x daily - 7 x weekly - 1-2 sets - 10 reps - 10s hold  Supine Knee Flexion Wall Slide - 2-3 x daily - 7 x weekly - 1 sets - 10 reps - 10s hold      Therapeutic Exercise and NMR EXR  [x] (13264) Provided verbal/tactile cueing for activities related to strengthening, flexibility, endurance, ROM for improvements in LE, proximal hip, and core control with self care, mobility, lifting, ambulation. [x] (80120) Provided verbal/tactile cueing for activities related to improving balance, coordination, kinesthetic sense, posture, motor skill, proprioception  to assist with LE, proximal hip, and core control in self care, mobility, lifting, ambulation and eccentric single leg control.      NMR and Therapeutic Activities:    [x] (07974 or 41959) Provided verbal/tactile cueing for activities related to improving balance, coordination, kinesthetic sense, posture, motor skill, proprioception and motor activation to allow for proper function of core, proximal hip and LE with self care and ADLs and functional mobility.   [] (56545) Gait Re-education- Provided training and instruction to the patient for proper LE, core and proximal hip recruitment and positioning and eccentric body weight control with ambulation re-education including up and down stairs     Home Exercise Program:    [x] (11297) Reviewed/Progressed HEP activities related to strengthening, flexibility, endurance, ROM of core, proximal hip and LE for functional self-care, mobility, lifting and ambulation/stair navigation   [] (92261)Reviewed/Progressed HEP activities related to improving balance, coordination, kinesthetic sense, posture, motor skill, proprioception of core, proximal hip and LE for self care, mobility, lifting, and ambulation/stair navigation      Manual Treatments:  PROM / STM / Oscillations-Mobs:  G-I, II, III, IV (PA's, Inf., Post.)  [x] (71339) Provided manual therapy to mobilize LE, proximal hip and/or LS spine soft tissue/joints for the purpose of modulating pain, promoting relaxation,  increasing ROM, reducing/eliminating soft tissue swelling/inflammation/restriction, improving soft tissue extensibility and allowing for proper ROM for normal function with self care, mobility, lifting and ambulation.             Modalities:     [x] GAME READY (VASO)- for significant edema, swelling, pain control. - 10 minutes    Charges:  Timed Code Treatment Minutes: 50   Total Treatment Minutes: 60      [] EVAL (LOW) 53968 (typically 20 minutes face-to-face)  [] EVAL (MOD) 89810 (typically 30 minutes face-to-face)  [] EVAL (HIGH) 61063 (typically 45 minutes face-to-face)  [] RE-EVAL     [x] JJ(75129) x 2    [] DRY NEEDLE 1 OR 2 MUSCLES  [] NMR (33416) x     [] DRY NEEDLE 3+ MUSCLES  [x] Manual (39439) x 1     [] TA (12197) x     [] Mech Traction (95784)  [] ES(attended) (32426)     [] ES (un) (90119):   [] VASO (32741)  [] Other:      GOALS:  Patient stated goal: \"get my leg straight and be able to walk without crutch\"  []? Progressing: []? Met: [x]? Not Met: []? Adjusted     Therapist goals for Patient:   Short Term Goals: To be achieved in: 2 weeks  1. Independent in HEP and progression per patient tolerance, in order to prevent re-injury. []? Progressing: []? Met: [x]? Not Met: []? Adjusted  2.  Patient will have a decrease in pain to facilitate improvement in movement, function, and ADLs as indicated by Functional Deficits. []? Progressing: []? Met: [x]? Not Met: []? Adjusted     Long Term Goals: To be achieved in: 8 weeks  1. Disability index score of 10% or less for the LEFS to assist with reaching prior level of function. []? Progressing: []? Met: [x]? Not Met: []? Adjusted  2. Patient will demonstrate increased AROM to full L knee to allow for proper joint functioning as indicated by patients Functional Deficits. []? Progressing: []? Met: [x]? Not Met: []? Adjusted  3. Patient will demonstrate an increase in strength to good proximal hip strength and control, within 5lb HHD in LE to allow for proper functional mobility as indicated by patients Functional Deficits. []? Progressing: []? Met: [x]? Not Met: []? Adjusted  4. Patient will return to daily functional activities without increased symptoms or restriction. []? Progressing: []? Met: [x]? Not Met: []? Adjusted  5. Pt will be able to ambulate without crutch and with full knee extension and quad recruitment without increased symptoms or restrictions. []? Progressing: []? Met: [x]? Not Met: []? Adjusted      ASSESSMENT:  Patient had L knee scope and scar tissue debridement on 3/14/22 at home in [facility]. Pt has significant soft tissue restriction through medial > lateral gastroc, mobility restrictions through quad tendon, patellar tendon, patellar mobility and tib fem mobility. Improvement in ROM today as well as improved quad set with superior patellar glide. Patient will continue to benefit from skilled PT to address L knee ROM/joint mobility, quad activation/strength, stability, balance, gait, and functional deficits to enable full, safe return to PLOF.      Return to Play: (if applicable)   [x]  Stage 1: Intro to Strength   []  Stage 2: Return to Run and Strength   []  Stage 3: Return to Jump and Strength   []  Stage 4: Dynamic Strength and Agility   []  Stage 5: Sport Specific Training     []  Ready to Return to Play, Meets All Above Stages   []  Not Ready for Return to Sports   Comments:            Treatment/Activity Tolerance:  [x] Patient tolerated treatment well [] Patient limited by fatique  [] Patient limited by pain  [] Patient limited by other medical complications  [] Other:     Overall Progression Towards Functional goals/ Treatment Progress Update:  [] Patient is progressing as expected towards functional goals listed. [x] Progression is slowed due to complexities/Impairments listed. [] Progression has been slowed due to co-morbidities. [] Plan just implemented, too soon to assess goals progression <30days   [] Goals require adjustment due to lack of progress  [] Patient is not progressing as expected and requires additional follow up with physician  [] Other    Prognosis for POC: [x] Good [] Fair  [] Poor    Patient requires continued skilled intervention: [x] Yes  [] No        PLAN: Increase frequency 3x/week w/ focus on manual therapy for restoring ROM. [x] Continue per plan of care [] Alter current plan (see comments)  [] Plan of care initiated [] Hold pending MD visit [] Discharge    Electronically signed by: Cara Sheridan PTA    Note: If patient does not return for scheduled/recommended follow up visits, this note will serve as a discharge from care along with the most recent update on progress.

## 2022-04-04 ENCOUNTER — HOSPITAL ENCOUNTER (OUTPATIENT)
Dept: PHYSICAL THERAPY | Age: 22
Setting detail: THERAPIES SERIES
Discharge: HOME | End: 2022-04-04
Payer: COMMERCIAL

## 2022-04-04 PROCEDURE — 97110 THERAPEUTIC EXERCISES: CPT

## 2022-04-04 PROCEDURE — 97016 VASOPNEUMATIC DEVICE THERAPY: CPT

## 2022-04-04 PROCEDURE — 97140 MANUAL THERAPY 1/> REGIONS: CPT

## 2022-04-04 NOTE — FLOWSHEET NOTE
[place] [address] [facility]  Phone: [phone] Fax: [phone]      Physical Therapy Treatment Note/ Progress Report:     Date:  2022    Patient Name:  Candace Brennan    :  2000  MRN: [id number]  Restrictions/Precautions:    Medical/Treatment Diagnosis Information:  · Diagnosis: Z48.89 encounter for other specified surgical aftercare  · Treatment Diagnosis: M25.562, M25.662, M62.559, R53.1, B42.2  Insurance/Certification information:  PT Insurance Information: BCBS; DED NOT MET; DN NOT COVERED; 80/20%; 60 VISITS/YEAR, 7 USED  Physician Information:  Referring Practitioner: Charla Sutton  Plan of care signed (Y/N):     Date of Patient follow up with Physician:      Progress Report: []  Yes  [x]  No     Date Range for reporting period:  Beginnin2022  Ending:      Progress report due (10 Rx/or 30 days whichever is less):     Recertification due (POC duration/ or 90 days whichever is less): 22     Visit # Insurance Allowable Auth Needed   20 08 ( 7 used) []Yes   []No     Latex Allergy:  [x]NO      []YES  Preferred Language for Healthcare:   [x]English       []other:  Functional Scale: LEFS: 33/80  Date assessed: 2022    Pain level:  0-4/10     SUBJECTIVE:  Patient reports that he worked on extension ROM prior to PT this AM. Seems like his L knee is a little more swollen today for some reason. Feels like he is walking better than he was.      OBJECTIVE:    Observation: ambulating w/ significant limitation in TKE at heel strike   Test measurements:      :  beginning of session  :   beginning of session --> 7-115 end of session  :   beginning of session -->4-115 end of session  :   beginning of session --> 5-115 end of session   :   beginning of session --> 2-118 end of session  :   beginning of session --> 2-120 end of session  :  8-110 beginning of session --> 4-120  end of session  3/2:   beginning of session -->3-120 end of session    3/8:   beginning of session --> 5-120 at end of session    3/28/22: 2 weeks post-op L knee scope: - beginning of session --> -3-110 end of session  Gait:  Ambulating without AD, but has significant limitation in terminal knee extension during stance phase on the L making gait mechanics antalgic during L stance. 3/30/22: -8-110 end of session      RESTRICTIONS/PRECAUTIONS: s/p L ACLr w/ patellar tendon graft,  DOS: 12/17/2021 L knee scope with scar tissue debridement on 3/14/22. Exercises/Interventions:     Therapeutic Ex (91473)  Sets/sec Reps Notes/CUES   Retro Stepper/BIKE 5 min     LAQ 20 deg ext lag   Seated bag hang - 5# ankle weight  5 min For L knee ext; HEP   Prone leg hang - 1# ankle weight  3 min For L knee ext   Incline calf stretch 30's 5    Seated hamstring stretch w/self OP 30s 5    Quad set 10s 10 Performed with NMES. See below. Seated heel slides    Prone TKEs 10s 10    CC TKE 5s 2x10 3 pl   SLR flexion 1 10 Cues to initiate with QS   Leg press isos    Mod. BOSU lunge isos    Gravity-assisted knee flexion on wall 10s 10                Manual Intervention (50664)      Knee mobs/PROM  4 min End range ext focus   Tib/Fem Mobs  6 min Posteromedial, posterolateral   Patella Mobs  6 min Restricted superiorly/inferiorly   STM  6 min Posterior chain in prone leg hang         NMR re-education (84339)   CUES NEEDED   [place]/Biofeedback 10/10   Quad set; 2 channel co contract   Sport cord march - yellow  2 10 Fwd/bwd; each direction   BFR      G. Med activation      Hip Ext full ROM/ G.  Activation      Bosu Bal and Prop- G Med      Single leg stance/Balance/Prop      Bosu Retro G. Med act      Prone Hip froggers- sliders/elevated            Therapeutic Activity (80964)      Ladders      Plyos      Dynamic Balance                  DN + estim   L hamstring         Access Code: [email or web address]  URL: [email or web address]  Date: 03/28/2022  Prepared by: Xi Gonzales    Exercises  Prone Knee Extension Hang - 2-3 x daily - 7 x weekly - 1 sets - 5-10 min hold  Seated Knee Extension Stretch with Chair - 2-3 x daily - 7 x weekly - 1 sets - 5-10 min hold  Seated Table Hamstring Stretch - 2-3 x daily - 7 x weekly - 1 sets - 5 reps - 20s-30s hold  Long Sitting Quad Set - 2-3 x daily - 7 x weekly - 3 sets - 10 reps  Prone Terminal Knee Extension - 2-3 x daily - 7 x weekly - 1-2 sets - 10 reps - 10s hold  Supine Knee Flexion Wall Slide - 2-3 x daily - 7 x weekly - 1 sets - 10 reps - 10s hold      Therapeutic Exercise and NMR EXR  [x] (38347) Provided verbal/tactile cueing for activities related to strengthening, flexibility, endurance, ROM for improvements in LE, proximal hip, and core control with self care, mobility, lifting, ambulation. [x] (91727) Provided verbal/tactile cueing for activities related to improving balance, coordination, kinesthetic sense, posture, motor skill, proprioception  to assist with LE, proximal hip, and core control in self care, mobility, lifting, ambulation and eccentric single leg control.      NMR and Therapeutic Activities:    [x] (04488 or 91795) Provided verbal/tactile cueing for activities related to improving balance, coordination, kinesthetic sense, posture, motor skill, proprioception and motor activation to allow for proper function of core, proximal hip and LE with self care and ADLs and functional mobility.   [] (58460) Gait Re-education- Provided training and instruction to the patient for proper LE, core and proximal hip recruitment and positioning and eccentric body weight control with ambulation re-education including up and down stairs     Home Exercise Program:    [x] (81660) Reviewed/Progressed HEP activities related to strengthening, flexibility, endurance, ROM of core, proximal hip and LE for functional self-care, mobility, lifting and ambulation/stair navigation   [] (40352)Reviewed/Progressed HEP activities related to improving balance, coordination, kinesthetic sense, posture, motor skill, proprioception of core, proximal hip and LE for self care, mobility, lifting, and ambulation/stair navigation      Manual Treatments:  PROM / STM / Oscillations-Mobs:  G-I, II, III, IV (PA's, Inf., Post.)  [x] (67482) Provided manual therapy to mobilize LE, proximal hip and/or LS spine soft tissue/joints for the purpose of modulating pain, promoting relaxation,  increasing ROM, reducing/eliminating soft tissue swelling/inflammation/restriction, improving soft tissue extensibility and allowing for proper ROM for normal function with self care, mobility, lifting and ambulation.             Modalities:     [x] GAME READY (VASO)- for significant edema, swelling, pain control. - 15 minutes    Charges:  Timed Code Treatment Minutes: 50   Total Treatment Minutes: 60      [] EVAL (LOW) 19761 (typically 20 minutes face-to-face)  [] EVAL (MOD) 63185 (typically 30 minutes face-to-face)  [] EVAL (HIGH) 67781 (typically 45 minutes face-to-face)  [] RE-EVAL     [x] SH(88881) x 2    [] DRY NEEDLE 1 OR 2 MUSCLES  [] NMR (65687) x     [] DRY NEEDLE 3+ MUSCLES  [x] Manual (86910) x 1     [] TA (95548) x     [] Mech Traction (10825)  [] ES(attended) (78449)     [] ES (un) (14907):   [x] VASO (76465)  [] Other:      GOALS:  Patient stated goal: \"get my leg straight and be able to walk without crutch\"  []? Progressing: []? Met: [x]? Not Met: []? Adjusted     Therapist goals for Patient:   Short Term Goals: To be achieved in: 2 weeks  1. Independent in HEP and progression per patient tolerance, in order to prevent re-injury. []? Progressing: []? Met: [x]? Not Met: []? Adjusted  2. Patient will have a decrease in pain to facilitate improvement in movement, function, and ADLs as indicated by Functional Deficits. []? Progressing: []? Met: [x]? Not Met: []?  Adjusted     Long Term Goals: To be achieved in: 8 weeks  1. Disability index score of 10% or less for the LEFS to assist with reaching prior level of function. []? Progressing: []? Met: [x]? Not Met: []? Adjusted  2. Patient will demonstrate increased AROM to full L knee to allow for proper joint functioning as indicated by patients Functional Deficits. []? Progressing: []? Met: [x]? Not Met: []? Adjusted  3. Patient will demonstrate an increase in strength to good proximal hip strength and control, within 5lb HHD in LE to allow for proper functional mobility as indicated by patients Functional Deficits. []? Progressing: []? Met: [x]? Not Met: []? Adjusted  4. Patient will return to daily functional activities without increased symptoms or restriction. []? Progressing: []? Met: [x]? Not Met: []? Adjusted  5. Pt will be able to ambulate without crutch and with full knee extension and quad recruitment without increased symptoms or restrictions. []? Progressing: []? Met: [x]? Not Met: []? Adjusted      ASSESSMENT:  3 weeks s/p L knee scope and scar tissue debridement. Continued manual focus to address patellar mobility, tib fem mobility and soft tissue restrictions through gastroc, quad tendon, and patellar tendon to improve L knee flexion and extension ROM limitations followed by quad activation/strength tasks to decrease quad inhibition and improve quad firing pattern. Quad activation/firing is improving, but needs continued work.     Return to Play: (if applicable)   [x]  Stage 1: Intro to Strength   []  Stage 2: Return to Run and Strength   []  Stage 3: Return to Jump and Strength   []  Stage 4: Dynamic Strength and Agility   []  Stage 5: Sport Specific Training     []  Ready to Return to Play, Meets All Above Stages   []  Not Ready for Return to Sports   Comments:            Treatment/Activity Tolerance:  [x] Patient tolerated treatment well [] Patient limited by fatique  [] Patient limited by pain  [] Patient limited by other medical complications  [] Other:     Overall Progression Towards Functional goals/ Treatment Progress Update:  [] Patient is progressing as expected towards functional goals listed. [x] Progression is slowed due to complexities/Impairments listed. [] Progression has been slowed due to co-morbidities. [] Plan just implemented, too soon to assess goals progression <30days   [] Goals require adjustment due to lack of progress  [] Patient is not progressing as expected and requires additional follow up with physician  [] Other    Prognosis for POC: [x] Good [] Fair  [] Poor    Patient requires continued skilled intervention: [x] Yes  [] No        PLAN: Increase frequency 3x/week w/ focus on manual therapy for restoring ROM. [x] Continue per plan of care [] Alter current plan (see comments)  [] Plan of care initiated [] Hold pending MD visit [] Discharge    Electronically signed by: Xavier Hernandez, PT, DPT, MS, SCS    Note: If patient does not return for scheduled/recommended follow up visits, this note will serve as a discharge from care along with the most recent update on progress.

## 2022-04-06 ENCOUNTER — HOSPITAL ENCOUNTER (OUTPATIENT)
Dept: PHYSICAL THERAPY | Age: 22
Setting detail: THERAPIES SERIES
Discharge: HOME | End: 2022-04-06
Payer: COMMERCIAL

## 2022-04-06 PROCEDURE — 97140 MANUAL THERAPY 1/> REGIONS: CPT

## 2022-04-06 PROCEDURE — 97110 THERAPEUTIC EXERCISES: CPT

## 2022-04-06 PROCEDURE — 97016 VASOPNEUMATIC DEVICE THERAPY: CPT

## 2022-04-06 NOTE — FLOWSHEET NOTE
[place] [address] [facility]  Phone: [phone] Fax: [phone]      Physical Therapy Treatment Note/ Progress Report:     Date:  2022    Patient Name:  Zbigniew Quiles    :  2000  MRN: [id number]  Restrictions/Precautions:    Medical/Treatment Diagnosis Information:  · Diagnosis: Z48.89 encounter for other specified surgical aftercare  · Treatment Diagnosis: M25.562, M25.662, M62.559, R53.1, D27.3  Insurance/Certification information:  PT Insurance Information: BCBS; DED NOT MET; DN NOT COVERED; 80/20%; 60 VISITS/YEAR, 7 USED  Physician Information:  Referring Practitioner: Liam Edu of care signed (Y/N):     Date of Patient follow up with Physician:      Progress Report: []  Yes  [x]  No     Date Range for reporting period:  Beginnin2022  Ending:      Progress report due (10 Rx/or 30 days whichever is less): 92    Recertification due (POC duration/ or 90 days whichever is less): 22     Visit # Insurance Allowable Auth Needed   81 47 ( 7 used) []Yes   []No     Latex Allergy:  [x]NO      []YES  Preferred Language for Healthcare:   [x]English       []other:  Functional Scale: LEFS: 33/80  Date assessed: 2022    Pain level:  0-4/10     SUBJECTIVE:  Patient states back of the knee was painful and tight yesterday but feeling a little better today. Feels like he is using his quad better when walking.       OBJECTIVE:    Observation: ambulating w/ significant limitation in TKE at heel strike   Test measurements:      :  beginning of session  :   beginning of session --> 7-115 end of session  :   beginning of session -->4-115 end of session  :   beginning of session --> 5-115 end of session   :   beginning of session --> 2-118 end of session  :   beginning of session --> 2-120 end of session  :  8-110 beginning of session --> 4-120 end of session  3/2:   beginning of session -->3-120 end of session    3/8:   beginning of session --> 5-120 at end of session    3/28/22: 2 weeks post-op L knee scope: - beginning of session --> -3-110 end of session  Gait:  Ambulating without AD, but has significant limitation in terminal knee extension during stance phase on the L making gait mechanics antalgic during L stance. 3/30/22: -8-110 end of session  4/6/2022: -8-121      RESTRICTIONS/PRECAUTIONS: s/p L ACLr w/ patellar tendon graft,  DOS: 12/17/2021 L knee scope with scar tissue debridement on 3/14/22. Exercises/Interventions:     Therapeutic Ex (38852)  Sets/sec Reps Notes/CUES   Retro Stepper/BIKE 5 min     LAQ 20 deg ext lag   Seated bag hang - 5# ankle weight  5 min For L knee ext; HEP   Prone leg hang - 1# ankle weight  3 min For L knee ext   Incline calf stretch 30's 5    Seated hamstring stretch w/self OP 30s 5    Quad set 10s 10 Performed with NMES. See below. Seated heel slides    Prone TKEs 10s 10    CC TKE 5s 2x10 3 pl   SLR flexion 1 10 Cues to initiate with QS   Leg press isos    Mod. BOSU lunge isos    Gravity-assisted knee flexion on wall 10s 10                Manual Intervention (78946)      Knee mobs/PROM  4 min End range ext focus   Tib/Fem Mobs  8 min Posteromedial, posterolateral; prone tibial for end range flexion   Patella Mobs  6 min Restricted superiorly/inferiorly   STM  6 min Posterior chain in prone leg hang         NMR re-education (95298)   CUES NEEDED   [place]/Biofeedback 10/10   Quad set; 2 channel co contract   Sport cord march - yellow  2 10 Fwd/bwd; each direction   BFR      G. Med activation      Hip Ext full ROM/ G.  Activation      Bosu Bal and Prop- G Med      Single leg stance/Balance/Prop      Bosu Retro G. Med act      Prone Hip froggers- sliders/elevated            Therapeutic Activity (03744)      Ladders      Plyos      Dynamic Balance                  DN + estim   L hamstring Access Code: [email or web address]  URL: [email or web address]  Date: 03/28/2022  Prepared by: Yoseph Peralta    Exercises  Prone Knee Extension Hang - 2-3 x daily - 7 x weekly - 1 sets - 5-10 min hold  Seated Knee Extension Stretch with Chair - 2-3 x daily - 7 x weekly - 1 sets - 5-10 min hold  Seated Table Hamstring Stretch - 2-3 x daily - 7 x weekly - 1 sets - 5 reps - 20s-30s hold  Long Sitting Quad Set - 2-3 x daily - 7 x weekly - 3 sets - 10 reps  Prone Terminal Knee Extension - 2-3 x daily - 7 x weekly - 1-2 sets - 10 reps - 10s hold  Supine Knee Flexion Wall Slide - 2-3 x daily - 7 x weekly - 1 sets - 10 reps - 10s hold      Therapeutic Exercise and NMR EXR  [x] (61286) Provided verbal/tactile cueing for activities related to strengthening, flexibility, endurance, ROM for improvements in LE, proximal hip, and core control with self care, mobility, lifting, ambulation. [x] (83959) Provided verbal/tactile cueing for activities related to improving balance, coordination, kinesthetic sense, posture, motor skill, proprioception  to assist with LE, proximal hip, and core control in self care, mobility, lifting, ambulation and eccentric single leg control.      NMR and Therapeutic Activities:    [x] (11056 or 02765) Provided verbal/tactile cueing for activities related to improving balance, coordination, kinesthetic sense, posture, motor skill, proprioception and motor activation to allow for proper function of core, proximal hip and LE with self care and ADLs and functional mobility.   [] (07744) Gait Re-education- Provided training and instruction to the patient for proper LE, core and proximal hip recruitment and positioning and eccentric body weight control with ambulation re-education including up and down stairs     Home Exercise Program:    [x] (15767) Reviewed/Progressed HEP activities related to strengthening, flexibility, endurance, ROM of core, proximal hip and LE for functional self-care, mobility, lifting and ambulation/stair navigation   [] (30785)Reviewed/Progressed HEP activities related to improving balance, coordination, kinesthetic sense, posture, motor skill, proprioception of core, proximal hip and LE for self care, mobility, lifting, and ambulation/stair navigation      Manual Treatments:  PROM / STM / Oscillations-Mobs:  G-I, II, III, IV (PA's, Inf., Post.)  [x] (97182) Provided manual therapy to mobilize LE, proximal hip and/or LS spine soft tissue/joints for the purpose of modulating pain, promoting relaxation,  increasing ROM, reducing/eliminating soft tissue swelling/inflammation/restriction, improving soft tissue extensibility and allowing for proper ROM for normal function with self care, mobility, lifting and ambulation.             Modalities:     [x] GAME READY (VASO)- for significant edema, swelling, pain control. - 15 minutes    Charges:  Timed Code Treatment Minutes: 50   Total Treatment Minutes: 60      [] EVAL (LOW) 23774 (typically 20 minutes face-to-face)  [] EVAL (MOD) 99114 (typically 30 minutes face-to-face)  [] EVAL (HIGH) 94246 (typically 45 minutes face-to-face)  [] RE-EVAL     [x] SG(54917) x 2    [] DRY NEEDLE 1 OR 2 MUSCLES  [] NMR (26687) x     [] DRY NEEDLE 3+ MUSCLES  [x] Manual (10755) x 1     [] TA (21087) x     [] Mech Traction (05372)  [] ES(attended) (04311)     [] ES (un) (93591):   [x] VASO (50808)  [] Other:      GOALS:  Patient stated goal: \"get my leg straight and be able to walk without crutch\"  []? Progressing: []? Met: [x]? Not Met: []? Adjusted     Therapist goals for Patient:   Short Term Goals: To be achieved in: 2 weeks  1. Independent in HEP and progression per patient tolerance, in order to prevent re-injury. []? Progressing: []? Met: [x]? Not Met: []? Adjusted  2. Patient will have a decrease in pain to facilitate improvement in movement, function, and ADLs as indicated by Functional Deficits. []? Progressing: []? Met: [x]? Not Met: []?  Adjusted     Long Term Goals: To be achieved in: 8 weeks  1. Disability index score of 10% or less for the LEFS to assist with reaching prior level of function. []? Progressing: []? Met: [x]? Not Met: []? Adjusted  2. Patient will demonstrate increased AROM to full L knee to allow for proper joint functioning as indicated by patients Functional Deficits. []? Progressing: []? Met: [x]? Not Met: []? Adjusted  3. Patient will demonstrate an increase in strength to good proximal hip strength and control, within 5lb HHD in LE to allow for proper functional mobility as indicated by patients Functional Deficits. []? Progressing: []? Met: [x]? Not Met: []? Adjusted  4. Patient will return to daily functional activities without increased symptoms or restriction. []? Progressing: []? Met: [x]? Not Met: []? Adjusted  5. Pt will be able to ambulate without crutch and with full knee extension and quad recruitment without increased symptoms or restrictions. []? Progressing: []? Met: [x]? Not Met: []? Adjusted      ASSESSMENT:  3+ weeks s/p L knee scope and scar tissue debridement. Continued manual focus to address patellar mobility, tib fem mobility and soft tissue restrictions through gastroc, quad tendon, and patellar tendon to improve L knee flexion and extension ROM limitations followed by quad activation/strength tasks to decrease quad inhibition and improve quad firing pattern. Had 11 degrees improvement in knee flexion following prone stretching and mobilization. Quad activation/firing is improving, but needs continued work. Functional mobility and ROM limitations in posterior knee today, flexion improving w/ manual but HS guarding remains.     Return to Play: (if applicable)   [x]  Stage 1: Intro to Strength   []  Stage 2: Return to Run and Strength   []  Stage 3: Return to Jump and Strength   []  Stage 4: Dynamic Strength and Agility   []  Stage 5: Sport Specific Training     []  Ready to Return to Play, Meets All Above Stages   []  Not Ready for Return to Sports   Comments:            Treatment/Activity Tolerance:  [x] Patient tolerated treatment well [] Patient limited by fatique  [] Patient limited by pain  [] Patient limited by other medical complications  [] Other:     Overall Progression Towards Functional goals/ Treatment Progress Update:  [] Patient is progressing as expected towards functional goals listed. [x] Progression is slowed due to complexities/Impairments listed. [] Progression has been slowed due to co-morbidities. [] Plan just implemented, too soon to assess goals progression <30days   [] Goals require adjustment due to lack of progress  [] Patient is not progressing as expected and requires additional follow up with physician  [] Other    Prognosis for POC: [x] Good [] Fair  [] Poor    Patient requires continued skilled intervention: [x] Yes  [] No        PLAN: Increase frequency 3x/week w/ focus on manual therapy for restoring ROM. [x] Continue per plan of care [] Alter current plan (see comments)  [] Plan of care initiated [] Hold pending MD visit [] Discharge    Electronically signed by: Dru Tellez, PT, DPT    Note: If patient does not return for scheduled/recommended follow up visits, this note will serve as a discharge from care along with the most recent update on progress.

## 2022-04-08 ENCOUNTER — HOSPITAL ENCOUNTER (OUTPATIENT)
Dept: PHYSICAL THERAPY | Age: 22
Setting detail: THERAPIES SERIES
Discharge: HOME | End: 2022-04-08
Payer: COMMERCIAL

## 2022-04-08 PROCEDURE — 97140 MANUAL THERAPY 1/> REGIONS: CPT

## 2022-04-08 PROCEDURE — 97110 THERAPEUTIC EXERCISES: CPT

## 2022-04-08 NOTE — FLOWSHEET NOTE
[address] [place], [place] [address]  Phone: [phone] Fax: [phone]      Physical Therapy Treatment Note/ Progress Report:     Date:  2022    Patient Name:  Cheryl Banks    :  2000  MRN: [id number]  Restrictions/Precautions:    Medical/Treatment Diagnosis Information:  · Diagnosis: Z48.89 encounter for other specified surgical aftercare  · Treatment Diagnosis: M25.562, M25.662, M62.559, R53.1, B78.6  Insurance/Certification information:  PT Insurance Information: BCBS; DED NOT MET; DN NOT COVERED; 80/20%; 60 VISITS/YEAR, 7 USED  Physician Information:  Referring Practitioner: Carine Royal of care signed (Y/N):     Date of Patient follow up with Physician:      Progress Report: []  Yes  [x]  No     Date Range for reporting period:  Beginnin2022  Ending:      Progress report due (10 Rx/or 30 days whichever is less):     Recertification due (POC duration/ or 90 days whichever is less): 22     Visit # Insurance Allowable Auth Needed   36 08 ( 7 used) []Yes   []No     Latex Allergy:  [x]NO      []YES  Preferred Language for Healthcare:   [x]English       []other:  Functional Scale: LEFS: 33/80  Date assessed: 2022    Pain level:  0-4/10     SUBJECTIVE:  Patient states that he had some soreness after last visit, but doing ok now. Pt states that his knee is stiff because he drove 40min and back from [place] to [place]. OBJECTIVE: Pt arrived more than 20 min late for session today.    Observation: ambulating w/ significant limitation in TKE at heel strike   Test measurements:      :  beginning of session  :   beginning of session --> 7-115 end of session  :   beginning of session -->4-115 end of session  :   beginning of session --> 5-115 end of session   :   beginning of session --> 2-118 end of session  :   beginning of session --> 2-120 end of session  2/28:  8-110 beginning of session --> 4-120  end of session  3/2:   beginning of session -->3-120 end of session    3/8:   beginning of session --> 5-120 at end of session       3/28/22: 2 weeks post-op L knee scope: - beginning of session --> -3-110 end of session  Gait:  Ambulating without AD, but has significant limitation in terminal knee extension during stance phase on the L making gait mechanics antalgic during L stance. 3/30/22: -8-110 end of session  4/6/2022: -8-121  4/8/2022: -      RESTRICTIONS/PRECAUTIONS: s/p L ACLr w/ patellar tendon graft,  DOS: 12/17/2021 L knee scope with scar tissue debridement on 3/14/22. Exercises/Interventions:     Therapeutic Ex (90997)  Sets/sec Reps Notes/CUES   Retro Stepper/BIKE 5 min     LAQ 20 deg ext lag   Seated bag hang - 5# ankle weight  5 min For L knee ext; HEP   Prone leg hang - 4# ankle weight  4 min For L knee ext   Incline calf stretch 30's 5    Seated hamstring stretch w/self OP 30s 5 reviewed   Quad set 10s 10 Performed with NMES. See below. Seated heel slides    Prone TKEs 10s 10    CC TKE 5s 2x10 3 pl   SLR flexion 1 10 Cues to initiate with QS   Leg press isos    Mod. BOSU lunge isos    Gravity-assisted knee flexion on wall 10s 10                Manual Intervention (02424)      Knee mobs/PROM  8 min End range ext focus   Tib/Fem Mobs  10 min Posteromedial, posterolateral; prone tibial for end range flexion   Patella Mobs  6 min Restricted superiorly/inferiorly   STM  8 min Incisions, patella framing, hamstring         NMR re-education (98126)   CUES NEEDED   Serbian/Biofeedback 10/10   Quad set; 2 channel co contract   Sport cord march - yellow  2 10 Fwd/bwd; each direction   BFR      G. Med activation      Hip Ext full ROM/ G.  Activation      Bosu Bal and Prop- G Med      Single leg stance/Balance/Prop      Bosu Retro G. Med act      Prone Hip froggers- sliders/elevated Therapeutic Activity (24216)      feedPack      Dynamic Balance                  DN + estim   L hamstring         Access Code: [email or web address]  URL: [email or web address]  Date: 03/28/2022  Prepared by: Jazmine Davenport    Exercises  Prone Knee Extension Hang - 2-3 x daily - 7 x weekly - 1 sets - 5-10 min hold  Seated Knee Extension Stretch with Chair - 2-3 x daily - 7 x weekly - 1 sets - 5-10 min hold  Seated Table Hamstring Stretch - 2-3 x daily - 7 x weekly - 1 sets - 5 reps - 20s-30s hold  Long Sitting Quad Set - 2-3 x daily - 7 x weekly - 3 sets - 10 reps  Prone Terminal Knee Extension - 2-3 x daily - 7 x weekly - 1-2 sets - 10 reps - 10s hold  Supine Knee Flexion Wall Slide - 2-3 x daily - 7 x weekly - 1 sets - 10 reps - 10s hold      Therapeutic Exercise and NMR EXR  [x] (76226) Provided verbal/tactile cueing for activities related to strengthening, flexibility, endurance, ROM for improvements in LE, proximal hip, and core control with self care, mobility, lifting, ambulation. [x] (47457) Provided verbal/tactile cueing for activities related to improving balance, coordination, kinesthetic sense, posture, motor skill, proprioception  to assist with LE, proximal hip, and core control in self care, mobility, lifting, ambulation and eccentric single leg control.      NMR and Therapeutic Activities:    [x] (93034 or 28827) Provided verbal/tactile cueing for activities related to improving balance, coordination, kinesthetic sense, posture, motor skill, proprioception and motor activation to allow for proper function of core, proximal hip and LE with self care and ADLs and functional mobility.   [] (72930) Gait Re-education- Provided training and instruction to the patient for proper LE, core and proximal hip recruitment and positioning and eccentric body weight control with ambulation re-education including up and down stairs     Home Exercise Program:    [x] (69139) Reviewed/Progressed HEP activities related to strengthening, flexibility, endurance, ROM of core, proximal hip and LE for functional self-care, mobility, lifting and ambulation/stair navigation   [] (77550)Reviewed/Progressed HEP activities related to improving balance, coordination, kinesthetic sense, posture, motor skill, proprioception of core, proximal hip and LE for self care, mobility, lifting, and ambulation/stair navigation      Manual Treatments:  PROM / STM / Oscillations-Mobs:  G-I, II, III, IV (PA's, Inf., Post.)  [x] (89231) Provided manual therapy to mobilize LE, proximal hip and/or LS spine soft tissue/joints for the purpose of modulating pain, promoting relaxation,  increasing ROM, reducing/eliminating soft tissue swelling/inflammation/restriction, improving soft tissue extensibility and allowing for proper ROM for normal function with self care, mobility, lifting and ambulation.             Modalities:     [] GAME READY (VASO)- for significant edema, swelling, pain control. - 15 minutes    Charges:  Timed Code Treatment Minutes: 40   Total Treatment Minutes: 45      [] EVAL (LOW) 89538 (typically 20 minutes face-to-face)  [] EVAL (MOD) 88983 (typically 30 minutes face-to-face)  [] EVAL (HIGH) 16851 (typically 45 minutes face-to-face)  [] RE-EVAL     [x] FG(06685) x 1    [] DRY NEEDLE 1 OR 2 MUSCLES  [] NMR (90883) x     [] DRY NEEDLE 3+ MUSCLES  [x] Manual (13097) x 2     [] TA (35406) x     [] Mech Traction (66958)  [] ES(attended) (79356)     [] ES (un) (13284):   [] VASO (33367)  [] Other:      GOALS:  Patient stated goal: \"get my leg straight and be able to walk without crutch\"  []? Progressing: []? Met: [x]? Not Met: []? Adjusted     Therapist goals for Patient:   Short Term Goals: To be achieved in: 2 weeks  1. Independent in HEP and progression per patient tolerance, in order to prevent re-injury. []? Progressing: []? Met: [x]? Not Met: []? Adjusted  2.  Patient will have a decrease in pain to facilitate improvement in movement, function, and ADLs as indicated by Functional Deficits. []? Progressing: []? Met: [x]? Not Met: []? Adjusted     Long Term Goals: To be achieved in: 8 weeks  1. Disability index score of 10% or less for the LEFS to assist with reaching prior level of function. []? Progressing: []? Met: [x]? Not Met: []? Adjusted  2. Patient will demonstrate increased AROM to full L knee to allow for proper joint functioning as indicated by patients Functional Deficits. []? Progressing: []? Met: [x]? Not Met: []? Adjusted  3. Patient will demonstrate an increase in strength to good proximal hip strength and control, within 5lb HHD in LE to allow for proper functional mobility as indicated by patients Functional Deficits. []? Progressing: []? Met: [x]? Not Met: []? Adjusted  4. Patient will return to daily functional activities without increased symptoms or restriction. []? Progressing: []? Met: [x]? Not Met: []? Adjusted  5. Pt will be able to ambulate without crutch and with full knee extension and quad recruitment without increased symptoms or restrictions. []? Progressing: []? Met: [x]? Not Met: []? Adjusted      ASSESSMENT:  3+ weeks s/p L knee scope and scar tissue debridement. Continued manual focus to address patellar mobility, tib fem mobility and soft tissue restrictions through gastroc, quad tendon, patellar tendon and incisions to improve L knee flexion and extension ROM limitations followed by quad activation/strength tasks to decrease quad inhibition and improve quad firing pattern. Pt has poor quad use in gait and is having difficulty maintaining gains in extension ROM achieved during PT sessions.      Return to Play: (if applicable)   [x]  Stage 1: Intro to Strength   []  Stage 2: Return to Run and Strength   []  Stage 3: Return to Jump and Strength   []  Stage 4: Dynamic Strength and Agility   []  Stage 5: Sport Specific Training     []  Ready to Return to Play, Meets All Above Stages   []  Not Ready for Return to Sports   Comments:            Treatment/Activity Tolerance:  [x] Patient tolerated treatment well [] Patient limited by fatique  [] Patient limited by pain  [] Patient limited by other medical complications  [] Other:     Overall Progression Towards Functional goals/ Treatment Progress Update:  [] Patient is progressing as expected towards functional goals listed. [x] Progression is slowed due to complexities/Impairments listed. [] Progression has been slowed due to co-morbidities. [] Plan just implemented, too soon to assess goals progression <30days   [] Goals require adjustment due to lack of progress  [] Patient is not progressing as expected and requires additional follow up with physician  [] Other    Prognosis for POC: [x] Good [] Fair  [] Poor    Patient requires continued skilled intervention: [x] Yes  [] No        PLAN: Increase frequency 3x/week w/ focus on manual therapy for restoring ROM. [x] Continue per plan of care [] Alter current plan (see comments)  [] Plan of care initiated [] Hold pending MD visit [] Discharge    Electronically signed by: Gloria Polk PTA    Note: If patient does not return for scheduled/recommended follow up visits, this note will serve as a discharge from care along with the most recent update on progress.

## 2022-04-11 ENCOUNTER — HOSPITAL ENCOUNTER (OUTPATIENT)
Dept: PHYSICAL THERAPY | Age: 22
Setting detail: THERAPIES SERIES
Discharge: HOME | End: 2022-04-11
Payer: COMMERCIAL

## 2022-04-11 PROCEDURE — 97140 MANUAL THERAPY 1/> REGIONS: CPT

## 2022-04-11 PROCEDURE — 97112 NEUROMUSCULAR REEDUCATION: CPT

## 2022-04-11 PROCEDURE — 97110 THERAPEUTIC EXERCISES: CPT

## 2022-04-11 NOTE — FLOWSHEET NOTE
[place] [address] [facility]  Phone: [phone] Fax: [phone]      Physical Therapy Treatment Note/ Progress Report:     Date:  2022    Patient Name:  Haritha Moise    :  2000  MRN: [id number]  Restrictions/Precautions:    Medical/Treatment Diagnosis Information:  · Diagnosis: Z48.89 encounter for other specified surgical aftercare  · Treatment Diagnosis: M25.562, M25.662, M62.559, R53.1, L78.2  Insurance/Certification information:  PT Insurance Information: Farooq Martinez; DED NOT MET; DN NOT COVERED; 80/20%; 60 VISITS/YEAR, 7 USED  Physician Information:  Referring Practitioner: Terri Jordan of care signed (Y/N):     Date of Patient follow up with Physician:      Progress Report: []  Yes  [x]  No     Date Range for reporting period:  Beginnin2022  Ending:      Progress report due (10 Rx/or 30 days whichever is less):     Recertification due (POC duration/ or 90 days whichever is less): 22     Visit # Insurance Allowable Auth Needed   20 60 ( 7 used) []Yes   []No     Latex Allergy:  [x]NO      []YES  Preferred Language for Healthcare:   [x]English       []other:  Functional Scale: LEFS: 33/80  Date assessed: 2022    Pain level:  0-4/10     SUBJECTIVE:  Admits he did not do his exercises this weekend because he was dealing with some girl drama. Did say he ran up some stairs today. Was surprised at how easy it was to do that and how little discomfort was present in the L knee when he ran up the stairs. OBJECTIVE: Pt arrived 15 min late for session today.     Observation: ambulating w/ significant limitation in TKE at heel strike   Test measurements:      :  beginning of session  2:   beginning of session --> 7-115 end of session  :   beginning of session -->4-115 end of session  :   beginning of session --> 5-115 end of session   :   Activity (25332)      AMT      Dynamic Balance                  DN + estim   L hamstring         Access Code: [email or web address]  URL: [email or web address]  Date: 03/28/2022  Prepared by: Oral Leger    Exercises  Prone Knee Extension Hang - 2-3 x daily - 7 x weekly - 1 sets - 5-10 min hold  Seated Knee Extension Stretch with Chair - 2-3 x daily - 7 x weekly - 1 sets - 5-10 min hold  Seated Table Hamstring Stretch - 2-3 x daily - 7 x weekly - 1 sets - 5 reps - 20s-30s hold  Long Sitting Quad Set - 2-3 x daily - 7 x weekly - 3 sets - 10 reps  Prone Terminal Knee Extension - 2-3 x daily - 7 x weekly - 1-2 sets - 10 reps - 10s hold  Supine Knee Flexion Wall Slide - 2-3 x daily - 7 x weekly - 1 sets - 10 reps - 10s hold      Therapeutic Exercise and NMR EXR  [x] (34302) Provided verbal/tactile cueing for activities related to strengthening, flexibility, endurance, ROM for improvements in LE, proximal hip, and core control with self care, mobility, lifting, ambulation. [x] (75640) Provided verbal/tactile cueing for activities related to improving balance, coordination, kinesthetic sense, posture, motor skill, proprioception  to assist with LE, proximal hip, and core control in self care, mobility, lifting, ambulation and eccentric single leg control.      NMR and Therapeutic Activities:    [x] (07262 or 87148) Provided verbal/tactile cueing for activities related to improving balance, coordination, kinesthetic sense, posture, motor skill, proprioception and motor activation to allow for proper function of core, proximal hip and LE with self care and ADLs and functional mobility.   [] (45126) Gait Re-education- Provided training and instruction to the patient for proper LE, core and proximal hip recruitment and positioning and eccentric body weight control with ambulation re-education including up and down stairs     Home Exercise Program:    [x] (19173) Reviewed/Progressed HEP activities related to strengthening, flexibility, endurance, ROM of core, proximal hip and LE for functional self-care, mobility, lifting and ambulation/stair navigation   [] (49643)Reviewed/Progressed HEP activities related to improving balance, coordination, kinesthetic sense, posture, motor skill, proprioception of core, proximal hip and LE for self care, mobility, lifting, and ambulation/stair navigation      Manual Treatments:  PROM / STM / Oscillations-Mobs:  G-I, II, III, IV (PA's, Inf., Post.)  [x] (23753) Provided manual therapy to mobilize LE, proximal hip and/or LS spine soft tissue/joints for the purpose of modulating pain, promoting relaxation,  increasing ROM, reducing/eliminating soft tissue swelling/inflammation/restriction, improving soft tissue extensibility and allowing for proper ROM for normal function with self care, mobility, lifting and ambulation.             Modalities:     [] GAME READY (VASO)- for significant edema, swelling, pain control. - 15 minutes    Charges:  Timed Code Treatment Minutes: 55   Total Treatment Minutes: 55      [] EVAL (LOW) 22335 (typically 20 minutes face-to-face)  [] EVAL (MOD) 13854 (typically 30 minutes face-to-face)  [] EVAL (HIGH) 63970 (typically 45 minutes face-to-face)  [] RE-EVAL     [x] FP(44451) x 1    [] DRY NEEDLE 1 OR 2 MUSCLES  [x] NMR (93844) x 1    [] DRY NEEDLE 3+ MUSCLES  [x] Manual (50644) x 2     [] TA (68208) x     [] Mech Traction (09618)  [] ES(attended) (20172)     [] ES (un) (87308):   [] VASO (62697)  [] Other:      GOALS:  Patient stated goal: \"get my leg straight and be able to walk without crutch\"  []? Progressing: []? Met: [x]? Not Met: []? Adjusted     Therapist goals for Patient:   Short Term Goals: To be achieved in: 2 weeks  1. Independent in HEP and progression per patient tolerance, in order to prevent re-injury. []? Progressing: []? Met: [x]? Not Met: []? Adjusted  2.  Patient will have a decrease in pain to facilitate improvement in movement, function, and ADLs as indicated by Functional Deficits. []? Progressing: []? Met: [x]? Not Met: []? Adjusted     Long Term Goals: To be achieved in: 8 weeks  1. Disability index score of 10% or less for the LEFS to assist with reaching prior level of function. []? Progressing: []? Met: [x]? Not Met: []? Adjusted  2. Patient will demonstrate increased AROM to full L knee to allow for proper joint functioning as indicated by patients Functional Deficits. []? Progressing: []? Met: [x]? Not Met: []? Adjusted  3. Patient will demonstrate an increase in strength to good proximal hip strength and control, within 5lb HHD in LE to allow for proper functional mobility as indicated by patients Functional Deficits. []? Progressing: []? Met: [x]? Not Met: []? Adjusted  4. Patient will return to daily functional activities without increased symptoms or restriction. []? Progressing: []? Met: [x]? Not Met: []? Adjusted  5. Pt will be able to ambulate without crutch and with full knee extension and quad recruitment without increased symptoms or restrictions. []? Progressing: []? Met: [x]? Not Met: []? Adjusted      ASSESSMENT:  Continued manual focus to address patellar mobility, tib fem mobility and soft tissue restrictions through quad tendon, patellar tendon, hamstrings, and incisions to improve L knee flexion and extension ROM limitations. Noted palpable quarter-sized tissue mass in superomedial R knee at medial femoral condyle. Unsure whether this \"mass\" is scar tissue, pocket of swelling, or something else, however, the mass has palpable edges and firmness to the tissue and it is ttp unlike pockets of swelling. Plan to follow up with physician about this to determine if any care or follow up is warranted before scheduled appointment April 22nd. Continued exercise focus on quad activation/strength tasks to decrease quad inhibition and improve quad firing pattern particularly with stance phase of gait.      Return to Play: (if applicable)   [x]  Stage 1: Intro to Strength   []  Stage 2: Return to Run and Strength   []  Stage 3: Return to Jump and Strength   []  Stage 4: Dynamic Strength and Agility   []  Stage 5: Sport Specific Training     []  Ready to Return to Play, Meets All Above Stages   []  Not Ready for Return to Sports   Comments:            Treatment/Activity Tolerance:  [x] Patient tolerated treatment well [] Patient limited by fatique  [] Patient limited by pain  [] Patient limited by other medical complications  [] Other:     Overall Progression Towards Functional goals/ Treatment Progress Update:  [] Patient is progressing as expected towards functional goals listed. [x] Progression is slowed due to complexities/Impairments listed. [] Progression has been slowed due to co-morbidities. [] Plan just implemented, too soon to assess goals progression <30days   [] Goals require adjustment due to lack of progress  [] Patient is not progressing as expected and requires additional follow up with physician  [] Other    Prognosis for POC: [x] Good [] Fair  [] Poor    Patient requires continued skilled intervention: [x] Yes  [] No        PLAN: Increase frequency 3x/week w/ focus on manual therapy for restoring ROM. [x] Continue per plan of care [] Alter current plan (see comments)  [] Plan of care initiated [] Hold pending MD visit [] Discharge    Electronically signed by: Kathi Funk, PT, DPT, MS, SCS    Note: If patient does not return for scheduled/recommended follow up visits, this note will serve as a discharge from care along with the most recent update on progress.

## 2022-04-13 ENCOUNTER — HOSPITAL ENCOUNTER (OUTPATIENT)
Dept: PHYSICAL THERAPY | Age: 22
Setting detail: THERAPIES SERIES
Discharge: HOME | End: 2022-04-13
Payer: COMMERCIAL

## 2022-04-13 PROCEDURE — 97140 MANUAL THERAPY 1/> REGIONS: CPT

## 2022-04-13 PROCEDURE — 97112 NEUROMUSCULAR REEDUCATION: CPT

## 2022-04-13 PROCEDURE — 97110 THERAPEUTIC EXERCISES: CPT

## 2022-04-13 NOTE — FLOWSHEET NOTE
[place] [address] [facility]  Phone: [phone] Fax: [phone]      Physical Therapy Treatment Note/ Progress Report:     Date:  2022    Patient Name:  Adriano Hair    :  2000  MRN: [id number]  Restrictions/Precautions:    Medical/Treatment Diagnosis Information:  · Diagnosis: Z48.89 encounter for other specified surgical aftercare  · Treatment Diagnosis: M25.562, M25.662, M62.559, R53.1, J82.6  Insurance/Certification information:  PT Insurance Information: BCBS; DED NOT MET; DN NOT COVERED; 80/20%; 60 VISITS/YEAR, 7 USED  Physician Information:  Referring Practitioner: Torres Gutierrez of care signed (Y/N):     Date of Patient follow up with Physician:      Progress Report: []  Yes  [x]  No     Date Range for reporting period:  Beginnin2022  Ending:      Progress report due (10 Rx/or 30 days whichever is less):     Recertification due (POC duration/ or 90 days whichever is less): 22     Visit # Insurance Allowable Auth Needed   20 60 ( 7 used) []Yes   []No     Latex Allergy:  [x]NO      []YES  Preferred Language for Healthcare:   [x]English       []other:  Functional Scale: LEFS: 33/80  Date assessed: 2022    Pain level:  0-4/10     SUBJECTIVE: States he just did some of his exercises about an hour ago. Has been working on soft tissue spot on knee and has some improvement. Knee was sore after last session. OBJECTIVE: Pt arrived 15 min late for session today.     Observation: ambulating w/ significant limitation in TKE at heel strike   Test measurements:      :  beginning of session  :   beginning of session --> 7-115 end of session  :   beginning of session -->4-115 end of session  :   beginning of session --> 5-115 end of session   :   beginning of session --> 2-118 end of session  :   beginning of session --> 2-120 end of session  2/28:  8-110 beginning of session --> 4-120  end of session  3/2:   beginning of session -->3-120 end of session    3/8:   beginning of session --> 5-120 at end of session       3/28/22: 2 weeks post-op L knee scope: - beginning of session --> -3-110 end of session  Gait:  Ambulating without AD, but has significant limitation in terminal knee extension during stance phase on the L making gait mechanics antalgic during L stance. 3/30/22: -8-110 end of session  4/6/2022: -8-121  4/8/2022: -  4/13/2022: -3-120      RESTRICTIONS/PRECAUTIONS: s/p L ACLr w/ patellar tendon graft,  DOS: 12/17/2021 L knee scope with scar tissue debridement on 3/14/22. Exercises/Interventions:     Therapeutic Ex (96533)  Sets/sec Reps Notes/CUES   Retro Stepper/BIKE 5 min     LAQ 20 deg ext lag   Seated bag hang - 5# ankle weight  5 min For L knee ext; HEP   Prone leg hang - 4# ankle weight  4 min For L knee ext   Incline calf stretch 30s 5    Seated hamstring stretch w/self OP 30s 5 reviewed   Quad set    Seated heel slides    Prone TKEs    CC TKE 5s 2x10 Performed with biofeedback. See below. SLR flexion 1 10 Cues to initiate with QS   Leg press - SL - 40# 2 10    Mod.  BOSU lunge isos    Gravity-assisted knee flexion on wall 10s 10                Manual Intervention (27363)      Knee mobs/PROM  8 min End range ext/flexion w/OP   Tib/Fem Mobs  8 min Posteromedial, posterolateral; prone tibial for end range flexion   Patella Mobs  6 min Restricted superiorly/inferiorly   IASTM/STM  8 min Incisions, patella framing, hamstring         NMR Re-education (22713)   CUES NEEDED   Biofeedback w/CC TKE - 2 pl  8 min 10/10; 24 mA stim; 50 mA target   Sport cord march - yellow  2 10 Fwd/bwd; each direction   SLS balance - SC around back of knee cueing TKE - yellow 20s 5                                              Therapeutic Activity (51002)      Ladders      Plyos      Dynamic Balance DN + estim   L hamstring         Access Code: [email or web address]  URL: [email or web address]  Date: 03/28/2022  Prepared by: Shawnee Rings    Exercises  Prone Knee Extension Hang - 2-3 x daily - 7 x weekly - 1 sets - 5-10 min hold  Seated Knee Extension Stretch with Chair - 2-3 x daily - 7 x weekly - 1 sets - 5-10 min hold  Seated Table Hamstring Stretch - 2-3 x daily - 7 x weekly - 1 sets - 5 reps - 20s-30s hold  Long Sitting Quad Set - 2-3 x daily - 7 x weekly - 3 sets - 10 reps  Prone Terminal Knee Extension - 2-3 x daily - 7 x weekly - 1-2 sets - 10 reps - 10s hold  Supine Knee Flexion Wall Slide - 2-3 x daily - 7 x weekly - 1 sets - 10 reps - 10s hold      Therapeutic Exercise and NMR EXR  [x] (79258) Provided verbal/tactile cueing for activities related to strengthening, flexibility, endurance, ROM for improvements in LE, proximal hip, and core control with self care, mobility, lifting, ambulation. [x] (62917) Provided verbal/tactile cueing for activities related to improving balance, coordination, kinesthetic sense, posture, motor skill, proprioception  to assist with LE, proximal hip, and core control in self care, mobility, lifting, ambulation and eccentric single leg control.      NMR and Therapeutic Activities:    [x] (40181 or 80968) Provided verbal/tactile cueing for activities related to improving balance, coordination, kinesthetic sense, posture, motor skill, proprioception and motor activation to allow for proper function of core, proximal hip and LE with self care and ADLs and functional mobility.   [] (83946) Gait Re-education- Provided training and instruction to the patient for proper LE, core and proximal hip recruitment and positioning and eccentric body weight control with ambulation re-education including up and down stairs     Home Exercise Program:    [x] (36568) Reviewed/Progressed HEP activities related to strengthening, flexibility, endurance, ROM of core, proximal hip and LE for functional self-care, mobility, lifting and ambulation/stair navigation   [] (34147)Reviewed/Progressed HEP activities related to improving balance, coordination, kinesthetic sense, posture, motor skill, proprioception of core, proximal hip and LE for self care, mobility, lifting, and ambulation/stair navigation      Manual Treatments:  PROM / STM / Oscillations-Mobs:  G-I, II, III, IV (PA's, Inf., Post.)  [x] (52750) Provided manual therapy to mobilize LE, proximal hip and/or LS spine soft tissue/joints for the purpose of modulating pain, promoting relaxation,  increasing ROM, reducing/eliminating soft tissue swelling/inflammation/restriction, improving soft tissue extensibility and allowing for proper ROM for normal function with self care, mobility, lifting and ambulation.             Modalities:     [] GAME READY (VASO)- for significant edema, swelling, pain control. - 15 minutes    Charges:  Timed Code Treatment Minutes: 55   Total Treatment Minutes: 55      [] EVAL (LOW) 80953 (typically 20 minutes face-to-face)  [] EVAL (MOD) 18199 (typically 30 minutes face-to-face)  [] EVAL (HIGH) 68318 (typically 45 minutes face-to-face)  [] RE-EVAL     [x] EV(86630) x 1    [] DRY NEEDLE 1 OR 2 MUSCLES  [x] NMR (36458) x 1    [] DRY NEEDLE 3+ MUSCLES  [x] Manual (07812) x 2     [] TA (65384) x     [] Mech Traction (21008)  [] ES(attended) (20001)     [] ES (un) (18684):   [] VASO (79191)  [] Other:      GOALS:  Patient stated goal: \"get my leg straight and be able to walk without crutch\"  []? Progressing: []? Met: [x]? Not Met: []? Adjusted     Therapist goals for Patient:   Short Term Goals: To be achieved in: 2 weeks  1. Independent in HEP and progression per patient tolerance, in order to prevent re-injury. []? Progressing: []? Met: [x]? Not Met: []? Adjusted  2. Patient will have a decrease in pain to facilitate improvement in movement, function, and ADLs as indicated by Functional Deficits. []?  Progressing: []? Met: [x]? Not Met: []? Adjusted     Long Term Goals: To be achieved in: 8 weeks  1. Disability index score of 10% or less for the LEFS to assist with reaching prior level of function. []? Progressing: []? Met: [x]? Not Met: []? Adjusted  2. Patient will demonstrate increased AROM to full L knee to allow for proper joint functioning as indicated by patients Functional Deficits. []? Progressing: []? Met: [x]? Not Met: []? Adjusted  3. Patient will demonstrate an increase in strength to good proximal hip strength and control, within 5lb HHD in LE to allow for proper functional mobility as indicated by patients Functional Deficits. []? Progressing: []? Met: [x]? Not Met: []? Adjusted  4. Patient will return to daily functional activities without increased symptoms or restriction. []? Progressing: []? Met: [x]? Not Met: []? Adjusted  5. Pt will be able to ambulate without crutch and with full knee extension and quad recruitment without increased symptoms or restrictions. []? Progressing: []? Met: [x]? Not Met: []? Adjusted      ASSESSMENT:  Continued manual focus to address patellar mobility, tib fem mobility and soft tissue restrictions through quad tendon, patellar tendon, hamstrings, and incisions to improve L knee flexion and extension ROM limitations. Noted palpable quarter-sized tissue mass in superomedial R knee at medial femoral condyle, decreased to dime size and less firm w/ palpation today, physician instructed wrapping and swelling control to see how it responds. Continued exercise focus on quad activation/strength tasks to decrease quad inhibition and improve quad firing pattern particularly with stance phase of gait. Improved range w/ overpressure today, however pt still has difficulty getting past -10 extension 2/2 quad strength limitations.     Return to Play: (if applicable)   [x]  Stage 1: Intro to Strength   []  Stage 2: Return to Run and Strength   []  Stage 3: Return to Jump and Strength   []  Stage 4: Dynamic Strength and Agility   []  Stage 5: Sport Specific Training     []  Ready to Return to Play, Meets All Above Stages   []  Not Ready for Return to Sports   Comments:            Treatment/Activity Tolerance:  [x] Patient tolerated treatment well [] Patient limited by fatique  [] Patient limited by pain  [] Patient limited by other medical complications  [] Other:     Overall Progression Towards Functional goals/ Treatment Progress Update:  [] Patient is progressing as expected towards functional goals listed. [x] Progression is slowed due to complexities/Impairments listed. [] Progression has been slowed due to co-morbidities. [] Plan just implemented, too soon to assess goals progression <30days   [] Goals require adjustment due to lack of progress  [] Patient is not progressing as expected and requires additional follow up with physician  [] Other    Prognosis for POC: [x] Good [] Fair  [] Poor    Patient requires continued skilled intervention: [x] Yes  [] No        PLAN: Increase frequency 3x/week w/ focus on manual therapy for restoring ROM. [x] Continue per plan of care [] Alter current plan (see comments)  [] Plan of care initiated [] Hold pending MD visit [] Discharge    Electronically signed by: Brenda Jeffries, PT, DPT    Note: If patient does not return for scheduled/recommended follow up visits, this note will serve as a discharge from care along with the most recent update on progress.

## 2022-04-15 ENCOUNTER — HOSPITAL ENCOUNTER (OUTPATIENT)
Dept: PHYSICAL THERAPY | Age: 22
Setting detail: THERAPIES SERIES
Discharge: HOME | End: 2022-04-15
Payer: COMMERCIAL

## 2022-04-15 PROCEDURE — 97140 MANUAL THERAPY 1/> REGIONS: CPT

## 2022-04-15 PROCEDURE — 97110 THERAPEUTIC EXERCISES: CPT

## 2022-04-15 NOTE — FLOWSHEET NOTE
[place] [address] [facility]  Phone: [phone] Fax: [phone]      Physical Therapy Treatment Note/ Progress Report:     Date:  4/15/2022    Patient Name:  Yamilex Crouch    :  2000  MRN: [id number]  Restrictions/Precautions:    Medical/Treatment Diagnosis Information:  · Diagnosis: Z48.89 encounter for other specified surgical aftercare  · Treatment Diagnosis: M25.562, M25.662, M62.559, R53.1, L43.7  Insurance/Certification information:  PT Insurance Information: Robinson Goldberg; DED NOT MET; DN NOT COVERED; 80/20%; 60 VISITS/YEAR, 7 USED  Physician Information:  Referring Practitioner: Christie Vázquez  Plan of care signed (Y/N):     Date of Patient follow up with Physician:      Progress Report: []  Yes  [x]  No     Date Range for reporting period:  Beginnin2022  Ending:      Progress report due (10 Rx/or 30 days whichever is less):     Recertification due (POC duration/ or 90 days whichever is less): 22     Visit # Insurance Allowable Auth Needed   21 60 ( 7 used) []Yes   []No     Latex Allergy:  [x]NO      []YES  Preferred Language for Healthcare:   [x]English       []other:  Functional Scale: LEFS:   Date assessed: 2022    Pain level:  0-4/10     SUBJECTIVE: Pt states that he just woke up from a nap where his knees were bent. The knee feels stiff. Pt had some soreness after last session. 4 weeks post-scope. OBJECTIVE: Pt arrived >10 min late for session today. 4/15   Observation: ambulating w/ significant limitation in TKE at heel strike. Pt sits with both hips in ER and has difficulty getting into a hip neutral position which could be contributing to the lack of progress with knee extension ROM and quad control.   Despite multiple verbal and tactile cues to engage the quad into extension, stretching with the hip in a rotation neutral position and reviewing HEP, progress has been very slow.         Test measurements:      2/14:  beginning of session  2/16:   beginning of session --> 7-115 end of session  2/18:   beginning of session -->4-115 end of session  2/21:   beginning of session --> 5-115 end of session   2/23:   beginning of session --> 2-118 end of session  2/25:   beginning of session --> 2-120 end of session  2/28:  8-110 beginning of session --> 4-120  end of session  3/2:   beginning of session -->3-120 end of session    3/8:   beginning of session --> 5-120 at end of session               3/28/22: 2 weeks post-op L knee scope: - beginning of session --> -3-110 end of session  Gait:  Ambulating without AD, but has significant limitation in terminal knee extension during stance phase on the L making gait mechanics antalgic during L stance. 3/30/22: -8-110 end of session  4/6/2022: -8-121  4/8/2022: -  4/13/2022: -3-120  4/15/2022: -5-117      RESTRICTIONS/PRECAUTIONS: s/p L ACLr w/ patellar tendon graft,  DOS: 12/17/2021 L knee scope with scar tissue debridement on 3/14/22. Exercises/Interventions:     Therapeutic Ex (41927)  x24 min Sets/sec Reps Notes/CUES   Retro Stepper/BIKE 5 min     LAQ 5'' 10 20 deg ext lag   Seated bag hang - 5# ankle weight  5 min For L knee ext; HEP   Prone leg hang - 4# ankle weight  4 min For L knee ext   Incline calf stretch 30s 5    Seated hamstring stretch w/self OP 30s 5 reviewed   Quad set    Seated heel slides    Prone TKEs    CC TKE 10s 10 3pl   SLR flexion 1 10 Cues to initiate with QS   Leg press - 2-1 ecc - 70# 2 10    Mod.  BOSU lunge isos    Gravity-assisted knee flexion on wall 10s 10    Retro Slide lunge 5sec 10 Cued to pull straight at top   Cybex hamstring curl 1 10 25lb, 2-1 eccentric focus+10 second stretch into extension between reps   Manual Intervention (82584) x 33min       Knee mobs/PROM  8 min End range ext/flexion w/OP   Tib/Fem Mobs  8 min Posteromedial, posterolateral; prone tibial for end range flexion   Patella Mobs  6 min Restricted superiorly/inferiorly   IASTM/STM  8 min Incisions, patella framing, hamstring   Hip IR stretching  3 min  L   NMR Re-education (62429) x 4   CUES NEEDED   Biofeedback w/CC TKE - 2 pl  8 min 10/10; 24 mA stim; 50 mA target   Sport cord march - yellow  2 10 Fwd/bwd; each direction   SLS balance - SC around back of knee cueing TKE - yellow 20s 5                                              Therapeutic Activity (84800)      Ladders      Plyos      Dynamic Balance                  DN + estim   L hamstring         Access Code: [email or web address]  URL: [email or web address]  Date: 03/28/2022  Prepared by: Oral Leger    Exercises  Prone Knee Extension Hang - 2-3 x daily - 7 x weekly - 1 sets - 5-10 min hold  Seated Knee Extension Stretch with Chair - 2-3 x daily - 7 x weekly - 1 sets - 5-10 min hold  Seated Table Hamstring Stretch - 2-3 x daily - 7 x weekly - 1 sets - 5 reps - 20s-30s hold  Long Sitting Quad Set - 2-3 x daily - 7 x weekly - 3 sets - 10 reps  Prone Terminal Knee Extension - 2-3 x daily - 7 x weekly - 1-2 sets - 10 reps - 10s hold  Supine Knee Flexion Wall Slide - 2-3 x daily - 7 x weekly - 1 sets - 10 reps - 10s hold      Therapeutic Exercise and NMR EXR  [x] (80489) Provided verbal/tactile cueing for activities related to strengthening, flexibility, endurance, ROM for improvements in LE, proximal hip, and core control with self care, mobility, lifting, ambulation. [x] (51537) Provided verbal/tactile cueing for activities related to improving balance, coordination, kinesthetic sense, posture, motor skill, proprioception  to assist with LE, proximal hip, and core control in self care, mobility, lifting, ambulation and eccentric single leg control.      NMR and Therapeutic Activities:    [x] (79538 or 83679) Provided verbal/tactile cueing for activities related to improving balance, coordination, kinesthetic sense, posture, motor skill, proprioception and motor activation to allow for proper function of core, proximal hip and LE with self care and ADLs and functional mobility.   [] (51224) Gait Re-education- Provided training and instruction to the patient for proper LE, core and proximal hip recruitment and positioning and eccentric body weight control with ambulation re-education including up and down stairs     Home Exercise Program:    [x] (40964) Reviewed/Progressed HEP activities related to strengthening, flexibility, endurance, ROM of core, proximal hip and LE for functional self-care, mobility, lifting and ambulation/stair navigation   [] (56175)Reviewed/Progressed HEP activities related to improving balance, coordination, kinesthetic sense, posture, motor skill, proprioception of core, proximal hip and LE for self care, mobility, lifting, and ambulation/stair navigation      Manual Treatments:  PROM / STM / Oscillations-Mobs:  G-I, II, III, IV (PA's, Inf., Post.)  [x] (63012) Provided manual therapy to mobilize LE, proximal hip and/or LS spine soft tissue/joints for the purpose of modulating pain, promoting relaxation,  increasing ROM, reducing/eliminating soft tissue swelling/inflammation/restriction, improving soft tissue extensibility and allowing for proper ROM for normal function with self care, mobility, lifting and ambulation.             Modalities:     [] GAME READY (VASO)- for significant edema, swelling, pain control.  - 15 minutes    Charges:  Timed Code Treatment Minutes: 57   Total Treatment Minutes: 57      [] EVAL (LOW) 35250 (typically 20 minutes face-to-face)  [] EVAL (MOD) 65516 (typically 30 minutes face-to-face)  [] EVAL (HIGH) 66756 (typically 45 minutes face-to-face)  [] RE-EVAL     [x] YT(18131) x 2    [] DRY NEEDLE 1 OR 2 MUSCLES  [] NMR (48273) x     [] DRY NEEDLE 3+ MUSCLES  [x] Manual (26495) x 2     [] TA (02666) x     [] Mech Traction (53147)  [] ES(attended) (64061)     [] ES (un) (34025):   [] VASO (86959)  [] Other:      GOALS:  Patient stated goal: \"get my leg straight and be able to walk without crutch\"  []? Progressing: []? Met: [x]? Not Met: []? Adjusted     Therapist goals for Patient:   Short Term Goals: To be achieved in: 2 weeks  1. Independent in HEP and progression per patient tolerance, in order to prevent re-injury. []? Progressing: []? Met: [x]? Not Met: []? Adjusted  2. Patient will have a decrease in pain to facilitate improvement in movement, function, and ADLs as indicated by Functional Deficits. []? Progressing: []? Met: [x]? Not Met: []? Adjusted     Long Term Goals: To be achieved in: 8 weeks  1. Disability index score of 10% or less for the LEFS to assist with reaching prior level of function. []? Progressing: []? Met: [x]? Not Met: []? Adjusted  2. Patient will demonstrate increased AROM to full L knee to allow for proper joint functioning as indicated by patients Functional Deficits. []? Progressing: []? Met: [x]? Not Met: []? Adjusted  3. Patient will demonstrate an increase in strength to good proximal hip strength and control, within 5lb HHD in LE to allow for proper functional mobility as indicated by patients Functional Deficits. []? Progressing: []? Met: [x]? Not Met: []? Adjusted  4. Patient will return to daily functional activities without increased symptoms or restriction. []? Progressing: []? Met: [x]? Not Met: []? Adjusted  5. Pt will be able to ambulate without crutch and with full knee extension and quad recruitment without increased symptoms or restrictions. []? Progressing: []? Met: [x]? Not Met: []? Adjusted      ASSESSMENT:  Continued manual focus to address patellar mobility, tib fem mobility and soft tissue restrictions through quad tendon, patellar tendon, hamstrings, and incisions to improve L knee flexion and extension ROM limitations.  Noted palpable quarter-sized tissue mass in superomedial L knee at medial femoral condyle, decreased to dime size and less firm w/ palpation today, physician instructed wrapping and swelling control to see how it responds. Continued exercise focus on quad activation/strength tasks to decrease quad inhibition and improve quad firing pattern particularly with stance phase of gait. Improved range w/ overpressure today, however ROM returns to -10 each session as Pt still has difficulty 2/2 quad strength limitations. A dynasplint for knee extension ROM would be helpful at this time to make the necessary gains in ROM for Pt to progress into more functional positions and regain quad control in gait to keep ROM between PT sessions. Return to Play: (if applicable)   [x]  Stage 1: Intro to Strength   []  Stage 2: Return to Run and Strength   []  Stage 3: Return to Jump and Strength   []  Stage 4: Dynamic Strength and Agility   []  Stage 5: Sport Specific Training     []  Ready to Return to Play, Meets All Above Stages   []  Not Ready for Return to Sports   Comments:            Treatment/Activity Tolerance:  [x] Patient tolerated treatment well [] Patient limited by fatique  [] Patient limited by pain  [] Patient limited by other medical complications  [] Other:     Overall Progression Towards Functional goals/ Treatment Progress Update:  [] Patient is progressing as expected towards functional goals listed. [x] Progression is slowed due to complexities/Impairments listed. [] Progression has been slowed due to co-morbidities. [] Plan just implemented, too soon to assess goals progression <30days   [] Goals require adjustment due to lack of progress  [] Patient is not progressing as expected and requires additional follow up with physician  [] Other    Prognosis for POC: [x] Good [] Fair  [] Poor    Patient requires continued skilled intervention: [x] Yes  [] No        PLAN: Continue frequency 3x/week w/ focus on manual therapy for restoring ROM.   [x] Continue per plan of care [] Alter current plan (see comments)  [] Plan of care initiated [] Hold pending MD visit [] Discharge    Electronically signed by: Ana Martin, PTA [id number]    Note: If patient does not return for scheduled/recommended follow up visits, this note will serve as a discharge from care along with the most recent update on progress.

## 2022-04-20 ENCOUNTER — HOSPITAL ENCOUNTER (OUTPATIENT)
Dept: PHYSICAL THERAPY | Age: 22
Setting detail: THERAPIES SERIES
Discharge: HOME | End: 2022-04-20
Payer: COMMERCIAL

## 2022-04-20 PROCEDURE — 97140 MANUAL THERAPY 1/> REGIONS: CPT

## 2022-04-20 PROCEDURE — 97110 THERAPEUTIC EXERCISES: CPT

## 2022-04-20 NOTE — FLOWSHEET NOTE
slow.         Test measurements:      2/14:  beginning of session  2/16:   beginning of session --> 7-115 end of session  2/18:   beginning of session -->4-115 end of session  2/21:   beginning of session --> 5-115 end of session   2/23:   beginning of session --> 2-118 end of session  2/25:   beginning of session --> 2-120 end of session  2/28:  8-110 beginning of session --> 4-120  end of session  3/2:   beginning of session -->3-120 end of session    3/8:   beginning of session --> 5-120 at end of session               3/28/22: 2 weeks post-op L knee scope: - beginning of session --> -3-110 end of session  Gait:  Ambulating without AD, but has significant limitation in terminal knee extension during stance phase on the L making gait mechanics antalgic during L stance. 3/30/22: -8-110 end of session  4/6/2022: -8-121  4/8/2022: -  4/13/2022: -3-120  4/15/2022: -5-117  4/20/2022: -3-122    RESTRICTIONS/PRECAUTIONS: s/p L ACLr w/ patellar tendon graft,  DOS: 12/17/2021 L knee scope with scar tissue debridement on 3/14/22. Exercises/Interventions:     Therapeutic Ex (89021)  x24 min Sets/sec Reps Notes/CUES   Retro Stepper/BIKE 5 min     SAQ 5'' 10 7 deg lag over foam roll    LAQ 5'' 10 20 deg ext lag   Seated bag hang - 5# ankle weight   For L knee ext; HEP   Prone leg hang - 4# ankle weight   For L knee ext   Incline calf stretch 30s 5    Seated hamstring stretch w/self OP 30s 5 reviewed   Quad set 10s 10    Seated heel slides      Prone TKEs      CC TKE 10s 10 3pl   SLR flexion 1 10 Cues to initiate with QS   Leg press - 2-1 ecc - 75# 2 10    Mod.  BOSU lunge isos    Gravity-assisted knee flexion on wall 10s 10    Retro Slide lunge 5sec 10 Cued to pull straight at top   Cybex hamstring curl 1 10 25lb, 2-1 eccentric focus+10 second stretch into extension between reps   Manual Intervention (61176) x 28 min       Knee mobs/PROM  8 min End range ext/flexion w/OP   Tib/Fem Mobs  8 min Posteromedial, posterolateral; prone tibial for end range flexion   Patella Mobs  6 min Restricted superiorly/inferiorly   IASTM/STM  3 min Incisions, patella framing   Hip IR stretching  3 min  L         NMR Re-education (46403) x 4   CUES NEEDED   Biofeedback w/CC TKE - 2 pl  8 min 10/10; 24 mA stim; 50 mA target   Sport cord march - yellow  2 10 Fwd/bwd; each direction   SLS balance - SC around back of knee cueing TKE - yellow 20s 5                                              Therapeutic Activity (79248)      Ladders      Bonsai AI      Dynamic Balance                  DN + estim   L hamstring         Access Code: [email or web address]  URL: [email or web address]  Date: 03/28/2022  Prepared by: Guerilne Santanaing    Exercises  Prone Knee Extension Hang - 2-3 x daily - 7 x weekly - 1 sets - 5-10 min hold  Seated Knee Extension Stretch with Chair - 2-3 x daily - 7 x weekly - 1 sets - 5-10 min hold  Seated Table Hamstring Stretch - 2-3 x daily - 7 x weekly - 1 sets - 5 reps - 20s-30s hold  Long Sitting Quad Set - 2-3 x daily - 7 x weekly - 3 sets - 10 reps  Prone Terminal Knee Extension - 2-3 x daily - 7 x weekly - 1-2 sets - 10 reps - 10s hold  Supine Knee Flexion Wall Slide - 2-3 x daily - 7 x weekly - 1 sets - 10 reps - 10s hold      Therapeutic Exercise and NMR EXR  [x] (11864) Provided verbal/tactile cueing for activities related to strengthening, flexibility, endurance, ROM for improvements in LE, proximal hip, and core control with self care, mobility, lifting, ambulation. [x] (11246) Provided verbal/tactile cueing for activities related to improving balance, coordination, kinesthetic sense, posture, motor skill, proprioception  to assist with LE, proximal hip, and core control in self care, mobility, lifting, ambulation and eccentric single leg control.      NMR and Therapeutic Activities:    [x] (03247 or 27211) Provided verbal/tactile cueing for activities related to improving balance, coordination, kinesthetic sense, posture, motor skill, proprioception and motor activation to allow for proper function of core, proximal hip and LE with self care and ADLs and functional mobility.   [] (82892) Gait Re-education- Provided training and instruction to the patient for proper LE, core and proximal hip recruitment and positioning and eccentric body weight control with ambulation re-education including up and down stairs     Home Exercise Program:    [x] (78420) Reviewed/Progressed HEP activities related to strengthening, flexibility, endurance, ROM of core, proximal hip and LE for functional self-care, mobility, lifting and ambulation/stair navigation   [] (34193)Reviewed/Progressed HEP activities related to improving balance, coordination, kinesthetic sense, posture, motor skill, proprioception of core, proximal hip and LE for self care, mobility, lifting, and ambulation/stair navigation      Manual Treatments:  PROM / STM / Oscillations-Mobs:  G-I, II, III, IV (PA's, Inf., Post.)  [x] (42497) Provided manual therapy to mobilize LE, proximal hip and/or LS spine soft tissue/joints for the purpose of modulating pain, promoting relaxation,  increasing ROM, reducing/eliminating soft tissue swelling/inflammation/restriction, improving soft tissue extensibility and allowing for proper ROM for normal function with self care, mobility, lifting and ambulation.             Modalities:     [] GAME READY (VASO)- for significant edema, swelling, pain control.  - 15 minutes    Charges:  Timed Code Treatment Minutes: 50   Total Treatment Minutes: 50      [] EVAL (LOW) 41778 (typically 20 minutes face-to-face)  [] EVAL (MOD) 57017 (typically 30 minutes face-to-face)  [] EVAL (HIGH) 93326 (typically 45 minutes face-to-face)  [] RE-EVAL     [x] YJ(41561) x 1    [] DRY NEEDLE 1 OR 2 MUSCLES  [] NMR (85996) x     [] DRY NEEDLE 3+ MUSCLES  [x] Manual (96703) x 2     [] TA (34796) x     [] Mech Traction (59065)  [] ES(attended) (49028)     [] ES (un) (54613):   [] VASO (38865)  [] Other:      GOALS:  Patient stated goal: \"get my leg straight and be able to walk without crutch\"  []? Progressing: []? Met: [x]? Not Met: []? Adjusted     Therapist goals for Patient:   Short Term Goals: To be achieved in: 2 weeks  1. Independent in HEP and progression per patient tolerance, in order to prevent re-injury. []? Progressing: []? Met: [x]? Not Met: []? Adjusted  2. Patient will have a decrease in pain to facilitate improvement in movement, function, and ADLs as indicated by Functional Deficits. []? Progressing: []? Met: [x]? Not Met: []? Adjusted     Long Term Goals: To be achieved in: 8 weeks  1. Disability index score of 10% or less for the LEFS to assist with reaching prior level of function. []? Progressing: []? Met: [x]? Not Met: []? Adjusted  2. Patient will demonstrate increased AROM to full L knee to allow for proper joint functioning as indicated by patients Functional Deficits. []? Progressing: []? Met: [x]? Not Met: []? Adjusted  3. Patient will demonstrate an increase in strength to good proximal hip strength and control, within 5lb HHD in LE to allow for proper functional mobility as indicated by patients Functional Deficits. []? Progressing: []? Met: [x]? Not Met: []? Adjusted  4. Patient will return to daily functional activities without increased symptoms or restriction. []? Progressing: []? Met: [x]? Not Met: []? Adjusted  5. Pt will be able to ambulate without crutch and with full knee extension and quad recruitment without increased symptoms or restrictions. []? Progressing: []? Met: [x]? Not Met: []? Adjusted      ASSESSMENT:  Continued manual focus to address patellar mobility, tib fem mobility and soft tissue restrictions through quad tendon, patellar tendon, hamstrings, and incisions to improve L knee flexion and extension ROM limitations.  Noted palpable quarter-sized tissue mass in superomedial L knee at medial femoral condyle, decreased to dime size and less firm w/ palpation today, physician instructed wrapping and swelling control to see how it responds. Continued exercise focus on quad activation/strength tasks to decrease quad inhibition and improve quad firing pattern particularly with stance phase of gait. Improved range w/ overpressure today, however ROM returns to -10 each session as Pt still has difficulty 2/2 quad strength limitations. A dynasplint for knee extension ROM would be helpful at this time to make the necessary gains in ROM for Pt to progress into more functional positions and regain quad control in gait to keep ROM between PT sessions. Although pt still lacks functional mobility and ROM, took less work to achieve available ROM today and was less guarded, soft tissue mobility was much better today. Quad strength slowly improving. Return to Play: (if applicable)   [x]  Stage 1: Intro to Strength   []  Stage 2: Return to Run and Strength   []  Stage 3: Return to Jump and Strength   []  Stage 4: Dynamic Strength and Agility   []  Stage 5: Sport Specific Training     []  Ready to Return to Play, Meets All Above Stages   []  Not Ready for Return to Sports   Comments:            Treatment/Activity Tolerance:  [x] Patient tolerated treatment well [] Patient limited by fatique  [] Patient limited by pain  [] Patient limited by other medical complications  [] Other:     Overall Progression Towards Functional goals/ Treatment Progress Update:  [] Patient is progressing as expected towards functional goals listed. [x] Progression is slowed due to complexities/Impairments listed. [] Progression has been slowed due to co-morbidities.   [] Plan just implemented, too soon to assess goals progression <30days   [] Goals require adjustment due to lack of progress  [] Patient is not progressing as expected and requires additional follow up with physician  [] Other    Prognosis for POC: [x] Good [] Fair  [] Poor    Patient requires continued skilled intervention: [x] Yes  [] No        PLAN: Continue frequency 3x/week w/ focus on manual therapy for restoring ROM. [x] Continue per plan of care [] Alter current plan (see comments)  [] Plan of care initiated [] Hold pending MD visit [] Discharge    Electronically signed by: Kareem Samuel PT     Note: If patient does not return for scheduled/recommended follow up visits, this note will serve as a discharge from care along with the most recent update on progress.

## 2022-04-21 ENCOUNTER — HOSPITAL ENCOUNTER (OUTPATIENT)
Dept: PHYSICAL THERAPY | Age: 22
Setting detail: THERAPIES SERIES
Discharge: HOME | End: 2022-04-21
Payer: COMMERCIAL

## 2022-04-21 PROCEDURE — 97140 MANUAL THERAPY 1/> REGIONS: CPT

## 2022-04-21 PROCEDURE — 97110 THERAPEUTIC EXERCISES: CPT

## 2022-04-21 NOTE — FLOWSHEET NOTE
[place] [address] [facility]  Phone: [phone] Fax: [phone]      Physical Therapy Treatment Note/ Progress Report:     Date:  2022    Patient Name:  Zbigniew Quiles    :  2000  MRN: [id number]  Restrictions/Precautions:    Medical/Treatment Diagnosis Information:  · Diagnosis: Z48.89 encounter for other specified surgical aftercare  · Treatment Diagnosis: M25.562, M25.662, M62.559, R53.1, X87.8  Insurance/Certification information:  PT Insurance Information: [employer] [address]; DED NOT MET; DN NOT COVERED; 80/20%; 60 VISITS/YEAR, 7 USED  Physician Information:  Referring Practitioner: Lissette Whitmore  Plan of care signed (Y/N):     Date of Patient follow up with Physician:      Progress Report: []  Yes  [x]  No     Date Range for reporting period:  Beginnin2022  Ending:      Progress report due (10 Rx/or 30 days whichever is less): 30    Recertification due (POC duration/ or 90 days whichever is less): 22     Visit # Insurance Allowable Auth Needed   18 39 ( 7 used) []Yes   []No     Latex Allergy:  [x]NO      []YES  Preferred Language for Healthcare:   [x]English       []other:  Functional Scale: LEFS: 33/80  Date assessed: 2022    Pain level:  0-4/10     SUBJECTIVE: Pt states that he felt ok after yesterday's session. Pt has been trying to stretch a little more at home. Pt has his follow-up with his surgeon in [facility] tomorrow. OBJECTIVE: Pt arrived >10 min late for session today. 4/15   Observation: ambulating w/ significant limitation in TKE at heel strike. Pt sits with both hips in ER and has difficulty getting into a hip neutral position which could be contributing to the lack of progress with knee extension ROM and quad control.   Despite multiple verbal and tactile cues to engage the quad into extension, stretching with the hip in a rotation neutral position and reviewing HEP, progress has been very slow. Test measurements:      2/14:  beginning of session  2/16:   beginning of session --> 7-115 end of session  2/18:   beginning of session -->4-115 end of session  2/21:   beginning of session --> 5-115 end of session   2/23:   beginning of session --> 2-118 end of session  2/25:   beginning of session --> 2-120 end of session  2/28:  8-110 beginning of session --> 4-120  end of session  3/2:   beginning of session -->3-120 end of session    3/8:   beginning of session --> 5-120 at end of session               3/28/22: 2 weeks post-op L knee scope: - beginning of session --> -3-110 end of session  Gait:  Ambulating without AD, but has significant limitation in terminal knee extension during stance phase on the L making gait mechanics antalgic during L stance. 3/30/22: -8-110 end of session  4/6/2022: -8-121  4/8/2022: -  4/13/2022: -3-120  4/15/2022: -5-117  4/20/2022: -3-122  4/21/2022: -3-120    RESTRICTIONS/PRECAUTIONS: s/p L ACLr w/ patellar tendon graft,  DOS: 12/17/2021 L knee scope with scar tissue debridement on 3/14/22. Exercises/Interventions:     Therapeutic Ex (22088)  x20 min Sets/sec Reps Notes/CUES   Retro Stepper/BIKE 5 min     SAQ 7 deg lag over foam roll    LAQ 5'' 10 Black strap to assist into full range+quad iso at top    Seated bag hang - 5# ankle weight   For L knee ext; HEP   Prone leg hang - 4# ankle weight   For L knee ext   Incline calf stretch 30s 5    Seated hamstring stretch w/self OP reviewed   Hamstring stretch c strap 3 min     Quad set 10s 10    BOSU Lunge 10sec 10 QS at top   Prone TKEs      CC TKE 10s 10 3pl   SLR flexion 1 10 Cues to initiate with QS   Leg press - 2-1 ecc - 75# 2 10    Mod.  BOSU lunge isos    Gravity-assisted knee flexion on wall 10s 10    Retro Slide lunge Cued to pull straight at top   Cybex hamstring curl 1 10 25lb, 2-1 eccentric focus+10 second stretch into extension between reps   Manual Intervention (58935) x 29 min       Knee mobs/PROM  8 min End range ext/flexion w/OP   Tib/Fem Mobs  6 min Posteromedial, posterolateral; prone tibial for end range flexion   Patella Mobs  6 min Restricted superiorly/inferiorly   IASTM/STM  6 min Incisions, patella framing   Hip IR stretching  3 min  L         NMR Re-education (81401) x    CUES NEEDED   Biofeedback w/CC TKE - 2 pl  8 min 10/10; 24 mA stim; 50 mA target   Sport cord march - yellow  2 10 Fwd/bwd; each direction   SLS balance - SC around back of knee cueing TKE - yellow 20s 5                                              Therapeutic Activity (87937)      Ladders      Chefs Feed      Dynamic Balance                  DN + estim   L hamstring         Access Code: [email or web address]  URL: [email or web address]  Date: 03/28/2022  Prepared by: Pablo Perez    Exercises  Prone Knee Extension Hang - 2-3 x daily - 7 x weekly - 1 sets - 5-10 min hold  Seated Knee Extension Stretch with Chair - 2-3 x daily - 7 x weekly - 1 sets - 5-10 min hold  Seated Table Hamstring Stretch - 2-3 x daily - 7 x weekly - 1 sets - 5 reps - 20s-30s hold  Long Sitting Quad Set - 2-3 x daily - 7 x weekly - 3 sets - 10 reps  Prone Terminal Knee Extension - 2-3 x daily - 7 x weekly - 1-2 sets - 10 reps - 10s hold  Supine Knee Flexion Wall Slide - 2-3 x daily - 7 x weekly - 1 sets - 10 reps - 10s hold      Therapeutic Exercise and NMR EXR  [x] (01850) Provided verbal/tactile cueing for activities related to strengthening, flexibility, endurance, ROM for improvements in LE, proximal hip, and core control with self care, mobility, lifting, ambulation. [x] (41698) Provided verbal/tactile cueing for activities related to improving balance, coordination, kinesthetic sense, posture, motor skill, proprioception  to assist with LE, proximal hip, and core control in self care, mobility, lifting, ambulation and eccentric single leg control.      NMR and Therapeutic Activities:    [x] (64133 or ) Provided verbal/tactile cueing for activities related to improving balance, coordination, kinesthetic sense, posture, motor skill, proprioception and motor activation to allow for proper function of core, proximal hip and LE with self care and ADLs and functional mobility.   [] (94330) Gait Re-education- Provided training and instruction to the patient for proper LE, core and proximal hip recruitment and positioning and eccentric body weight control with ambulation re-education including up and down stairs     Home Exercise Program:    [x] (92508) Reviewed/Progressed HEP activities related to strengthening, flexibility, endurance, ROM of core, proximal hip and LE for functional self-care, mobility, lifting and ambulation/stair navigation   [] (66943)Reviewed/Progressed HEP activities related to improving balance, coordination, kinesthetic sense, posture, motor skill, proprioception of core, proximal hip and LE for self care, mobility, lifting, and ambulation/stair navigation      Manual Treatments:  PROM / STM / Oscillations-Mobs:  G-I, II, III, IV (PA's, Inf., Post.)  [x] (65104) Provided manual therapy to mobilize LE, proximal hip and/or LS spine soft tissue/joints for the purpose of modulating pain, promoting relaxation,  increasing ROM, reducing/eliminating soft tissue swelling/inflammation/restriction, improving soft tissue extensibility and allowing for proper ROM for normal function with self care, mobility, lifting and ambulation.             Modalities:     [] GAME READY (VASO)- for significant edema, swelling, pain control.  - 15 minutes    Charges:  Timed Code Treatment Minutes: 49   Total Treatment Minutes: 49      [] EVAL (LOW) 44084 (typically 20 minutes face-to-face)  [] EVAL (MOD) 72550 (typically 30 minutes face-to-face)  [] EVAL (HIGH) 03641 (typically 45 minutes face-to-face)  [] RE-EVAL     [x] EM(38096) x 1    [] DRY NEEDLE 1 OR 2 MUSCLES  [] NMR (47283) x     [] DRY NEEDLE 3+ MUSCLES  [x] Manual (95620) x 2     [] TA (93043) x     [] Mech Traction (13181)  [] ES(attended) (59501)     [] ES (un) (18494):   [] VASO (21683)  [] Other:      GOALS:  Patient stated goal: \"get my leg straight and be able to walk without crutch\"  []? Progressing: []? Met: [x]? Not Met: []? Adjusted     Therapist goals for Patient:   Short Term Goals: To be achieved in: 2 weeks  1. Independent in HEP and progression per patient tolerance, in order to prevent re-injury. []? Progressing: []? Met: [x]? Not Met: []? Adjusted  2. Patient will have a decrease in pain to facilitate improvement in movement, function, and ADLs as indicated by Functional Deficits. []? Progressing: []? Met: [x]? Not Met: []? Adjusted     Long Term Goals: To be achieved in: 8 weeks  1. Disability index score of 10% or less for the LEFS to assist with reaching prior level of function. []? Progressing: []? Met: [x]? Not Met: []? Adjusted  2. Patient will demonstrate increased AROM to full L knee to allow for proper joint functioning as indicated by patients Functional Deficits. []? Progressing: []? Met: [x]? Not Met: []? Adjusted  3. Patient will demonstrate an increase in strength to good proximal hip strength and control, within 5lb HHD in LE to allow for proper functional mobility as indicated by patients Functional Deficits. []? Progressing: []? Met: [x]? Not Met: []? Adjusted  4. Patient will return to daily functional activities without increased symptoms or restriction. []? Progressing: []? Met: [x]? Not Met: []? Adjusted  5. Pt will be able to ambulate without crutch and with full knee extension and quad recruitment without increased symptoms or restrictions. []? Progressing: []? Met: [x]? Not Met: []?  Adjusted      ASSESSMENT:  Continued manual focus to address patellar mobility, tib fem mobility and soft tissue restrictions through quad tendon, patellar tendon, hamstrings, and incisions to improve L knee flexion and extension ROM limitations. Noted palpable nickel sized protrusion present superior medial to knee. Continued exercise focus on quad activation/strength tasks to decrease quad inhibition and improve quad firing pattern particularly with stance phase of gait. Improved range w/ overpressure today and making gradual improvements with ROM, having less of a deficit at the start of the session today. Pt still has difficulty maintaining extension 2/2 quad strength limitations. Pt still lacks functional mobility and ROM, took less work to achieve available ROM today and was less guarded, soft tissue mobility was much better today. Quad strength slowly improving. Return to Play: (if applicable)   [x]  Stage 1: Intro to Strength   []  Stage 2: Return to Run and Strength   []  Stage 3: Return to Jump and Strength   []  Stage 4: Dynamic Strength and Agility   []  Stage 5: Sport Specific Training     []  Ready to Return to Play, Meets All Above Stages   []  Not Ready for Return to Sports   Comments:            Treatment/Activity Tolerance:  [x] Patient tolerated treatment well [] Patient limited by fatique  [] Patient limited by pain  [] Patient limited by other medical complications  [] Other:     Overall Progression Towards Functional goals/ Treatment Progress Update:  [] Patient is progressing as expected towards functional goals listed. [x] Progression is slowed due to complexities/Impairments listed. [] Progression has been slowed due to co-morbidities. [] Plan just implemented, too soon to assess goals progression <30days   [] Goals require adjustment due to lack of progress  [] Patient is not progressing as expected and requires additional follow up with physician  [] Other    Prognosis for POC: [x] Good [] Fair  [] Poor    Patient requires continued skilled intervention: [x] Yes  [] No        PLAN: Continue frequency 3x/week w/ focus on manual therapy for restoring ROM.   [x] Continue per plan of care [] Yunier Garcia current plan (see comments)  [] Plan of care initiated [] Hold pending MD visit [] Discharge    Electronically signed by: Jaida Antoine PTA     Note: If patient does not return for scheduled/recommended follow up visits, this note will serve as a discharge from care along with the most recent update on progress.

## 2022-04-22 ENCOUNTER — APPOINTMENT (OUTPATIENT)
Dept: PHYSICAL THERAPY | Age: 22
End: 2022-04-22
Payer: COMMERCIAL

## 2022-04-25 ENCOUNTER — HOSPITAL ENCOUNTER (OUTPATIENT)
Dept: PHYSICAL THERAPY | Age: 22
Setting detail: THERAPIES SERIES
Discharge: HOME | End: 2022-04-25
Payer: COMMERCIAL

## 2022-04-25 PROCEDURE — 97140 MANUAL THERAPY 1/> REGIONS: CPT

## 2022-04-25 PROCEDURE — 97110 THERAPEUTIC EXERCISES: CPT

## 2022-04-26 NOTE — FLOWSHEET NOTE
[place] [address] [facility]  Phone: [phone] Fax: [phone]      Physical Therapy Treatment Note/ Progress Report:     Date:  2022    Patient Name:  Haritha Moise    :  2000  MRN: [id number]  Restrictions/Precautions:    Medical/Treatment Diagnosis Information:  · Diagnosis: Z48.89 encounter for other specified surgical aftercare  · Treatment Diagnosis: M25.562, M25.662, M62.559, R53.1, I85.1  Insurance/Certification information:  PT Insurance Information: Farooq Martinez; DED NOT MET; DN NOT COVERED; 80/20%; 60 VISITS/YEAR, 7 USED  Physician Information:  Referring Practitioner: Terri Jordan of care signed (Y/N):     Date of Patient follow up with Physician:      Progress Report: []  Yes  [x]  No     Date Range for reporting period:  Beginnin2022  Ending:      Progress report due (10 Rx/or 30 days whichever is less): 58    Recertification due (POC duration/ or 90 days whichever is less): 22     Visit # Insurance Allowable Auth Needed    ( 7 used) []Yes   []No     Latex Allergy:  [x]NO      []YES  Preferred Language for Healthcare:   [x]English       []other:  Functional Scale: LEFS: 33/80  Date assessed: 2022    Pain level:  2/10     SUBJECTIVE: Pt states he is tired today. Had follow up w/ surgeon last week and states they will follow up in 4 weeks and decide then if he needs to get a manipulation or not. Not too much discomfort through knee today.       OBJECTIVE:    Observation:      Test measurements:      :  beginning of session  :   beginning of session --> 7-115 end of session  :   beginning of session -->4-115 end of session  :   beginning of session --> 5-115 end of session   :   beginning of session --> 2-118 end of session  :   beginning of session --> 2-120 end of session  :  8-110 beginning of session --> 4-120 end of session  3/2:   beginning of session -->3-120 end of session    3/8:   beginning of session --> 5-120 at end of session               3/28/22: 2 weeks post-op L knee scope: - beginning of session --> -3-110 end of session  Gait:  Ambulating without AD, but has significant limitation in terminal knee extension during stance phase on the L making gait mechanics antalgic during L stance. 3/30/22: -8-110 end of session  4/6/2022: -8-121  4/8/2022: -  4/13/2022: -3-120  4/15/2022: -5-117  4/20/2022: -3-122  4/21/2022: -3-120    RESTRICTIONS/PRECAUTIONS: s/p L ACLr w/ patellar tendon graft,  DOS: 12/17/2021 L knee scope with scar tissue debridement on 3/14/22. Exercises/Interventions:     Therapeutic Ex (09871)  x20 min Sets/sec Reps Notes/CUES   Retro Stepper/BIKE 5 min     SAQ 7 deg lag over foam roll    LAQ 5'' 10 Black strap to assist into full range+quad iso at top    Seated bag hang - 5# ankle weight   For L knee ext; HEP   Prone leg hang - 4# ankle weight   For L knee ext   Incline calf stretch 30s 5    Seated hamstring stretch w/self OP reviewed   Hamstring stretch c strap 3 min     Quad set 10s 10    BOSU Lunge 10sec 10 QS at top   Prone TKEs      CC TKE 10s 10 3pl   SLR flexion 1 10 Cues to initiate with QS   Leg press - 2-1 ecc - 75# 2 10    Mod.  BOSU lunge isos    Gravity-assisted knee flexion on wall 10s 10    Retro Slide lunge Cued to pull straight at top   Cybex hamstring curl 1 10 25lb, 2-1 eccentric focus+10 second stretch into extension between reps   Manual Intervention (85354) x 29 min       Knee mobs/PROM  8 min End range ext/flexion w/OP   Tib/Fem Mobs  6 min Posteromedial, posterolateral; prone tibial for end range flexion   Patella Mobs  6 min Restricted superiorly/inferiorly   IASTM/STM  6 min Incisions, patella framing   Hip IR stretching  3 min  L         NMR Re-education (66928) x    CUES NEEDED   Biofeedback w/CC TKE - 2 pl  8 min 10/10; 24 mA stim; 50 mA target   Sport cord march - yellow  2 10 Fwd/bwd; each direction   SLS balance - SC around back of knee cueing TKE - yellow 20s 5                                              Therapeutic Activity (66424)      Ladders      [place]      Dynamic Balance                  DN + estim   L hamstring         Access Code: [email or web address]  URL: [email or web address]  Date: 03/28/2022  Prepared by: Angelita Arreola    Exercises  Prone Knee Extension Hang - 2-3 x daily - 7 x weekly - 1 sets - 5-10 min hold  Seated Knee Extension Stretch with Chair - 2-3 x daily - 7 x weekly - 1 sets - 5-10 min hold  Seated Table Hamstring Stretch - 2-3 x daily - 7 x weekly - 1 sets - 5 reps - 20s-30s hold  Long Sitting Quad Set - 2-3 x daily - 7 x weekly - 3 sets - 10 reps  Prone Terminal Knee Extension - 2-3 x daily - 7 x weekly - 1-2 sets - 10 reps - 10s hold  Supine Knee Flexion Wall Slide - 2-3 x daily - 7 x weekly - 1 sets - 10 reps - 10s hold      Therapeutic Exercise and NMR EXR  [x] (10569) Provided verbal/tactile cueing for activities related to strengthening, flexibility, endurance, ROM for improvements in LE, proximal hip, and core control with self care, mobility, lifting, ambulation. [x] (96145) Provided verbal/tactile cueing for activities related to improving balance, coordination, kinesthetic sense, posture, motor skill, proprioception  to assist with LE, proximal hip, and core control in self care, mobility, lifting, ambulation and eccentric single leg control.      NMR and Therapeutic Activities:    [x] (10350 or 83182) Provided verbal/tactile cueing for activities related to improving balance, coordination, kinesthetic sense, posture, motor skill, proprioception and motor activation to allow for proper function of core, proximal hip and LE with self care and ADLs and functional mobility.   [] (99496) Gait Re-education- Provided training and instruction to the patient for proper LE, core and proximal hip recruitment and positioning and eccentric body weight control with ambulation re-education including up and down stairs     Home Exercise Program:    [x] (16131) Reviewed/Progressed HEP activities related to strengthening, flexibility, endurance, ROM of core, proximal hip and LE for functional self-care, mobility, lifting and ambulation/stair navigation   [] (56535)Reviewed/Progressed HEP activities related to improving balance, coordination, kinesthetic sense, posture, motor skill, proprioception of core, proximal hip and LE for self care, mobility, lifting, and ambulation/stair navigation      Manual Treatments:  PROM / STM / Oscillations-Mobs:  G-I, II, III, IV (PA's, Inf., Post.)  [x] (94768) Provided manual therapy to mobilize LE, proximal hip and/or LS spine soft tissue/joints for the purpose of modulating pain, promoting relaxation,  increasing ROM, reducing/eliminating soft tissue swelling/inflammation/restriction, improving soft tissue extensibility and allowing for proper ROM for normal function with self care, mobility, lifting and ambulation.             Modalities:     [] GAME READY (VASO)- for significant edema, swelling, pain control. - 15 minutes    Charges:  Timed Code Treatment Minutes: 49   Total Treatment Minutes: 49      [] EVAL (LOW) 74945 (typically 20 minutes face-to-face)  [] EVAL (MOD) 02755 (typically 30 minutes face-to-face)  [] EVAL (HIGH) 28617 (typically 45 minutes face-to-face)  [] RE-EVAL     [x] ZN(64500) x 1    [] DRY NEEDLE 1 OR 2 MUSCLES  [] NMR (79848) x     [] DRY NEEDLE 3+ MUSCLES  [x] Manual (06785) x 2     [] TA (59817) x     [] Mech Traction (86695)  [] ES(attended) (00129)     [] ES (un) (41501):   [] VASO (34703)  [] Other:      GOALS:  Patient stated goal: \"get my leg straight and be able to walk without crutch\"  []? Progressing: []? Met: [x]? Not Met: []? Adjusted     Therapist goals for Patient:   Short Term Goals: To be achieved in: 2 weeks  1.  Independent in HEP and progression per patient tolerance, in order to prevent re-injury. []? Progressing: []? Met: [x]? Not Met: []? Adjusted  2. Patient will have a decrease in pain to facilitate improvement in movement, function, and ADLs as indicated by Functional Deficits. []? Progressing: []? Met: [x]? Not Met: []? Adjusted     Long Term Goals: To be achieved in: 8 weeks  1. Disability index score of 10% or less for the LEFS to assist with reaching prior level of function. []? Progressing: []? Met: [x]? Not Met: []? Adjusted  2. Patient will demonstrate increased AROM to full L knee to allow for proper joint functioning as indicated by patients Functional Deficits. []? Progressing: []? Met: [x]? Not Met: []? Adjusted  3. Patient will demonstrate an increase in strength to good proximal hip strength and control, within 5lb HHD in LE to allow for proper functional mobility as indicated by patients Functional Deficits. []? Progressing: []? Met: [x]? Not Met: []? Adjusted  4. Patient will return to daily functional activities without increased symptoms or restriction. []? Progressing: []? Met: [x]? Not Met: []? Adjusted  5. Pt will be able to ambulate without crutch and with full knee extension and quad recruitment without increased symptoms or restrictions. []? Progressing: []? Met: [x]? Not Met: []? Adjusted      ASSESSMENT:  Continued manual focus to address patellar mobility, tib fem mobility and soft tissue restrictions through quad tendon, patellar tendon, hamstrings, and incisions to improve L knee flexion and extension ROM limitations. Noted palpable nickel sized protrusion present superior medial to knee. Continued exercise focus on quad activation/strength tasks to decrease quad inhibition and improve quad firing pattern particularly with stance phase of gait. Improved range w/ overpressure today and making gradual improvements with ROM, having less of a deficit at the start of the session today.   Pt still has difficulty maintaining extension 2/2 quad strength limitations. Pt still lacks functional mobility and ROM, took less work to achieve available ROM today and was less guarded, soft tissue mobility was much better today. Quad strength slowly improving. Return to Play: (if applicable)   [x]  Stage 1: Intro to Strength   []  Stage 2: Return to Run and Strength   []  Stage 3: Return to Jump and Strength   []  Stage 4: Dynamic Strength and Agility   []  Stage 5: Sport Specific Training     []  Ready to Return to Play, Meets All Above Stages   []  Not Ready for Return to Sports   Comments:            Treatment/Activity Tolerance:  [x] Patient tolerated treatment well [] Patient limited by fatique  [] Patient limited by pain  [] Patient limited by other medical complications  [] Other:     Overall Progression Towards Functional goals/ Treatment Progress Update:  [] Patient is progressing as expected towards functional goals listed. [x] Progression is slowed due to complexities/Impairments listed. [] Progression has been slowed due to co-morbidities. [] Plan just implemented, too soon to assess goals progression <30days   [] Goals require adjustment due to lack of progress  [] Patient is not progressing as expected and requires additional follow up with physician  [] Other    Prognosis for POC: [x] Good [] Fair  [] Poor    Patient requires continued skilled intervention: [x] Yes  [] No        PLAN: Continue frequency 3x/week w/ focus on manual therapy for restoring ROM. [x] Continue per plan of care [] Alter current plan (see comments)  [] Plan of care initiated [] Hold pending MD visit [] Discharge    Electronically signed by: Stephania Whitaker PT     Note: If patient does not return for scheduled/recommended follow up visits, this note will serve as a discharge from care along with the most recent update on progress.

## 2022-04-27 ENCOUNTER — HOSPITAL ENCOUNTER (OUTPATIENT)
Dept: PHYSICAL THERAPY | Age: 22
Setting detail: THERAPIES SERIES
Discharge: HOME | End: 2022-04-27
Payer: COMMERCIAL

## 2022-04-27 PROCEDURE — 97110 THERAPEUTIC EXERCISES: CPT

## 2022-04-27 PROCEDURE — 97140 MANUAL THERAPY 1/> REGIONS: CPT

## 2022-04-27 NOTE — FLOWSHEET NOTE
[place] [address] [facility]  Phone: [phone] Fax: [phone]      Physical Therapy Treatment Note/ Progress Report:     Date:  2022    Patient Name:  Darrell Farooq    :  2000  MRN: [id number]  Restrictions/Precautions:    Medical/Treatment Diagnosis Information:  · Diagnosis: Z48.89 encounter for other specified surgical aftercare  · Treatment Diagnosis: M25.562, M25.662, M62.559, R53.1, M05.4  Insurance/Certification information:  PT Insurance Information: Yany Azevedo; DED NOT MET; DN NOT COVERED; 80/20%; 60 VISITS/YEAR, 7 USED  Physician Information:  Referring Practitioner: Bedelia Coast of care signed (Y/N):     Date of Patient follow up with Physician:      Progress Report: []  Yes  [x]  No     Date Range for reporting period:  Beginnin2022  Ending:      Progress report due (10 Rx/or 30 days whichever is less):     Recertification due (POC duration/ or 90 days whichever is less): 22     Visit # Insurance Allowable Auth Needed   24 60 ( 7 used) []Yes   []No     Latex Allergy:  [x]NO      []YES  Preferred Language for Healthcare:   [x]English       []other:  Functional Scale: LEFS: 33/80  Date assessed: 2022    Pain level:  2/10     SUBJECTIVE: L knee feels pretty good today. Noticing a lot less stiffness and pain in the L knee, particularly after getting up from sitting in class. Had follow up with surgeon who states they will follow up in 4 weeks and decide then if he needs to get a manipulation or not.       OBJECTIVE:    Observation:      Test measurements:      :  beginning of session  :   beginning of session --> 7-115 end of session  :   beginning of session -->4-115 end of session  :   beginning of session --> 5-115 end of session   :   beginning of session --> 2-118 end of session  :   beginning of session --> 2-120 end of session  2/28:  8-110 beginning of session --> 4-120  end of session  3/2:   beginning of session -->3-120 end of session    3/8:   beginning of session --> 5-120 at end of session             3/28/22: 2 weeks post-op L knee scope: - beginning of session --> -3-110 end of session  Gait:  Ambulating without AD, but has significant limitation in terminal knee extension during stance phase on the L making gait mechanics antalgic during L stance. 3/30/22: -8-110 end of session  4/6/2022: -8-121  4/8/2022: -  4/13/2022: -3-120  4/15/2022: -5-117  4/20/2022: -3-122  4/21/2022: -3-120    RESTRICTIONS/PRECAUTIONS: s/p L ACLr w/ patellar tendon graft,  DOS: 12/17/2021 L knee scope with scar tissue debridement on 3/14/22.      Exercises/Interventions:     Therapeutic Ex (39006)  x20 min Sets/sec Reps Notes/CUES   Retro Stepper/BIKE 5 min     SAQ 7 deg lag over foam roll    LAQ 5s 10 Black strap to assist into full range+quad iso at top    Seated bag hang - 5# ankle weight   For L knee ext; HEP   Prone leg hang - 4# ankle weight   For L knee ext   Incline calf stretch 30s 5    Seated hamstring stretch w/self OP reviewed   Hamstring stretch w/strap pulling ankle into DF 30s 5    Quad set w/strap pulling ankle into DF 10s 10 Towel roll under ankle   BOSU Lunge 10sec 10 QS at top   Prone TKEs      CC TKE 10s 10 3pl   SLR flexion 1 10 Cues to initiate with QS   Leg press - 2-1 ecc - 75# 2 10    Retro Slide lunge Cued to pull straight at top   Cybex hamstring curl 1 10 25lb, 2-1 eccentric focus+10 second stretch into extension between reps                     Manual Intervention (82359)      Knee mobs/PROM  8 min End range ext/flexion w/OP   Tib/Fem Mobs  6 min Posteromedial, posterolateral; prone tibial for end range flexion   Patella Mobs  6 min Restricted superiorly/inferiorly   IASTM/STM  4 min Incisions, patella framing   Hip IR stretching  3 min  L         NMR Re-education (75135)    CUES NEEDED Biofeedback w/CC TKE - 2 pl  8 min 10/10; 24 mA stim; 50 mA target   Sport cord march - yellow  2 10 Fwd/bwd; each direction   SLS balance - SC around back of knee cueing TKE - yellow 20s 5                                              Therapeutic Activity (53966)      Ladders      [place]      Dynamic Balance                  DN + estim   L hamstring         Access Code: [email or web address]  URL: [email or web address]  Date: 03/28/2022  Prepared by: Joanne Mota    Exercises  Prone Knee Extension Hang - 2-3 x daily - 7 x weekly - 1 sets - 5-10 min hold  Seated Knee Extension Stretch with Chair - 2-3 x daily - 7 x weekly - 1 sets - 5-10 min hold  Seated Table Hamstring Stretch - 2-3 x daily - 7 x weekly - 1 sets - 5 reps - 20s-30s hold  Long Sitting Quad Set - 2-3 x daily - 7 x weekly - 3 sets - 10 reps  Prone Terminal Knee Extension - 2-3 x daily - 7 x weekly - 1-2 sets - 10 reps - 10s hold  Supine Knee Flexion Wall Slide - 2-3 x daily - 7 x weekly - 1 sets - 10 reps - 10s hold      Therapeutic Exercise and NMR EXR  [x] (99487) Provided verbal/tactile cueing for activities related to strengthening, flexibility, endurance, ROM for improvements in LE, proximal hip, and core control with self care, mobility, lifting, ambulation. [x] (45793) Provided verbal/tactile cueing for activities related to improving balance, coordination, kinesthetic sense, posture, motor skill, proprioception  to assist with LE, proximal hip, and core control in self care, mobility, lifting, ambulation and eccentric single leg control.      NMR and Therapeutic Activities:    [x] (28826 or 43856) Provided verbal/tactile cueing for activities related to improving balance, coordination, kinesthetic sense, posture, motor skill, proprioception and motor activation to allow for proper function of core, proximal hip and LE with self care and ADLs and functional mobility.   [] (60072) Gait Re-education- Provided training and instruction to the patient for proper LE, core and proximal hip recruitment and positioning and eccentric body weight control with ambulation re-education including up and down stairs     Home Exercise Program:    [x] (97301) Reviewed/Progressed HEP activities related to strengthening, flexibility, endurance, ROM of core, proximal hip and LE for functional self-care, mobility, lifting and ambulation/stair navigation   [] (43649)Reviewed/Progressed HEP activities related to improving balance, coordination, kinesthetic sense, posture, motor skill, proprioception of core, proximal hip and LE for self care, mobility, lifting, and ambulation/stair navigation      Manual Treatments:  PROM / STM / Oscillations-Mobs:  G-I, II, III, IV (PA's, Inf., Post.)  [x] (26302) Provided manual therapy to mobilize LE, proximal hip and/or LS spine soft tissue/joints for the purpose of modulating pain, promoting relaxation,  increasing ROM, reducing/eliminating soft tissue swelling/inflammation/restriction, improving soft tissue extensibility and allowing for proper ROM for normal function with self care, mobility, lifting and ambulation.             Modalities:     [] GAME READY (VASO)- for significant edema, swelling, pain control. - 15 minutes    Charges:  Timed Code Treatment Minutes: 55   Total Treatment Minutes: 55      [] EVAL (LOW) 62138 (typically 20 minutes face-to-face)  [] EVAL (MOD) 77747 (typically 30 minutes face-to-face)  [] EVAL (HIGH) 17612 (typically 45 minutes face-to-face)  [] RE-EVAL     [x] MG(15358) x 2    [] DRY NEEDLE 1 OR 2 MUSCLES  [] NMR (39990) x     [] DRY NEEDLE 3+ MUSCLES  [x] Manual (63814) x 2     [] TA (81390) x     [] Mech Traction (78713)  [] ES(attended) (72699)     [] ES (un) (94850):   [] VASO (17342)  [] Other:      GOALS:  Patient stated goal: \"get my leg straight and be able to walk without crutch\"  []? Progressing: []? Met: [x]? Not Met: []? Adjusted     Therapist goals for Patient:   Short Term Goals:  To be achieved in: 2 weeks  1. Independent in HEP and progression per patient tolerance, in order to prevent re-injury. []? Progressing: []? Met: [x]? Not Met: []? Adjusted  2. Patient will have a decrease in pain to facilitate improvement in movement, function, and ADLs as indicated by Functional Deficits. []? Progressing: []? Met: [x]? Not Met: []? Adjusted     Long Term Goals: To be achieved in: 8 weeks  1. Disability index score of 10% or less for the LEFS to assist with reaching prior level of function. []? Progressing: []? Met: [x]? Not Met: []? Adjusted  2. Patient will demonstrate increased AROM to full L knee to allow for proper joint functioning as indicated by patients Functional Deficits. []? Progressing: []? Met: [x]? Not Met: []? Adjusted  3. Patient will demonstrate an increase in strength to good proximal hip strength and control, within 5lb HHD in LE to allow for proper functional mobility as indicated by patients Functional Deficits. []? Progressing: []? Met: [x]? Not Met: []? Adjusted  4. Patient will return to daily functional activities without increased symptoms or restriction. []? Progressing: []? Met: [x]? Not Met: []? Adjusted  5. Pt will be able to ambulate without crutch and with full knee extension and quad recruitment without increased symptoms or restrictions. []? Progressing: []? Met: [x]? Not Met: []? Adjusted      ASSESSMENT:  Had less L knee ext deficit at start of session today. Improved range with overpressure today and making gradual improvements with ROM. Pt still has difficulty maintaining extension secondary to quad strength limitations. Quad strength slowly improving. ROM and quad strength still primary focus.      Return to Play: (if applicable)   [x]  Stage 1: Intro to Strength   []  Stage 2: Return to Run and Strength   []  Stage 3: Return to Jump and Strength   []  Stage 4: Dynamic Strength and Agility   []  Stage 5: Sport Specific Training     []  Ready to Return to Play, [employer] All Above Stages   []  Not Ready for Return to Sports   Comments:            Treatment/Activity Tolerance:  [x] Patient tolerated treatment well [] Patient limited by fatique  [] Patient limited by pain  [] Patient limited by other medical complications  [] Other:     Overall Progression Towards Functional goals/ Treatment Progress Update:  [] Patient is progressing as expected towards functional goals listed. [x] Progression is slowed due to complexities/Impairments listed. [] Progression has been slowed due to co-morbidities. [] Plan just implemented, too soon to assess goals progression <30days   [] Goals require adjustment due to lack of progress  [] Patient is not progressing as expected and requires additional follow up with physician  [] Other    Prognosis for POC: [x] Good [] Fair  [] Poor    Patient requires continued skilled intervention: [x] Yes  [] No        PLAN: Continue frequency 3x/week w/ focus on manual therapy for restoring ROM. [x] Continue per plan of care [] Alter current plan (see comments)  [] Plan of care initiated [] Hold pending MD visit [] Discharge    Electronically signed by: Angela Cabrales PT, DPT, MS, SCS    Note: If patient does not return for scheduled/recommended follow up visits, this note will serve as a discharge from care along with the most recent update on progress.

## 2022-04-29 ENCOUNTER — HOSPITAL ENCOUNTER (OUTPATIENT)
Dept: PHYSICAL THERAPY | Age: 22
Setting detail: THERAPIES SERIES
Discharge: HOME | End: 2022-04-29
Payer: COMMERCIAL

## 2022-04-29 PROCEDURE — 97110 THERAPEUTIC EXERCISES: CPT

## 2022-04-29 PROCEDURE — 97140 MANUAL THERAPY 1/> REGIONS: CPT

## 2022-04-29 NOTE — FLOWSHEET NOTE
[place] [address] [facility]  Phone: [phone] Fax: [phone]      Physical Therapy Treatment Note/ Progress Report:     Date:  2022    Patient Name:  Haritha Moise    :  2000  MRN: [id number]  Restrictions/Precautions:    Medical/Treatment Diagnosis Information:  · Diagnosis: Z48.89 encounter for other specified surgical aftercare  · Treatment Diagnosis: M25.562, M25.662, M62.559, R53.1, C53.4  Insurance/Certification information:  PT Insurance Information: BCBS; DED NOT MET; DN NOT COVERED; 80/20%; 60 VISITS/YEAR, 7 USED  Physician Information:  Referring Practitioner: Terri Jordan of care signed (Y/N):     Date of Patient follow up with Physician:      Progress Report: []  Yes  [x]  No     Date Range for reporting period:  Beginnin2022  Ending:      Progress report due (10 Rx/or 30 days whichever is less): 3/31/83    Recertification due (POC duration/ or 90 days whichever is less): 22     Visit # Insurance Allowable Auth Needed   25 60 ( 7 used) []Yes   []No     Latex Allergy:  [x]NO      []YES  Preferred Language for Healthcare:   [x]English       []other:  Functional Scale: LEFS: 33/80  Date assessed: 2022    Pain level:  2/10     SUBJECTIVE: L knee feels pretty good today, a little tight but overall feeling much better. No longer taking elevators and feeling more confident on stairs.      OBJECTIVE:    Observation:      Test measurements:      :  beginning of session  :   beginning of session --> 7-115 end of session  :   beginning of session -->4-115 end of session  :   beginning of session --> 5-115 end of session   :   beginning of session --> 2-118 end of session  :   beginning of session --> 2-120 end of session  :  8-110 beginning of session --> 4-120  end of session  3/2:   beginning of session -->3-120 end of session    3/8:   beginning of session --> 5-120 at end of session             3/28/22: 2 weeks post-op L knee scope: - beginning of session --> -3-110 end of session  Gait:  Ambulating without AD, but has significant limitation in terminal knee extension during stance phase on the L making gait mechanics antalgic during L stance. 3/30/22: -8-110 end of session  4/6/2022: -8-121  4/8/2022: -  4/13/2022: -3-120  4/15/2022: -5-117  4/20/2022: -3-122  4/21/2022: -3-120    RESTRICTIONS/PRECAUTIONS: s/p L ACLr w/ patellar tendon graft,  DOS: 12/17/2021 L knee scope with scar tissue debridement on 3/14/22.      Exercises/Interventions:     Therapeutic Ex (35550)  x20 min Sets/sec Reps Notes/CUES   Retro Stepper/BIKE 5 min     SAQ 7 deg lag over foam roll    LAQ 5s 10 Black strap to assist into full range+quad iso at top    Seated bag hang - 5# ankle weight   For L knee ext; HEP   Prone leg hang - 4# ankle weight   For L knee ext   Incline calf stretch 30s 5    Seated hamstring stretch w/self OP reviewed   Hamstring stretch w/strap pulling ankle into DF 30s 5    Quad set w/strap pulling ankle into DF 10s 10 Towel roll under ankle   BOSU Lunge 10sec 10 QS at top   Prone TKEs      CC TKE 10s 10 3pl   SLR flexion 1 10 Cues to initiate with QS   Leg press - 2-1 ecc - 75# 2 10    Retro Slide lunge Cued to pull straight at top   Cybex hamstring curl 1 10 25lb, 2-1 eccentric focus+10 second stretch into extension between reps   Step up w/ TKE 2 10 Yellow, 6 in               Manual Intervention (38991)      Knee mobs/PROM  8 min End range ext/flexion w/OP   Tib/Fem Mobs  6 min Posteromedial, posterolateral; prone tibial for end range flexion   Patella Mobs  6 min Restricted superiorly/inferiorly   IASTM/STM  4 min Incisions, patella framing   Hip IR stretching  3 min  L         NMR Re-education (41864)    CUES NEEDED   Biofeedback w/CC TKE - 2 pl  8 min 10/10; 24 mA stim; 50 mA target   Sport cord march - yellow  2 10 Fwd/bwd; each direction   SLS balance - SC around back of knee cueing TKE - yellow 20s 5                                              Therapeutic Activity (01925)      Ladders      [employer]      Dynamic Balance                  DN + estim   L hamstring         Access Code: [email or web address]  URL: [email or web address]  Date: 03/28/2022  Prepared by: Wylie Najjar    Exercises  Prone Knee Extension Hang - 2-3 x daily - 7 x weekly - 1 sets - 5-10 min hold  Seated Knee Extension Stretch with Chair - 2-3 x daily - 7 x weekly - 1 sets - 5-10 min hold  Seated Table Hamstring Stretch - 2-3 x daily - 7 x weekly - 1 sets - 5 reps - 20s-30s hold  Long Sitting Quad Set - 2-3 x daily - 7 x weekly - 3 sets - 10 reps  Prone Terminal Knee Extension - 2-3 x daily - 7 x weekly - 1-2 sets - 10 reps - 10s hold  Supine Knee Flexion Wall Slide - 2-3 x daily - 7 x weekly - 1 sets - 10 reps - 10s hold      Therapeutic Exercise and NMR EXR  [x] (92743) Provided verbal/tactile cueing for activities related to strengthening, flexibility, endurance, ROM for improvements in LE, proximal hip, and core control with self care, mobility, lifting, ambulation. [x] (23097) Provided verbal/tactile cueing for activities related to improving balance, coordination, kinesthetic sense, posture, motor skill, proprioception  to assist with LE, proximal hip, and core control in self care, mobility, lifting, ambulation and eccentric single leg control.      NMR and Therapeutic Activities:    [x] (43930 or 28259) Provided verbal/tactile cueing for activities related to improving balance, coordination, kinesthetic sense, posture, motor skill, proprioception and motor activation to allow for proper function of core, proximal hip and LE with self care and ADLs and functional mobility.   [] (34740) Gait Re-education- Provided training and instruction to the patient for proper LE, core and proximal hip recruitment and positioning and eccentric body weight control with ambulation re-education including up and down stairs     Home Exercise Program:    [x] (81082) Reviewed/Progressed HEP activities related to strengthening, flexibility, endurance, ROM of core, proximal hip and LE for functional self-care, mobility, lifting and ambulation/stair navigation   [] (79828)Reviewed/Progressed HEP activities related to improving balance, coordination, kinesthetic sense, posture, motor skill, proprioception of core, proximal hip and LE for self care, mobility, lifting, and ambulation/stair navigation      Manual Treatments:  PROM / STM / Oscillations-Mobs:  G-I, II, III, IV (PA's, Inf., Post.)  [x] (96656) Provided manual therapy to mobilize LE, proximal hip and/or LS spine soft tissue/joints for the purpose of modulating pain, promoting relaxation,  increasing ROM, reducing/eliminating soft tissue swelling/inflammation/restriction, improving soft tissue extensibility and allowing for proper ROM for normal function with self care, mobility, lifting and ambulation.             Modalities:     [] GAME READY (VASO)- for significant edema, swelling, pain control. - 15 minutes    Charges:  Timed Code Treatment Minutes: 55   Total Treatment Minutes: 55      [] EVAL (LOW) 25822 (typically 20 minutes face-to-face)  [] EVAL (MOD) 10884 (typically 30 minutes face-to-face)  [] EVAL (HIGH) 35781 (typically 45 minutes face-to-face)  [] RE-EVAL     [x] CJ(44612) x 2    [] DRY NEEDLE 1 OR 2 MUSCLES  [] NMR (37502) x     [] DRY NEEDLE 3+ MUSCLES  [x] Manual (37383) x 2     [] TA (69449) x     [] [facility] (99533[facility]  [] ES(attended) (74535)     [] ES (un) (13963):   [] VASO (60272)  [] Other:      GOALS:  Patient stated goal: \"get my leg straight and be able to walk without crutch\"  []? Progressing: []? Met: [x]? Not Met: []? Adjusted     Therapist goals for Patient:   Short Term Goals: To be achieved in: 2 weeks  1.  Independent in HEP and progression per patient tolerance, in order to prevent re-injury. []? Progressing: []? Met: [x]? Not Met: []? Adjusted  2. Patient will have a decrease in pain to facilitate improvement in movement, function, and ADLs as indicated by Functional Deficits. []? Progressing: []? Met: [x]? Not Met: []? Adjusted     Long Term Goals: To be achieved in: 8 weeks  1. Disability index score of 10% or less for the LEFS to assist with reaching prior level of function. []? Progressing: []? Met: [x]? Not Met: []? Adjusted  2. Patient will demonstrate increased AROM to full L knee to allow for proper joint functioning as indicated by patients Functional Deficits. []? Progressing: []? Met: [x]? Not Met: []? Adjusted  3. Patient will demonstrate an increase in strength to good proximal hip strength and control, within 5lb HHD in LE to allow for proper functional mobility as indicated by patients Functional Deficits. []? Progressing: []? Met: [x]? Not Met: []? Adjusted  4. Patient will return to daily functional activities without increased symptoms or restriction. []? Progressing: []? Met: [x]? Not Met: []? Adjusted  5. Pt will be able to ambulate without crutch and with full knee extension and quad recruitment without increased symptoms or restrictions. []? Progressing: []? Met: [x]? Not Met: []? Adjusted      ASSESSMENT: A little tighter at end range ext today than last session. Improved range with overpressure today and making gradual improvements with ROM. Pt still has difficulty maintaining extension secondary to quad strength limitations. Quad strength slowly improving. ROM and quad strength still primary focus.      Return to Play: (if applicable)   [x]  Stage 1: Intro to Strength   []  Stage 2: Return to Run and Strength   []  Stage 3: Return to Jump and Strength   []  Stage 4: Dynamic Strength and Agility   []  Stage 5: Sport Specific Training     []  Ready to Return to Play, Meets All Above Stages   []  Not Ready for Return to Sports   Comments: Treatment/Activity Tolerance:  [x] Patient tolerated treatment well [] Patient limited by fatique  [] Patient limited by pain  [] Patient limited by other medical complications  [] Other:     Overall Progression Towards Functional goals/ Treatment Progress Update:  [] Patient is progressing as expected towards functional goals listed. [x] Progression is slowed due to complexities/Impairments listed. [] Progression has been slowed due to co-morbidities. [] Plan just implemented, too soon to assess goals progression <30days   [] Goals require adjustment due to lack of progress  [] Patient is not progressing as expected and requires additional follow up with physician  [] Other    Prognosis for POC: [x] Good [] Fair  [] Poor    Patient requires continued skilled intervention: [x] Yes  [] No        PLAN: Continue frequency 3x/week w/ focus on manual therapy for restoring ROM. [x] Continue per plan of care [] Alter current plan (see comments)  [] Plan of care initiated [] Hold pending MD visit [] Discharge    Electronically signed by: Yael Saldaña PT, DPT  Assisted during session by Clarisa Wiley PTA    Note: If patient does not return for scheduled/recommended follow up visits, this note will serve as a discharge from care along with the most recent update on progress.

## 2022-05-02 ENCOUNTER — HOSPITAL ENCOUNTER (OUTPATIENT)
Dept: PHYSICAL THERAPY | Age: 22
Setting detail: THERAPIES SERIES
Discharge: HOME | End: 2022-05-02
Payer: COMMERCIAL

## 2022-05-02 PROCEDURE — 97140 MANUAL THERAPY 1/> REGIONS: CPT

## 2022-05-02 PROCEDURE — 97110 THERAPEUTIC EXERCISES: CPT

## 2022-05-02 NOTE — FLOWSHEET NOTE
[address] [place] [address] [place] [address]  Phone: [phone] Fax: [phone]      Physical Therapy Treatment Note/ Progress Report:     Date:  2022    Patient Name:  Florencio Sun    :  2000  MRN: [id number]  Restrictions/Precautions:    Medical/Treatment Diagnosis Information:  · Diagnosis: Z48.89 encounter for other specified surgical aftercare  · Treatment Diagnosis: M25.562, M25.662, M62.559, R53.1, W77.1  Insurance/Certification information:  PT Insurance Information: BCBS; DED NOT MET; DN NOT COVERED; 80/20%; 60 VISITS/YEAR, 7 USED  Physician Information:  Referring Practitioner: Darlene Pedroza  Plan of care signed (Y/N):     Date of Patient follow up with Physician:      Progress Report: []  Yes  [x]  No     Date Range for reporting period:  Beginnin2022  Ending:      Progress report due (10 Rx/or 30 days whichever is less): 57    Recertification due (POC duration/ or 90 days whichever is less): 22     Visit # Insurance Allowable Auth Needed   26 60 ( 7 used) []Yes   []No     Latex Allergy:  [x]NO      []YES  Preferred Language for Healthcare:   [x]English       []other:  Functional Scale: LEFS: 33/80  Date assessed: 2022    Pain level:  2/10     SUBJECTIVE: Patient reports that his knee is stiff today but he did some foam rolling before coming in today.       OBJECTIVE:    Observation:      Test measurements:      :  beginning of session  :   beginning of session --> 7-115 end of session  :   beginning of session -->4-115 end of session  :   beginning of session --> 5-115 end of session   :   beginning of session --> 2-118 end of session  :   beginning of session --> 2-120 end of session  :  8-110 beginning of session --> 4-120  end of session  3/2:   beginning of session -->3-120 end of session    3/8:   beginning of session --> 5-120 at end of session             3/28/22: 2 weeks post-op L knee scope: - beginning of session --> -3-110 end of session  Gait:  Ambulating without AD, but has significant limitation in terminal knee extension during stance phase on the L making gait mechanics antalgic during L stance. 3/30/22: -8-110 end of session  4/6/2022: -8-121  4/8/2022: -  4/13/2022: -3-120  4/15/2022: -5-117  4/20/2022: -3-122  4/21/2022: -3-120    RESTRICTIONS/PRECAUTIONS: s/p L ACLr w/ patellar tendon graft,  DOS: 12/17/2021 L knee scope with scar tissue debridement on 3/14/22.      Exercises/Interventions:     Therapeutic Ex (29478)  x20 min Sets/sec Reps Notes/CUES   Retro Stepper/BIKE 5 min     SAQ 7 deg lag over foam roll    LAQ 5s 10 Black strap to assist into full range+quad iso at top    Seated bag hang - 5# ankle weight   For L knee ext; HEP   Prone leg hang - 4# ankle weight   For L knee ext   Incline calf stretch 30s 5    Seated hamstring stretch w/self OP reviewed   Hamstring stretch w/strap pulling ankle into DF 30s 5    Quad set w/strap pulling ankle into DF 10s 10 Towel roll under ankle   BOSU Lunge 10sec 10 QS at top   Prone TKEs      CC TKE 10s 10 3pl   SLR flexion 1 10 Cues to initiate with QS   Leg press - 2-1 ecc - 75# 2 10    Retro Slide lunge Cued to pull straight at top   Cybex hamstring curl 1 10 25lb, 2-1 eccentric focus+10 second stretch into extension between reps   Step up w/ TKE 2 10 Yellow, 6 in               Manual Intervention (53101)      Knee mobs/PROM  8 min End range ext/flexion w/OP   Tib/Fem Mobs  6 min Posteromedial, posterolateral; prone tibial for end range flexion   Patella Mobs  6 min Restricted superiorly/inferiorly   IASTM/STM  4 min Incisions, patella framing   Hip IR stretching  3 min  L         NMR Re-education (10501)    CUES NEEDED   Biofeedback w/CC TKE - 2 pl  8 min 10/10; 24 mA stim; 50 mA target   Sport cord march - yellow  2 10 Fwd/bwd; each direction   SLS balance - SC around back of knee cueing TKE - yellow 20s 5                                              Therapeutic Activity (11582)      Ladders      [employer]      Dynamic Balance                  DN + estim   L hamstring         Access Code: [email or web address]  URL: [email or web address]/  Date: 03/28/2022  Prepared by: Ricardo Major    Exercises  Prone Knee Extension Hang - 2-3 x daily - 7 x weekly - 1 sets - 5-10 min hold  Seated Knee Extension Stretch with Chair - 2-3 x daily - 7 x weekly - 1 sets - 5-10 min hold  Seated Table Hamstring Stretch - 2-3 x daily - 7 x weekly - 1 sets - 5 reps - 20s-30s hold  Long Sitting Quad Set - 2-3 x daily - 7 x weekly - 3 sets - 10 reps  Prone Terminal Knee Extension - 2-3 x daily - 7 x weekly - 1-2 sets - 10 reps - 10s hold  Supine Knee Flexion Wall Slide - 2-3 x daily - 7 x weekly - 1 sets - 10 reps - 10s hold      Therapeutic Exercise and NMR EXR  [x] (93871) Provided verbal/tactile cueing for activities related to strengthening, flexibility, endurance, ROM for improvements in LE, proximal hip, and core control with self care, mobility, lifting, ambulation. [x] (30028) Provided verbal/tactile cueing for activities related to improving balance, coordination, kinesthetic sense, posture, motor skill, proprioception  to assist with LE, proximal hip, and core control in self care, mobility, lifting, ambulation and eccentric single leg control.      NMR and Therapeutic Activities:    [x] (61086 or 86070) Provided verbal/tactile cueing for activities related to improving balance, coordination, kinesthetic sense, posture, motor skill, proprioception and motor activation to allow for proper function of core, proximal hip and LE with self care and ADLs and functional mobility.   [] (76006) Gait Re-education- Provided training and instruction to the patient for proper LE, core and proximal hip recruitment and positioning and eccentric body weight control with ambulation re-education including up and down stairs     Home Exercise Program:    [x] (36518) Reviewed/Progressed HEP activities related to strengthening, flexibility, endurance, ROM of core, proximal hip and LE for functional self-care, mobility, lifting and ambulation/stair navigation   [] (26465)Reviewed/Progressed HEP activities related to improving balance, coordination, kinesthetic sense, posture, motor skill, proprioception of core, proximal hip and LE for self care, mobility, lifting, and ambulation/stair navigation      Manual Treatments:  PROM / STM / Oscillations-Mobs:  G-I, II, III, IV (PA's, Inf., Post.)  [x] (45416) Provided manual therapy to mobilize LE, proximal hip and/or LS spine soft tissue/joints for the purpose of modulating pain, promoting relaxation,  increasing ROM, reducing/eliminating soft tissue swelling/inflammation/restriction, improving soft tissue extensibility and allowing for proper ROM for normal function with self care, mobility, lifting and ambulation.             Modalities:     [] GAME READY (VASO)- for significant edema, swelling, pain control. - 15 minutes    Charges:  Timed Code Treatment Minutes: 55   Total Treatment Minutes: 55      [] EVAL (LOW) 72755 (typically 20 minutes face-to-face)  [] EVAL (MOD) 07748 (typically 30 minutes face-to-face)  [] EVAL (HIGH) 28105 (typically 45 minutes face-to-face)  [] RE-EVAL     [x] XB(28111) x 2    [] DRY NEEDLE 1 OR 2 MUSCLES  [] NMR (64744) x     [] DRY NEEDLE 3+ MUSCLES  [x] Manual (60431) x 2     [] TA (18809) x     [] [facility] (27499[facility]  [] ES(attended) (33972)     [] ES (un) (68148):   [] VASO (51975)  [] Other:      GOALS:  Patient stated goal: \"get my leg straight and be able to walk without crutch\"  []? Progressing: []? Met: [x]? Not Met: []? Adjusted     Therapist goals for Patient:   Short Term Goals: To be achieved in: 2 weeks  1. Independent in HEP and progression per patient tolerance, in order to prevent re-injury. []? Progressing: []? Met: [x]?  Not Met: []? Adjusted  2. Patient will have a decrease in pain to facilitate improvement in movement, function, and ADLs as indicated by Functional Deficits. []? Progressing: []? Met: [x]? Not Met: []? Adjusted     Long Term Goals: To be achieved in: 8 weeks  1. Disability index score of 10% or less for the LEFS to assist with reaching prior level of function. []? Progressing: []? Met: [x]? Not Met: []? Adjusted  2. Patient will demonstrate increased AROM to full L knee to allow for proper joint functioning as indicated by patients Functional Deficits. []? Progressing: []? Met: [x]? Not Met: []? Adjusted  3. Patient will demonstrate an increase in strength to good proximal hip strength and control, within 5lb HHD in LE to allow for proper functional mobility as indicated by patients Functional Deficits. []? Progressing: []? Met: [x]? Not Met: []? Adjusted  4. Patient will return to daily functional activities without increased symptoms or restriction. []? Progressing: []? Met: [x]? Not Met: []? Adjusted  5. Pt will be able to ambulate without crutch and with full knee extension and quad recruitment without increased symptoms or restrictions. []? Progressing: []? Met: [x]? Not Met: []? Adjusted      ASSESSMENT: Good tolerance to treatment. Appropriately fatigued at conclusion. A little more limited with ext today.       Return to Play: (if applicable)   [x]  Stage 1: Intro to Strength   []  Stage 2: Return to Run and Strength   []  Stage 3: Return to Jump and Strength   []  Stage 4: Dynamic Strength and Agility   []  Stage 5: Sport Specific Training     []  Ready to Return to Play, Meets All Above Stages   []  Not Ready for Return to Sports   Comments:            Treatment/Activity Tolerance:  [x] Patient tolerated treatment well [] Patient limited by fatique  [] Patient limited by pain  [] Patient limited by other medical complications  [] Other:     Overall Progression Towards Functional goals/ Treatment Progress Update:  [] Patient is progressing as expected towards functional goals listed. [x] Progression is slowed due to complexities/Impairments listed. [] Progression has been slowed due to co-morbidities. [] Plan just implemented, too soon to assess goals progression <30days   [] Goals require adjustment due to lack of progress  [] Patient is not progressing as expected and requires additional follow up with physician  [] Other    Prognosis for POC: [x] Good [] Fair  [] Poor    Patient requires continued skilled intervention: [x] Yes  [] No        PLAN: Continue frequency 3x/week w/ focus on manual therapy for restoring ROM. [x] Continue per plan of care [] Alter current plan (see comments)  [] Plan of care initiated [] Hold pending MD visit [] Discharge    Electronically signed by: Lois Lew PTA      Note: If patient does not return for scheduled/recommended follow up visits, this note will serve as a discharge from care along with the most recent update on progress.

## 2022-05-04 ENCOUNTER — HOSPITAL ENCOUNTER (OUTPATIENT)
Dept: PHYSICAL THERAPY | Age: 22
Setting detail: THERAPIES SERIES
Discharge: HOME | End: 2022-05-04
Payer: COMMERCIAL

## 2022-05-04 PROCEDURE — 97140 MANUAL THERAPY 1/> REGIONS: CPT

## 2022-05-04 PROCEDURE — 97110 THERAPEUTIC EXERCISES: CPT

## 2022-05-04 NOTE — FLOWSHEET NOTE
[place] [address] [address]  Phone: [phone] Fax: [phone]      Physical Therapy Treatment Note/ Progress Report:     Date:  2022    Patient Name:  Delicia Araujo    :  2000  MRN: [id number]  Restrictions/Precautions:    Medical/Treatment Diagnosis Information:  · Diagnosis: Z48.89 encounter for other specified surgical aftercare  · Treatment Diagnosis: M25.562, M25.662, M62.559, R53.1, K34.6  Insurance/Certification information:  PT Insurance Information: BCBS; DED NOT MET; DN NOT COVERED; 80/20%; 60 VISITS/YEAR, 7 USED  Physician Information:  Referring Practitioner: Samson Nunes  Plan of care signed (Y/N):     Date of Patient follow up with Physician:      Progress Report: []  Yes  [x]  No     Date Range for reporting period:  Beginnin2022  Ending:      Progress report due (10 Rx/or 30 days whichever is less):     Recertification due (POC duration/ or 90 days whichever is less): 22     Visit # Insurance Allowable Auth Needed   27 60 ( 7 used) []Yes   []No     Latex Allergy:  [x]NO      []YES  Preferred Language for Healthcare:   [x]English       []other:  Functional Scale: LEFS: 33/80  Date assessed: 2022    Pain level:  2/10     SUBJECTIVE: Patient reports that his knee feels a little tight today. OBJECTIVE:    Observation:      Test measurements:      2022: -3-120      RESTRICTIONS/PRECAUTIONS: s/p L ACLr w/ patellar tendon graft,  DOS: 2021 L knee scope with scar tissue debridement on 3/14/22.      Exercises/Interventions:     Therapeutic Ex (90693)  x20 min Sets/sec Reps Notes/CUES   Retro Stepper/BIKE 5 min     SAQ 7 deg lag over foam roll    LAQ 5s 10 Black strap to assist into full range+quad iso at top    Seated bag hang - 5# ankle weight   For L knee ext; HEP   Prone leg hang - 4# ankle weight   For L knee ext   Incline calf stretch 30s 5    RDL  15 Black KB L      Slider lunge  15    BOSU Lunge 10sec 10 QS at top   Prone TKEs      CC TKE 10s 10 3pl   SLR flexion 1 10 Cues to initiate with QS   Leg press - 2-1 ecc - 80# 2 10    Retro Slide lunge Cued to pull straight at top   Cybex hamstring curl 1 10 25lb, 2-1 eccentric focus+10 second stretch into extension between reps   Step up w/ TKE 2 10 Yellow, 6 in               Manual Intervention (91026)      Knee mobs/PROM  5 min End range ext/flexion w/OP   Tib/Fem Mobs  6 min Posteromedial, posterolateral; prone tibial for end range flexion   Patella Mobs  6 min Restricted superiorly/inferiorly   IASTM/STM  4 min Incisions, patella framing   Hip IR stretching    L         NMR Re-education (05851)    CUES NEEDED   Biofeedback w/CC TKE - 2 pl  8 min 10/10; 24 mA stim; 50 mA target   Sport cord march - yellow  2 10 Fwd/bwd; each direction   SLS balance - SC around back of knee cueing TKE - yellow 20s 5                                              Therapeutic Activity (27377)      Geni      Dynamic Balance                  DN + estim   L hamstring         Access Code: [email or web address]  URL: [email or web address]  Date: 03/28/2022  Prepared by: Forest Chi    Exercises  Prone Knee Extension Hang - 2-3 x daily - 7 x weekly - 1 sets - 5-10 min hold  Seated Knee Extension Stretch with Chair - 2-3 x daily - 7 x weekly - 1 sets - 5-10 min hold  Seated Table Hamstring Stretch - 2-3 x daily - 7 x weekly - 1 sets - 5 reps - 20s-30s hold  Long Sitting Quad Set - 2-3 x daily - 7 x weekly - 3 sets - 10 reps  Prone Terminal Knee Extension - 2-3 x daily - 7 x weekly - 1-2 sets - 10 reps - 10s hold  Supine Knee Flexion Wall Slide - 2-3 x daily - 7 x weekly - 1 sets - 10 reps - 10s hold      Therapeutic Exercise and NMR EXR  [x] (93840) Provided verbal/tactile cueing for activities related to strengthening, flexibility, endurance, ROM for improvements in LE, proximal hip, and core control with self care, mobility, lifting, ambulation. [x] (23194) Provided verbal/tactile cueing for activities related to improving balance, coordination, kinesthetic sense, posture, motor skill, proprioception  to assist with LE, proximal hip, and core control in self care, mobility, lifting, ambulation and eccentric single leg control. NMR and Therapeutic Activities:    [x] (06499 or 09380) Provided verbal/tactile cueing for activities related to improving balance, coordination, kinesthetic sense, posture, motor skill, proprioception and motor activation to allow for proper function of core, proximal hip and LE with self care and ADLs and functional mobility.   [] (16677) Gait Re-education- Provided training and instruction to the patient for proper LE, core and proximal hip recruitment and positioning and eccentric body weight control with ambulation re-education including up and down stairs     Home Exercise Program:    [x] (15005) Reviewed/Progressed HEP activities related to strengthening, flexibility, endurance, ROM of core, proximal hip and LE for functional self-care, mobility, lifting and ambulation/stair navigation   [] (57978)Reviewed/Progressed HEP activities related to improving balance, coordination, kinesthetic sense, posture, motor skill, proprioception of core, proximal hip and LE for self care, mobility, lifting, and ambulation/stair navigation      Manual Treatments:  PROM / STM / Oscillations-Mobs:  G-I, II, III, IV (PA's, Inf., Post.)  [x] (61212) Provided manual therapy to mobilize LE, proximal hip and/or LS spine soft tissue/joints for the purpose of modulating pain, promoting relaxation,  increasing ROM, reducing/eliminating soft tissue swelling/inflammation/restriction, improving soft tissue extensibility and allowing for proper ROM for normal function with self care, mobility, lifting and ambulation.             Modalities:     [] GAME READY (VASO)- for significant edema, swelling, pain control.  - 15 minutes    Charges:  Timed Code Treatment Minutes: 50   Total Treatment Minutes: 50      [] EVAL (LOW) 62023 (typically 20 minutes face-to-face)  [] EVAL (MOD) 54103 (typically 30 minutes face-to-face)  [] EVAL (HIGH) 32418 (typically 45 minutes face-to-face)  [] RE-EVAL     [x] MC(01927) x 2    [] DRY NEEDLE 1 OR 2 MUSCLES  [] NMR (12507) x     [] DRY NEEDLE 3+ MUSCLES  [x] Manual (79564) x 2     [] TA (82504) x     [] Mech Traction (42181)  [] ES(attended) (55028)     [] ES (un) (45445):   [] VASO (73301)  [] Other:      GOALS:  Patient stated goal: \"get my leg straight and be able to walk without crutch\"  []? Progressing: []? Met: [x]? Not Met: []? Adjusted     Therapist goals for Patient:   Short Term Goals: To be achieved in: 2 weeks  1. Independent in HEP and progression per patient tolerance, in order to prevent re-injury. []? Progressing: []? Met: [x]? Not Met: []? Adjusted  2. Patient will have a decrease in pain to facilitate improvement in movement, function, and ADLs as indicated by Functional Deficits. []? Progressing: []? Met: [x]? Not Met: []? Adjusted     Long Term Goals: To be achieved in: 8 weeks  1. Disability index score of 10% or less for the LEFS to assist with reaching prior level of function. []? Progressing: []? Met: [x]? Not Met: []? Adjusted  2. Patient will demonstrate increased AROM to full L knee to allow for proper joint functioning as indicated by patients Functional Deficits. []? Progressing: []? Met: [x]? Not Met: []? Adjusted  3. Patient will demonstrate an increase in strength to good proximal hip strength and control, within 5lb HHD in LE to allow for proper functional mobility as indicated by patients Functional Deficits. []? Progressing: []? Met: [x]? Not Met: []? Adjusted  4. Patient will return to daily functional activities without increased symptoms or restriction. []? Progressing: []? Met: [x]? Not Met: []? Adjusted  5.  Pt will be able to ambulate without crutch and with full knee extension and quad recruitment without increased symptoms or restrictions. []? Progressing: []? Met: [x]? Not Met: []? Adjusted      ASSESSMENT: Good tolerance to treatment. Appropriately fatigued at conclusion. A little more limited with ext today but has great quad recruitment, challenged w/ SL RDL. Return to Play: (if applicable)   [x]  Stage 1: Intro to Strength   []  Stage 2: Return to Run and Strength   []  Stage 3: Return to Jump and Strength   []  Stage 4: Dynamic Strength and Agility   []  Stage 5: Sport Specific Training     []  Ready to Return to Play, Meets All Above Stages   []  Not Ready for Return to Sports   Comments:            Treatment/Activity Tolerance:  [x] Patient tolerated treatment well [] Patient limited by fatique  [] Patient limited by pain  [] Patient limited by other medical complications  [] Other:     Overall Progression Towards Functional goals/ Treatment Progress Update:  [] Patient is progressing as expected towards functional goals listed. [x] Progression is slowed due to complexities/Impairments listed. [] Progression has been slowed due to co-morbidities. [] Plan just implemented, too soon to assess goals progression <30days   [] Goals require adjustment due to lack of progress  [] Patient is not progressing as expected and requires additional follow up with physician  [] Other    Prognosis for POC: [x] Good [] Fair  [] Poor    Patient requires continued skilled intervention: [x] Yes  [] No        PLAN: Continue frequency 3x/week w/ focus on manual therapy for restoring ROM. [x] Continue per plan of care [] Alter current plan (see comments)  [] Plan of care initiated [] Hold pending MD visit [] Discharge    Electronically signed by: Patel Crowe PT      Note: If patient does not return for scheduled/recommended follow up visits, this note will serve as a discharge from care along with the most recent update on progress.

## 2022-05-06 ENCOUNTER — HOSPITAL ENCOUNTER (OUTPATIENT)
Dept: PHYSICAL THERAPY | Age: 22
Setting detail: THERAPIES SERIES
Discharge: HOME | End: 2022-05-06
Payer: COMMERCIAL

## 2022-05-06 PROCEDURE — 97110 THERAPEUTIC EXERCISES: CPT

## 2022-05-06 PROCEDURE — 97140 MANUAL THERAPY 1/> REGIONS: CPT

## 2022-05-06 NOTE — FLOWSHEET NOTE
[place] [address] [facility]  Phone: [phone] Fax: [phone]      Physical Therapy Treatment Note/ Progress Report:     Date:  2022    Patient Name:  Chaitanya Jacques    :  2000  MRN: [id number]  Restrictions/Precautions:    Medical/Treatment Diagnosis Information:  · Diagnosis: Z48.89 encounter for other specified surgical aftercare  · Treatment Diagnosis: M25.562, M25.662, M62.559, R53.1, G45.8  Insurance/Certification information:  PT Insurance Information: BCBS; DED NOT MET; DN NOT COVERED; 80/20%; 60 VISITS/YEAR, 7 USED  Physician Information:  Referring Practitioner: Bhupinder Bhardwaj  Plan of care signed (Y/N):     Date of Patient follow up with Physician:      Progress Report: []  Yes  [x]  No     Date Range for reporting period:  Beginnin2022  Ending:      Progress report due (10 Rx/or 30 days whichever is less):     Recertification due (POC duration/ or 90 days whichever is less): 22     Visit # Insurance Allowable Auth Needed   28 60 ( 7 used) []Yes   []No     Latex Allergy:  [x]NO      []YES  Preferred Language for Healthcare:   [x]English       []other:  Functional Scale: LEFS: 33/80  Date assessed: 2022    Pain level:  2/10     SUBJECTIVE: Patient reports that his knee is doing well, states that he was able to jog up the stairs this morning and was able to loosen it up a little. OBJECTIVE:    Observation:      Test measurements:      2022: -3-120      RESTRICTIONS/PRECAUTIONS: s/p L ACLr w/ patellar tendon graft,  DOS: 2021 L knee scope with scar tissue debridement on 3/14/22.      Exercises/Interventions:     Therapeutic Ex (60567)  x20 min Sets/sec Reps Notes/CUES   Retro Stepper/BIKE 5 min     SAQ 7 deg lag over foam roll    LAQ 5s 10 Black strap to assist into full range+quad iso at top    Seated bag hang - 5# ankle weight   For L knee ext; HEP   Prone leg hang - 4# ankle weight   For L knee ext   Incline calf stretch 30s 5    RDL  15 Black KB   L      Slider lunge  15    BOSU Lunge 10sec 10 QS at top   Prone TKEs      CC TKE 10s 10 3pl   SLR flexion 1 10 Cues to initiate with QS   Leg press - 2-1 ecc - 80# 2 10    Retro Slide lunge Cued to pull straight at top   Cybex hamstring curl 1 10 25lb, 2-1 eccentric focus+10 second stretch into extension between reps   Step up w/ TKE 2 10 Yellow, 6 in               Manual Intervention (21768)      Knee mobs/PROM  5 min End range ext/flexion w/OP   Tib/Fem Mobs  6 min Posteromedial, posterolateral; prone tibial for end range flexion   Patella Mobs  6 min Restricted superiorly/inferiorly   IASTM/STM  4 min Incisions, patella framing   Hip IR stretching    L         NMR Re-education (21674)    CUES NEEDED   Biofeedback w/CC TKE - 2 pl  8 min 10/10; 24 mA stim; 50 mA target   Sport cord march - yellow  2 10 Fwd/bwd; each direction   SLS balance - SC around back of knee cueing TKE - yellow 20s 5                                              Therapeutic Activity (01730)      Jada Beautyos      Dynamic Balance                  DN + estim   L hamstring         Access Code: [email or web address]  URL: [email or web address]  Date: 03/28/2022  Prepared by: Angelita Arreola    Exercises  Prone Knee Extension Hang - 2-3 x daily - 7 x weekly - 1 sets - 5-10 min hold  Seated Knee Extension Stretch with Chair - 2-3 x daily - 7 x weekly - 1 sets - 5-10 min hold  Seated Table Hamstring Stretch - 2-3 x daily - 7 x weekly - 1 sets - 5 reps - 20s-30s hold  Long Sitting Quad Set - 2-3 x daily - 7 x weekly - 3 sets - 10 reps  Prone Terminal Knee Extension - 2-3 x daily - 7 x weekly - 1-2 sets - 10 reps - 10s hold  Supine Knee Flexion Wall Slide - 2-3 x daily - 7 x weekly - 1 sets - 10 reps - 10s hold      Therapeutic Exercise and NMR EXR  [x] (56758) Provided verbal/tactile cueing for activities related to strengthening, flexibility, endurance, ROM for improvements in LE, proximal hip, and core control with self care, mobility, lifting, ambulation. [x] (55238) Provided verbal/tactile cueing for activities related to improving balance, coordination, kinesthetic sense, posture, motor skill, proprioception  to assist with LE, proximal hip, and core control in self care, mobility, lifting, ambulation and eccentric single leg control.      NMR and Therapeutic Activities:    [x] (82708 or 03257) Provided verbal/tactile cueing for activities related to improving balance, coordination, kinesthetic sense, posture, motor skill, proprioception and motor activation to allow for proper function of core, proximal hip and LE with self care and ADLs and functional mobility.   [] (16368) Gait Re-education- Provided training and instruction to the patient for proper LE, core and proximal hip recruitment and positioning and eccentric body weight control with ambulation re-education including up and down stairs     Home Exercise Program:    [x] (95566) Reviewed/Progressed HEP activities related to strengthening, flexibility, endurance, ROM of core, proximal hip and LE for functional self-care, mobility, lifting and ambulation/stair navigation   [] (54401)Reviewed/Progressed HEP activities related to improving balance, coordination, kinesthetic sense, posture, motor skill, proprioception of core, proximal hip and LE for self care, mobility, lifting, and ambulation/stair navigation      Manual Treatments:  PROM / STM / Oscillations-Mobs:  G-I, II, III, IV (PA's, Inf., Post.)  [x] (98325) Provided manual therapy to mobilize LE, proximal hip and/or LS spine soft tissue/joints for the purpose of modulating pain, promoting relaxation,  increasing ROM, reducing/eliminating soft tissue swelling/inflammation/restriction, improving soft tissue extensibility and allowing for proper ROM for normal function with self care, mobility, lifting and ambulation.             Modalities:     [] GAME READY (VASO)- for significant edema, swelling, pain control. - 15 minutes    Charges:  Timed Code Treatment Minutes: 50   Total Treatment Minutes: 50      [] EVAL (LOW) 86646 (typically 20 minutes face-to-face)  [] EVAL (MOD) 67399 (typically 30 minutes face-to-face)  [] EVAL (HIGH) 42234 (typically 45 minutes face-to-face)  [] RE-EVAL     [x] QL(26020) x 2    [] DRY NEEDLE 1 OR 2 MUSCLES  [] NMR (15236) x     [] DRY NEEDLE 3+ MUSCLES  [x] Manual (35945) x 1     [] TA (34496) x     [] Mech Traction (47037)  [] ES(attended) (25066)     [] ES (un) (06429):   [] VASO (43478)  [] Other:      GOALS:  Patient stated goal: \"get my leg straight and be able to walk without crutch\"  []? Progressing: []? Met: [x]? Not Met: []? Adjusted     Therapist goals for Patient:   Short Term Goals: To be achieved in: 2 weeks  1. Independent in HEP and progression per patient tolerance, in order to prevent re-injury. []? Progressing: []? Met: [x]? Not Met: []? Adjusted  2. Patient will have a decrease in pain to facilitate improvement in movement, function, and ADLs as indicated by Functional Deficits. []? Progressing: []? Met: [x]? Not Met: []? Adjusted     Long Term Goals: To be achieved in: 8 weeks  1. Disability index score of 10% or less for the LEFS to assist with reaching prior level of function. []? Progressing: []? Met: [x]? Not Met: []? Adjusted  2. Patient will demonstrate increased AROM to full L knee to allow for proper joint functioning as indicated by patients Functional Deficits. []? Progressing: []? Met: [x]? Not Met: []? Adjusted  3. Patient will demonstrate an increase in strength to good proximal hip strength and control, within 5lb HHD in LE to allow for proper functional mobility as indicated by patients Functional Deficits. []? Progressing: []? Met: [x]? Not Met: []? Adjusted  4. Patient will return to daily functional activities without increased symptoms or restriction. []? Progressing: []? Met: [x]? Not Met: []? Adjusted  5. Pt will be able to ambulate without crutch and with full knee extension and quad recruitment without increased symptoms or restrictions. []? Progressing: []? Met: [x]? Not Met: []? Adjusted      ASSESSMENT: Good tolerance to treatment, able to progress exercises today. Appropriately fatigued at conclusion. Improved extension today but still lacking functionally. Return to Play: (if applicable)   [x]  Stage 1: Intro to Strength   []  Stage 2: Return to Run and Strength   []  Stage 3: Return to Jump and Strength   []  Stage 4: Dynamic Strength and Agility   []  Stage 5: Sport Specific Training     []  Ready to Return to Play, Meets All Above Stages   []  Not Ready for Return to Sports   Comments:            Treatment/Activity Tolerance:  [x] Patient tolerated treatment well [] Patient limited by fatique  [] Patient limited by pain  [] Patient limited by other medical complications  [] Other:     Overall Progression Towards Functional goals/ Treatment Progress Update:  [] Patient is progressing as expected towards functional goals listed. [x] Progression is slowed due to complexities/Impairments listed. [] Progression has been slowed due to co-morbidities. [] Plan just implemented, too soon to assess goals progression <30days   [] Goals require adjustment due to lack of progress  [] Patient is not progressing as expected and requires additional follow up with physician  [] Other    Prognosis for POC: [x] Good [] Fair  [] Poor    Patient requires continued skilled intervention: [x] Yes  [] No        PLAN: Continue frequency 3x/week w/ focus on manual therapy for restoring ROM.   [x] Continue per plan of care [] Alter current plan (see comments)  [] Plan of care initiated [] Hold pending MD visit [] Discharge    Electronically signed by: Akash Mendez PTA      Note: If patient does not return for scheduled/recommended follow up visits, this note will serve as a discharge from care along with the most recent update on progress.

## 2022-05-09 ENCOUNTER — HOSPITAL ENCOUNTER (OUTPATIENT)
Dept: PHYSICAL THERAPY | Age: 22
Setting detail: THERAPIES SERIES
Discharge: HOME | End: 2022-05-09
Payer: COMMERCIAL

## 2022-05-09 PROCEDURE — 97110 THERAPEUTIC EXERCISES: CPT

## 2022-05-09 PROCEDURE — 97140 MANUAL THERAPY 1/> REGIONS: CPT

## 2022-05-09 PROCEDURE — 97112 NEUROMUSCULAR REEDUCATION: CPT

## 2022-05-09 NOTE — FLOWSHEET NOTE
[place] [address] [facility]  Phone: [phone] Fax: [phone]      Physical Therapy Treatment Note/ Progress Report:     Date:  2022    Patient Name:  Clement Steele    :  2000  MRN: [id number]  Restrictions/Precautions:    Medical/Treatment Diagnosis Information:  · Diagnosis: Z48.89 encounter for other specified surgical aftercare  · Treatment Diagnosis: M25.562, M25.662, M62.559, R53.1, B53.6  Insurance/Certification information:  PT Insurance Information: BCBS; DED NOT MET; DN NOT COVERED; 80/20%; 60 VISITS/YEAR, 7 USED  Physician Information:  Referring Practitioner: Antelmo Reddy of care signed (Y/N):     Date of Patient follow up with Physician:      Progress Report: []  Yes  [x]  No     Date Range for reporting period:  Beginnin2022  Ending:      Progress report due (10 Rx/or 30 days whichever is less): 22 Progress note nv. Recertification due (POC duration/ or 90 days whichever is less): 22     Visit # Insurance Allowable Auth Needed   26 31 ( 7 used) []Yes   []No     Latex Allergy:  [x]NO      []YES  Preferred Language for Healthcare:   [x]English       []other:  Functional Scale: LEFS: 33/80  Date assessed: 2022    Pain level:  2/10     SUBJECTIVE: Patient reports that his knee is doing well, states that he was able to jog up the stairs with no discomfort or difficulty. Wondering when he can start jogging/running. OBJECTIVE:    Observation:      Test measurements:      2022: -3-120      RESTRICTIONS/PRECAUTIONS: s/p L ACLr w/ patellar tendon graft,  DOS: 2021 L knee scope with scar tissue debridement on 3/14/22.      Exercises/Interventions:     Therapeutic Ex (50916) Sets/sec Reps Notes/CUES   BIKE 5 min     SAQ 7 deg lag over foam roll    LAQ 5s 10 Black strap to assist into full range+quad iso at top    Seated bag hang - 5# ankle weight   For L knee ext; HEP   Prone leg hang - 4# ankle weight   For L knee ext   Incline calf stretch 30s 5          BFR @80% LOP - Green cuff  150 mmHg  8 min QS (5 sec on/5 sec off x10); SLR x15; Sidelying hip abd x15; LAQ x15; minisquats x15; standing hamstring curl x15         RDL  15 Black KB   Reverse lunge w/slider 2 10    BOSU Lunge 10sec 10 QS at top   Prone TKEs      CC TKE 10s 10 3pl   SLR flexion 1 10 Cues to initiate with QS   Leg press - 2-1 ecc - 80# 2 10    Retro Slide lunge Cued to pull straight at top   Cybex hamstring curl 1 10 25#, 2-1 eccentric focus+10 second stretch into extension between reps   Step up w/ TKE on CC - 2 pl 2 10 6 inch step   Lateral TB walk - minisquatted - blue loop around knees 3 15 feet          Manual Intervention (20019)      Knee mobs/PROM  6 min End range ext/flexion w/OP   Tib/Fem Mobs  6 min Posteromedial, posterolateral; prone tibial for end range flexion   Patella Mobs  6 min Restricted superiorly/inferiorly   IASTM/STM   Incisions, patella framing   Hip IR stretching    L         NMR Re-education (87348)    CUES NEEDED   Biofeedback w/CC TKE - 2 pl   10/10; 24 mA stim; 50 mA target   Sport cord march - yellow  2 10 Fwd/bwd; each direction   SLS balance - SC around back of knee cueing TKE - yellow 20s 5    SLS ball toss on airex - 4# med ball  3 directions 2 10 Each direction                                       Therapeutic Activity (48794)      Ladders      Plyos      Dynamic Balance                  DN + estim   L hamstring         Access Code: [email or web address]  URL: [email or web address]  Date: 03/28/2022  Prepared by: Angelita Arreola    Exercises  Prone Knee Extension Hang - 2-3 x daily - 7 x weekly - 1 sets - 5-10 min hold  Seated Knee Extension Stretch with Chair - 2-3 x daily - 7 x weekly - 1 sets - 5-10 min hold  Seated Table Hamstring Stretch - 2-3 x daily - 7 x weekly - 1 sets - 5 reps - 20s-30s hold  Long Sitting Quad Set - 2-3 x daily - 7 x weekly - 3 sets - 10 reps  Prone Terminal Knee Extension - 2-3 x daily - 7 x weekly - 1-2 sets - 10 reps - 10s hold  Supine Knee Flexion Wall Slide - 2-3 x daily - 7 x weekly - 1 sets - 10 reps - 10s hold    Bloodflow restriction was utilized throughout exercise session with use of Occlusion Cuff for a period of 8 minutes at 150 mmHg. The Cuff was deflated every 8 minutes for reperfusion during treatment. The patient was monitored for parathesia as well as poor tolerance throughout the treatment session. Therapeutic Exercise and NMR EXR  [x] (43060) Provided verbal/tactile cueing for activities related to strengthening, flexibility, endurance, ROM for improvements in LE, proximal hip, and core control with self care, mobility, lifting, ambulation. [x] (59473) Provided verbal/tactile cueing for activities related to improving balance, coordination, kinesthetic sense, posture, motor skill, proprioception  to assist with LE, proximal hip, and core control in self care, mobility, lifting, ambulation and eccentric single leg control.      NMR and Therapeutic Activities:    [x] (53537 or 92550) Provided verbal/tactile cueing for activities related to improving balance, coordination, kinesthetic sense, posture, motor skill, proprioception and motor activation to allow for proper function of core, proximal hip and LE with self care and ADLs and functional mobility.   [] (98513) Gait Re-education- Provided training and instruction to the patient for proper LE, core and proximal hip recruitment and positioning and eccentric body weight control with ambulation re-education including up and down stairs     Home Exercise Program:    [x] (13431) Reviewed/Progressed HEP activities related to strengthening, flexibility, endurance, ROM of core, proximal hip and LE for functional self-care, mobility, lifting and ambulation/stair navigation   [] (13874)Reviewed/Progressed HEP activities related to improving balance, coordination, kinesthetic sense, posture, motor skill, proprioception of core, proximal hip and LE for self care, mobility, lifting, and ambulation/stair navigation      Manual Treatments:  PROM / STM / Oscillations-Mobs:  G-I, II, III, IV (PA's, Inf., Post.)  [x] (38749) Provided manual therapy to mobilize LE, proximal hip and/or LS spine soft tissue/joints for the purpose of modulating pain, promoting relaxation,  increasing ROM, reducing/eliminating soft tissue swelling/inflammation/restriction, improving soft tissue extensibility and allowing for proper ROM for normal function with self care, mobility, lifting and ambulation.             Modalities:     [] GAME READY (VASO)- for significant edema, swelling, pain control. - 15 minutes    Charges:  Timed Code Treatment Minutes: 55   Total Treatment Minutes: 55      [] EVAL (LOW) 18555 (typically 20 minutes face-to-face)  [] EVAL (MOD) 04990 (typically 30 minutes face-to-face)  [] EVAL (HIGH) 10706 (typically 45 minutes face-to-face)  [] RE-EVAL     [x] TM(37028) x 2    [] DRY NEEDLE 1 OR 2 MUSCLES  [x] NMR (93435) x 1    [] DRY NEEDLE 3+ MUSCLES  [x] Manual (00646) x 1     [] TA (02577) x     [] Mech Traction (97819)  [] ES(attended) (78514)     [] ES (un) (52140):   [] VASO (94053)  [] Other:      GOALS:  Patient stated goal: \"get my leg straight and be able to walk without crutch\"  []? Progressing: []? Met: [x]? Not Met: []? Adjusted     Therapist goals for Patient:   Short Term Goals: To be achieved in: 2 weeks  1. Independent in HEP and progression per patient tolerance, in order to prevent re-injury. []? Progressing: []? Met: [x]? Not Met: []? Adjusted  2. Patient will have a decrease in pain to facilitate improvement in movement, function, and ADLs as indicated by Functional Deficits. []? Progressing: []? Met: [x]? Not Met: []? Adjusted     Long Term Goals: To be achieved in: 8 weeks  1. Disability index score of 10% or less for the LEFS to assist with reaching prior level of function.    []? Progressing: []? Met: [x]? Not Met: []? Adjusted  2. Patient will demonstrate increased AROM to full L knee to allow for proper joint functioning as indicated by patients Functional Deficits. []? Progressing: []? Met: [x]? Not Met: []? Adjusted  3. Patient will demonstrate an increase in strength to good proximal hip strength and control, within 5lb HHD in LE to allow for proper functional mobility as indicated by patients Functional Deficits. []? Progressing: []? Met: [x]? Not Met: []? Adjusted  4. Patient will return to daily functional activities without increased symptoms or restriction. []? Progressing: []? Met: [x]? Not Met: []? Adjusted  5. Pt will be able to ambulate without crutch and with full knee extension and quad recruitment without increased symptoms or restrictions. []? Progressing: []? Met: [x]? Not Met: []? Adjusted      ASSESSMENT: Overall, L knee extension consistently better than previous but still lacking functionally. Continued all manual techniques to address remaining L knee ROM/mobility limitations. Did initiate BFR today at 80% LOP since patient is maintaining L knee ROM/mobility better than previous and he really needs the strength progression that BFR can provide. BFR was a good challenge for patient. Plan on initiating 2 [facility],  [address] TM walk/jog at nv offloaded to 50-60% BW as trish.      Return to Play: (if applicable)   [x]  Stage 1: Intro to Strength   []  Stage 2: Return to Run and Strength   []  Stage 3: Return to Jump and Strength   []  Stage 4: Dynamic Strength and Agility   []  Stage 5: Sport Specific Training     []  Ready to Return to Play, Meets All Above Stages   []  Not Ready for Return to Sports   Comments:            Treatment/Activity Tolerance:  [x] Patient tolerated treatment well [] Patient limited by fatique  [] Patient limited by pain  [] Patient limited by other medical complications  [] Other:     Overall Progression Towards Functional goals/ Treatment Progress Update:  [] Patient is progressing as expected towards functional goals listed. [x] Progression is slowed due to complexities/Impairments listed. [] Progression has been slowed due to co-morbidities. [] Plan just implemented, too soon to assess goals progression <30days   [] Goals require adjustment due to lack of progress  [] Patient is not progressing as expected and requires additional follow up with physician  [] Other    Prognosis for POC: [x] Good [] Fair  [] Poor    Patient requires continued skilled intervention: [x] Yes  [] No        PLAN: Continue frequency 3x/week w/focus on ROM/mobility progression, strength, and functional progression  [x] Continue per plan of care [] Alter current plan (see comments)  [] Plan of care initiated [] Hold pending MD visit [] Discharge    Electronically signed by: Ricardo Major, PT, DPT, MS, SCS      Note: If patient does not return for scheduled/recommended follow up visits, this note will serve as a discharge from care along with the most recent update on progress.

## 2022-05-11 ENCOUNTER — HOSPITAL ENCOUNTER (OUTPATIENT)
Dept: PHYSICAL THERAPY | Age: 22
Setting detail: THERAPIES SERIES
Discharge: HOME | End: 2022-05-11
Payer: COMMERCIAL

## 2022-05-11 PROCEDURE — 97140 MANUAL THERAPY 1/> REGIONS: CPT

## 2022-05-11 PROCEDURE — 97110 THERAPEUTIC EXERCISES: CPT

## 2022-05-11 PROCEDURE — 97530 THERAPEUTIC ACTIVITIES: CPT

## 2022-05-11 NOTE — PLAN OF CARE
[place] [address] [place] [place] [address]  Phone: [phone] Fax: [phone]        Physical Therapy Re-Certification Plan of Care/MD UPDATE      Dear  Dayan Larson,    We had the pleasure of treating the following patient for physical therapy services at [address] [place] [address]. A summary of our findings can be found in the updated assessment below. This includes our plan of care. If you have any questions or concerns regarding these findings, please do not hesitate to contact me at the office phone number checked above.   Thank you for the referral.     Physician Signature:________________________________Date:__________________  By signing above (or electronic signature), therapists plan is approved by physician    Date Range Of Visits: 2022-2022  Total Visits to Date: 29  Overall Response to Treatment:   [x]Patient is responding well to treatment and improvement is noted with regards  to goals   []Patient should continue to improve in reasonable time if they continue HEP   []Patient has plateaued and is no longer responding to skilled PT intervention    []Patient is getting worse and would benefit from return to referring MD   []Patient unable to adhere to initial POC   [x]Other: continues to lack full extension and flexion end ranges, however pt has made great functional strength gains in the last few weeks and is able to better maintain available range w/ improved strength; pt needs to continue to improve functional ROM 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      Physical Therapy Treatment Note/ Progress Report:     Date:  2022    Patient Name:  Clara Grijalva    :  2000  MRN: [id number]  Restrictions/Precautions:    Medical/Treatment Diagnosis Information:  · Diagnosis: Z48.89 encounter for other specified surgical aftercare  · Treatment Diagnosis: M25.562, M25.662, M62.559, R53.1, H94.9  Insurance/Certification information:  PT Insurance Information: BCBS; DED NOT MET; DN NOT COVERED; 80/20%; 60 VISITS/YEAR, 7 USED  Physician Information:  Referring Practitioner: Nilo Raphael  Plan of care signed (Y/N):     Date of Patient follow up with Physician:      Progress Report: [x]  Yes  []  No     Date Range for reporting period:  Beginnin2022  Ending:      Progress report due (10 Rx/or 30 days whichever is less):     Recertification due (POC duration/ or 90 days whichever is less):     Visit # Insurance Allowable Auth Needed   21 75 ( 7 used) []Yes   []No     Latex Allergy:  [x]NO      []YES  Preferred Language for Healthcare:   [x]English       []other:  Functional Scale: LEFS: 50/80  Date assessed: 2022    Pain level:  210     SUBJECTIVE: Patient reports that his knee is doing well, anxious to start running on [place] today. OBJECTIVE:    Observation:      Test measurements:      2022: -3-122      RESTRICTIONS/PRECAUTIONS: s/p L ACLr w/ patellar tendon graft,  DOS: 2021 L knee scope with scar tissue debridement on 3/14/22.      Exercises/Interventions:     Therapeutic Ex (86798) Sets/sec Reps Notes/CUES   BIKE 5 min     SAQ 7 deg lag over foam roll    LAQ 5s 10 Black strap to assist into full range+quad iso at top    Seated bag hang - 5# ankle weight   For L knee ext; HEP   Prone leg hang - 4# ankle weight   For L knee ext   Incline calf stretch 30s 5          BFR @80% LOP - Green cuff  150 mmHg  8 min QS (5 sec on/5 sec off x10); SLR x15; Sidelying hip abd x15; LAQ x15; minisquats x15; standing hamstring curl x15         RDL  15 Black KB   Reverse lunge w/slider 2 10    BOSU Lunge 10sec 10 QS at top   Prone TKEs      CC TKE 10s 10 3pl   SLR flexion 1 10 Cues to initiate with QS   Leg press - 2-1 ecc - 80# 2 10    Retro Slide lunge Cued to pull straight at top   Cybex hamstring curl 1 10 25#, 2-1 eccentric focus+10 second stretch into extension between reps   Step up w/ TKE on CC - 2 pl 2 10 6 inch step   Lateral TB walk - minisquatted - blue loop around knees 3 15 feet          Manual Intervention (73383)-12      Knee mobs/PROM  4 min End range ext/flexion w/OP   Tib/Fem Mobs  4 min Posteromedial, posterolateral; prone tibial for end range flexion   Patella Mobs  4 min Restricted superiorly/inferiorly   IASTM/STM   Incisions, patella framing   Hip IR stretching    L         NMR Re-education (12851)    CUES NEEDED   Biofeedback w/CC TKE - 2 pl   10/10; 24 mA stim; 50 mA target   Sport cord march - yellow  2 10 Fwd/bwd; each direction   SLS balance - SC around back of knee cueing TKE - yellow 20s 5    SLS ball toss on airex - 4# med ball  3 directions 2 10 Each direction                                       Therapeutic Activity (87894)- 10      Ladders      Plyos      Dynamic Balance      Alter G 10  1:1 x5; XS shorts, 60% WB         DN + estim   L hamstring         Access Code: [email or web address]  URL: [email or web address]  Date: 03/28/2022  Prepared by: Tony Sheets    Exercises  Prone Knee Extension Hang - 2-3 x daily - 7 x weekly - 1 sets - 5-10 min hold  Seated Knee Extension Stretch with Chair - 2-3 x daily - 7 x weekly - 1 sets - 5-10 min hold  Seated Table Hamstring Stretch - 2-3 x daily - 7 x weekly - 1 sets - 5 reps - 20s-30s hold  Long Sitting Quad Set - 2-3 x daily - 7 x weekly - 3 sets - 10 reps  Prone Terminal Knee Extension - 2-3 x daily - 7 x weekly - 1-2 sets - 10 reps - 10s hold  Supine Knee Flexion Wall Slide - 2-3 x daily - 7 x weekly - 1 sets - 10 reps - 10s hold    Bloodflow restriction was utilized throughout exercise session with use of Occlusion Cuff for a period of 8 minutes at 150 mmHg. The Cuff was deflated every 8 minutes for reperfusion during treatment. The patient was monitored for parathesia as well as poor tolerance throughout the treatment session.      Therapeutic Exercise and NMR EXR  [x] (23494) Provided verbal/tactile cueing for activities related to strengthening, flexibility, endurance, ROM for improvements in LE, proximal hip, and core control with self care, mobility, lifting, ambulation. [x] (23317) Provided verbal/tactile cueing for activities related to improving balance, coordination, kinesthetic sense, posture, motor skill, proprioception  to assist with LE, proximal hip, and core control in self care, mobility, lifting, ambulation and eccentric single leg control.      NMR and Therapeutic Activities:    [x] (84949 or 97192) Provided verbal/tactile cueing for activities related to improving balance, coordination, kinesthetic sense, posture, motor skill, proprioception and motor activation to allow for proper function of core, proximal hip and LE with self care and ADLs and functional mobility.   [] (27472) Gait Re-education- Provided training and instruction to the patient for proper LE, core and proximal hip recruitment and positioning and eccentric body weight control with ambulation re-education including up and down stairs     Home Exercise Program:    [x] (18747) Reviewed/Progressed HEP activities related to strengthening, flexibility, endurance, ROM of core, proximal hip and LE for functional self-care, mobility, lifting and ambulation/stair navigation   [] (24099)Reviewed/Progressed HEP activities related to improving balance, coordination, kinesthetic sense, posture, motor skill, proprioception of core, proximal hip and LE for self care, mobility, lifting, and ambulation/stair navigation      Manual Treatments:  PROM / STM / Oscillations-Mobs:  G-I, II, III, IV (PA's, Inf., Post.)  [x] (91794) Provided manual therapy to mobilize LE, proximal hip and/or LS spine soft tissue/joints for the purpose of modulating pain, promoting relaxation,  increasing ROM, reducing/eliminating soft tissue swelling/inflammation/restriction, improving soft tissue extensibility and allowing for proper ROM for normal function with self care, mobility, lifting and ambulation.             Modalities:     [] GAME READY (VASO)- for significant edema, swelling, pain control. - 15 minutes    Charges:  Timed Code Treatment Minutes: 55   Total Treatment Minutes: 55      [] EVAL (LOW) 76215 (typically 20 minutes face-to-face)  [] EVAL (MOD) 88605 (typically 30 minutes face-to-face)  [] EVAL (HIGH) 50810 (typically 45 minutes face-to-face)  [] RE-EVAL     [x] RM(40568) x 2    [] DRY NEEDLE 1 OR 2 MUSCLES  [] NMR (60764) x    [] DRY NEEDLE 3+ MUSCLES  [x] Manual (80632) x 1     [x] TA (33443) x   1  [] Mech Traction (71954)  [] ES(attended) (61979)     [] ES (un) (20491):   [] VASO (70296)  [] Other:      GOALS:  Patient stated goal: \"get my leg straight and be able to walk without crutch\"  []? Progressing: []? Met: [x]? Not Met: []? Adjusted     Therapist goals for Patient:   Short Term Goals: To be achieved in: 2 weeks  1. Independent in HEP and progression per patient tolerance, in order to prevent re-injury. []? Progressing: [x]? Met: []? Not Met: []? Adjusted  2. Patient will have a decrease in pain to facilitate improvement in movement, function, and ADLs as indicated by Functional Deficits. [x]? Progressing: []? Met: []? Not Met: []? Adjusted     Long Term Goals: To be achieved in: 8 weeks  1. Disability index score of 10% or less for the LEFS to assist with reaching prior level of function. [x]? Progressing: []? Met: []? Not Met: []? Adjusted  2. Patient will demonstrate increased AROM to full L knee to allow for proper joint functioning as indicated by patients Functional Deficits. []? Progressing: []? Met: [x]? Not Met: []? Adjusted  3. Patient will demonstrate an increase in strength to good proximal hip strength and control, within 5lb HHD in LE to allow for proper functional mobility as indicated by patients Functional Deficits. [x]? Progressing: []? Met: []? Not Met: []? Adjusted  4.  Patient will return to daily functional activities without increased symptoms or restriction. []? Progressing: [x]? Met: []? Not Met: []? Adjusted  5. Pt will be able to ambulate without crutch and with full knee extension and quad recruitment without increased symptoms or restrictions. [x]? Progressing: []? Met: []? Not Met: []? Adjusted      ASSESSMENT: Overall, L knee extension consistently better than previous but still lacking functionally. Continued all manual techniques to address remaining L knee ROM/mobility limitations. Did continue BFR today at 80% LOP since patient is maintaining L knee ROM/mobility better than previous and he really needs the strength progression that BFR can provide. BFR was a good challenge for patient. Initiated Smooth [place] today at 60% BW w/ excellent tolerance and no obvious gait abnormalities. Return to Play: (if applicable)   [x]  Stage 1: Intro to Strength   []  Stage 2: Return to Run and Strength   []  Stage 3: Return to Jump and Strength   []  Stage 4: Dynamic Strength and Agility   []  Stage 5: Sport Specific Training     []  Ready to Return to Play, Meets All Above Stages   []  Not Ready for Return to Sports   Comments:            Treatment/Activity Tolerance:  [x] Patient tolerated treatment well [] Patient limited by fatique  [] Patient limited by pain  [] Patient limited by other medical complications  [] Other:     Overall Progression Towards Functional goals/ Treatment Progress Update:  [] Patient is progressing as expected towards functional goals listed. [x] Progression is slowed due to complexities/Impairments listed. [] Progression has been slowed due to co-morbidities.   [] Plan just implemented, too soon to assess goals progression <30days   [] Goals require adjustment due to lack of progress  [] Patient is not progressing as expected and requires additional follow up with physician  [] Other    Prognosis for POC: [x] Good [] Fair  [] Poor    Patient requires continued skilled intervention: [x] Yes  [] No        PLAN: Continue frequency 3x/week w/focus on ROM/mobility progression, strength, and functional progression  [x] Continue per plan of care [] Alter current plan (see comments)  [] Plan of care initiated [] Hold pending MD visit [] Discharge    Electronically signed by: Pablo Keene PT, DPT      Note: If patient does not return for scheduled/recommended follow up visits, this note will serve as a discharge from care along with the most recent update on progress.

## 2022-05-13 ENCOUNTER — HOSPITAL ENCOUNTER (OUTPATIENT)
Dept: PHYSICAL THERAPY | Age: 22
Setting detail: THERAPIES SERIES
Discharge: HOME | End: 2022-05-13
Payer: COMMERCIAL

## 2022-05-13 PROCEDURE — 97110 THERAPEUTIC EXERCISES: CPT

## 2022-05-13 PROCEDURE — 97530 THERAPEUTIC ACTIVITIES: CPT

## 2022-05-13 PROCEDURE — 97140 MANUAL THERAPY 1/> REGIONS: CPT

## 2022-05-13 NOTE — DISCHARGE SUMMARY
[address]  Phone: [phone] Fax: [phone]    Physical Therapy Treatment Note/ Progress Report:     Date:  2022    Patient Name:  Shana Stallings    :  2000  MRN: [id number]  Restrictions/Precautions:    Medical/Treatment Diagnosis Information:  · Diagnosis: Z48.89 encounter for other specified surgical aftercare  · Treatment Diagnosis: M25.562, M25.662, M62.559, R53.1, Y53.5  Insurance/Certification information:  PT Insurance Information: BCBS; DED NOT MET; DN NOT COVERED; 80/20%; 60 VISITS/YEAR, 7 USED  Physician Information:  Referring Practitioner: Sam Ibrahim of care signed (Y/N):     Date of Patient follow up with Physician:      Progress Report: []  Yes  [x]  No     Date Range for reporting period:  Beginnin2022  Ending:      Progress report due (10 Rx/or 30 days whichever is less):     Recertification due (POC duration/ or 90 days whichever is less):     Visit # Insurance Allowable Auth Needed   10 54 ( 7 used) []Yes   []No     Latex Allergy:  [x]NO      []YES  Preferred Language for Healthcare:   [x]English       []other:  Functional Scale: LEFS: 50/80  Date assessed: 2022    Pain level:  2/10     SUBJECTIVE: Going home to [facility] after today's PT session. Will continue to attend PT at home over the summer. OBJECTIVE:    Observation:      Test measurements:      2022: -3-122      RESTRICTIONS/PRECAUTIONS: s/p L ACLr w/ patellar tendon graft,  DOS: 2021 L knee scope with scar tissue debridement on 3/14/22.      Exercises/Interventions:     Therapeutic Ex (77639) Sets/sec Reps Notes/CUES   BIKE 5 min     SAQ 7 deg lag over foam roll    LAQ 5s 10 Black strap to assist into full range+quad iso at top    Seated bag hang - 5# ankle weight   For L knee ext; HEP   Prone leg hang - 4# ankle weight   For L knee ext   Incline calf stretch 30s 5          BFR @80% LOP - Green cuff  175 mmHg  8 min QS (5 sec on/5 sec off x10); SLR x15; Sidelying hip abd x15; LAQ x15; minisquats x15; standing hamstring curl x15         RDL  15 Black KB   Reverse lunge w/slider 2 10    BOSU Lunge 10sec 10 QS at top   Prone TKEs      CC TKE 10s 10 3pl   SLR flexion 1 10 Cues to initiate with QS   Leg press - 2-1 ecc - 85# 2 10    Retro Slide lunge Cued to pull straight at top   Cybex hamstring curl 1 10 25#, 2-1 eccentric focus+10 second stretch into extension between reps   Step up w/ TKE on CC - 2 pl 2 10 6 inch step   Lateral TB walk - minisquatted - blue loop around knees 3 15 feet          Manual Intervention (66754)      Knee mobs/PROM  6 min End range ext/flexion w/OP   Tib/Fem Mobs  7 min Posteromedial, posterolateral; prone tibial for end range flexion   Patella Mobs  7 min Restricted superiorly/inferiorly   IASTM/STM   Incisions, patella framing   Hip IR stretching    L         NMR Re-education (14134)    CUES NEEDED   Biofeedback w/CC TKE - 2 pl   10/10; 24 mA stim; 50 mA target   Sport cord march - yellow  2 10 Fwd/bwd; each direction   SLS balance - SC around back of knee cueing TKE - yellow 20s 5    SLS ball toss on airex - 4# med ball  3 directions 2 10 Each direction                                       Therapeutic Activity (87576)      Ladders      Plyos      Dynamic Balance      Alter G TM walk/jog  10 min 1:1 x5; XS shorts, 60% WB         DN + estim   L hamstring         Access Code: [email or web address]  URL: [email or web address]  Date: 03/28/2022  Prepared by: Kathi Shanks    Exercises  Prone Knee Extension Hang - 2-3 x daily - 7 x weekly - 1 sets - 5-10 min hold  Seated Knee Extension Stretch with Chair - 2-3 x daily - 7 x weekly - 1 sets - 5-10 min hold  Seated Table Hamstring Stretch - 2-3 x daily - 7 x weekly - 1 sets - 5 reps - 20s-30s hold  Long Sitting Quad Set - 2-3 x daily - 7 x weekly - 3 sets - 10 reps  Prone Terminal Knee Extension - 2-3 x daily - 7 x weekly - 1-2 sets - 10 reps - 10s hold  Supine Knee Flexion Wall Slide - 2-3 x daily - 7 x weekly - 1 sets - 10 reps - 10s hold    Bloodflow restriction was utilized throughout exercise session with use of Occlusion Cuff for a period of 8 minutes at 150 mmHg. The Cuff was deflated every 8 minutes for reperfusion during treatment. The patient was monitored for parathesia as well as poor tolerance throughout the treatment session. Therapeutic Exercise and NMR EXR  [x] (83054) Provided verbal/tactile cueing for activities related to strengthening, flexibility, endurance, ROM for improvements in LE, proximal hip, and core control with self care, mobility, lifting, ambulation. [x] (50314) Provided verbal/tactile cueing for activities related to improving balance, coordination, kinesthetic sense, posture, motor skill, proprioception  to assist with LE, proximal hip, and core control in self care, mobility, lifting, ambulation and eccentric single leg control.      NMR and Therapeutic Activities:    [x] (44585 or 89256) Provided verbal/tactile cueing for activities related to improving balance, coordination, kinesthetic sense, posture, motor skill, proprioception and motor activation to allow for proper function of core, proximal hip and LE with self care and ADLs and functional mobility.   [] (29523) Gait Re-education- Provided training and instruction to the patient for proper LE, core and proximal hip recruitment and positioning and eccentric body weight control with ambulation re-education including up and down stairs     Home Exercise Program:    [x] (67861) Reviewed/Progressed HEP activities related to strengthening, flexibility, endurance, ROM of core, proximal hip and LE for functional self-care, mobility, lifting and ambulation/stair navigation   [] (73444)Reviewed/Progressed HEP activities related to improving balance, coordination, kinesthetic sense, posture, motor skill, proprioception of core, proximal hip and LE for self care, mobility, lifting, and ambulation/stair navigation      Manual Treatments:  PROM / STM / Oscillations-Mobs:  G-I, II, III, IV (PA's, Inf., Post.)  [x] (87811) Provided manual therapy to mobilize LE, proximal hip and/or LS spine soft tissue/joints for the purpose of modulating pain, promoting relaxation,  increasing ROM, reducing/eliminating soft tissue swelling/inflammation/restriction, improving soft tissue extensibility and allowing for proper ROM for normal function with self care, mobility, lifting and ambulation.             Modalities:     [] GAME READY (VASO)- for significant edema, swelling, pain control. - 15 minutes    Charges:  Timed Code Treatment Minutes: 62   Total Treatment Minutes: 62      [] EVAL (LOW) 66444 (typically 20 minutes face-to-face)  [] EVAL (MOD) 31885 (typically 30 minutes face-to-face)  [] EVAL (HIGH) 72877 (typically 45 minutes face-to-face)  [] RE-EVAL     [x] IW(08186) x 2    [] DRY NEEDLE 1 OR 2 MUSCLES  [] NMR (60447) x    [] DRY NEEDLE 3+ MUSCLES  [x] Manual (49445) x 1     [x] TA (78221) x   1  [] Mech Traction (01763)  [] ES(attended) (00090)     [] ES (un) (14958):   [] VASO (97007)  [] Other:      GOALS:  Patient stated goal: \"get my leg straight and be able to walk without crutch\"  []? Progressing: []? Met: [x]? Not Met: []? Adjusted     Therapist goals for Patient:   Short Term Goals: To be achieved in: 2 weeks  1. Independent in HEP and progression per patient tolerance, in order to prevent re-injury. []? Progressing: [x]? Met: []? Not Met: []? Adjusted  2. Patient will have a decrease in pain to facilitate improvement in movement, function, and ADLs as indicated by Functional Deficits. [x]? Progressing: []? Met: []? Not Met: []? Adjusted     Long Term Goals: To be achieved in: 8 weeks  1. Disability index score of 10% or less for the LEFS to assist with reaching prior level of function. [x]? Progressing: []? Met: []? Not Met: []? Adjusted  2.  Patient will demonstrate increased AROM to full L knee to allow for proper joint functioning as indicated by patients Functional Deficits. []? Progressing: []? Met: [x]? Not Met: []? Adjusted  3. Patient will demonstrate an increase in strength to good proximal hip strength and control, within 5lb HHD in LE to allow for proper functional mobility as indicated by patients Functional Deficits. [x]? Progressing: []? Met: []? Not Met: []? Adjusted  4. Patient will return to daily functional activities without increased symptoms or restriction. []? Progressing: [x]? Met: []? Not Met: []? Adjusted  5. Pt will be able to ambulate without crutch and with full knee extension and quad recruitment without increased symptoms or restrictions. [x]? Progressing: []? Met: []? Not Met: []? Adjusted      ASSESSMENT: Overall, L knee extension consistently better than previous, but still lacking functionally. Session focus a mix of ROM/mobility<strength progression. Continued all manual techniques to address remaining L knee ROM/mobility limitations. Patient is maintaining L knee ROM/mobility better than previous and he really needs strength progression at this time, so continued BFR for further quad strength advancement. BFR still a considerable challenge for patient. Continued Alter G TM walk/jog at 60% BW with no advancement in BW or time as this provided some good muscle fatigue following initiation last visit. Patient to go home to [facility] after today's session. Will continue PT in [facility] over the summer.      Return to Play: (if applicable)   [x]  Stage 1: Intro to Strength   []  Stage 2: Return to Run and Strength   []  Stage 3: Return to Jump and Strength   []  Stage 4: Dynamic Strength and Agility   []  Stage 5: Sport Specific Training     []  Ready to Return to Play, Meets All Above Stages   []  Not Ready for Return to Sports   Comments:            Treatment/Activity Tolerance:  [x] Patient tolerated treatment well [] Patient limited by fatique  [] Patient limited by pain  [] Patient limited by other medical complications  [] Other:     Overall Progression Towards Functional goals/ Treatment Progress Update:  [] Patient is progressing as expected towards functional goals listed. [x] Progression is slowed due to complexities/Impairments listed. [] Progression has been slowed due to co-morbidities. [] Plan just implemented, too soon to assess goals progression <30days   [] Goals require adjustment due to lack of progress  [] Patient is not progressing as expected and requires additional follow up with physician  [] Other    Prognosis for POC: [x] Good [] Fair  [] Poor    Patient requires continued skilled intervention: [x] Yes  [] No        PLAN: Continue frequency 3x/week w/focus on ROM/mobility progression, strength, and functional progression  [x] Continue per plan of care [] Alter current plan (see comments)  [] Plan of care initiated [] Hold pending MD visit [] Discharge    Electronically signed by: Heydi Hill, PT, DPT, MS, SCS      Note: If patient does not return for scheduled/recommended follow up visits, this note will serve as a discharge from care along with the most recent update on progress.

## 2022-08-31 ENCOUNTER — HOSPITAL ENCOUNTER (OUTPATIENT)
Dept: PHYSICAL THERAPY | Age: 22
Setting detail: THERAPIES SERIES
Discharge: HOME | End: 2022-08-31
Payer: COMMERCIAL

## 2022-08-31 PROCEDURE — 97530 THERAPEUTIC ACTIVITIES: CPT

## 2022-08-31 PROCEDURE — 97140 MANUAL THERAPY 1/> REGIONS: CPT

## 2022-08-31 PROCEDURE — 97110 THERAPEUTIC EXERCISES: CPT

## 2022-08-31 NOTE — PLAN OF CARE
[address]  Phone: [phone] Fax: [phone]        Physical Therapy Re-Certification Plan of Care/MD UPDATE      Dear  Sarina Antunez,    We had the pleasure of treating the following patient for physical therapy services at [address] [place] [address]. A summary of our findings can be found in the updated assessment below. This includes our plan of care. If you have any questions or concerns regarding these findings, please do not hesitate to contact me at the office phone number checked above.   Thank you for the referral.     Physician Signature:________________________________Date:__________________  By signing above (or electronic signature), therapists plan is approved by physician    Date Range Of Visits: 2022-2022  Total Visits to Date: 39  Overall Response to Treatment:   [x]Patient is responding well to treatment and improvement is noted with regards  to goals   []Patient should continue to improve in reasonable time if they continue HEP   []Patient has plateaued and is no longer responding to skilled PT intervention    []Patient is getting worse and would benefit from return to referring MD   []Patient unable to adhere to initial POC   [x]Other: returns to campus for continued ACLr return to play;       Physical Therapy Treatment Note/ Progress Report:     Date:  2022    Patient Name:  Delfina Winslow    :  2000  MRN: [id number]  Restrictions/Precautions:    Medical/Treatment Diagnosis Information:  Diagnosis: Z48.89 encounter for other specified surgical aftercare  Treatment Diagnosis: M25.562, M25.662, M62.559, R53.1, X68.3  Insurance/Certification information:  PT Insurance Information: BCBS; DED NOT MET; DN NOT COVERED; 80/20%; 60 VISITS/YEAR, 7 USED  Physician Information:  Referring Practitioner: Triston Echols of care signed (Y/N):     Date of Patient Hip IR stretching    L         NMR Re-education (03178)    CUES NEEDED   Biofeedback w/CC TKE - 2 pl  10/10; 24 mA stim; 50 mA target   Sport cord march - yellow  2 10 Fwd/bwd; each direction   SLS balance - SC around back of knee cueing TKE - yellow 20s 5    SLS ball toss on airex - 4# med ball  3 directions 2 10 Each direction               Therapeutic Activity (34807)- 9      Ladders      Plyos      Dynamic Balance      Alter G   1:1 x5; XS shorts, 60% WB   SL STS 4'     Y balance testing 5'        Access Code: [email or web address]  URL: [email or web address]  Date: 03/28/2022  Prepared by: Peter Bars    Exercises  Prone Knee Extension Hang - 2-3 x daily - 7 x weekly - 1 sets - 5-10 min hold  Seated Knee Extension Stretch with Chair - 2-3 x daily - 7 x weekly - 1 sets - 5-10 min hold  Seated Table Hamstring Stretch - 2-3 x daily - 7 x weekly - 1 sets - 5 reps - 20s-30s hold  Long Sitting Quad Set - 2-3 x daily - 7 x weekly - 3 sets - 10 reps  Prone Terminal Knee Extension - 2-3 x daily - 7 x weekly - 1-2 sets - 10 reps - 10s hold  Supine Knee Flexion Wall Slide - 2-3 x daily - 7 x weekly - 1 sets - 10 reps - 10s hold    Bloodflow restriction was utilized throughout exercise session with use of Occlusion Cuff for a period of 8 minutes at 150 mmHg. The Cuff was deflated every 8 minutes for reperfusion during treatment. The patient was monitored for parathesia as well as poor tolerance throughout the treatment session. Therapeutic Exercise and NMR EXR  [x] (00004) Provided verbal/tactile cueing for activities related to strengthening, flexibility, endurance, ROM for improvements in LE, proximal hip, and core control with self care, mobility, lifting, ambulation.   [x] (86995) Provided verbal/tactile cueing for activities related to improving balance, coordination, kinesthetic sense, posture, motor skill, proprioception  to assist with LE, proximal hip, and core control in self care, mobility, lifting, ambulation and eccentric single leg control. NMR and Therapeutic Activities:    [x] (21905 or 92927) Provided verbal/tactile cueing for activities related to improving balance, coordination, kinesthetic sense, posture, motor skill, proprioception and motor activation to allow for proper function of core, proximal hip and LE with self care and ADLs and functional mobility.   [] (17264) Gait Re-education- Provided training and instruction to the patient for proper LE, core and proximal hip recruitment and positioning and eccentric body weight control with ambulation re-education including up and down stairs     Home Exercise Program:    [x] (57028) Reviewed/Progressed HEP activities related to strengthening, flexibility, endurance, ROM of core, proximal hip and LE for functional self-care, mobility, lifting and ambulation/stair navigation   [] (62589)Reviewed/Progressed HEP activities related to improving balance, coordination, kinesthetic sense, posture, motor skill, proprioception of core, proximal hip and LE for self care, mobility, lifting, and ambulation/stair navigation      Manual Treatments:  PROM / STM / Oscillations-Mobs:  G-I, II, III, IV (PA's, Inf., Post.)  [x] (48757) Provided manual therapy to mobilize LE, proximal hip and/or LS spine soft tissue/joints for the purpose of modulating pain, promoting relaxation,  increasing ROM, reducing/eliminating soft tissue swelling/inflammation/restriction, improving soft tissue extensibility and allowing for proper ROM for normal function with self care, mobility, lifting and ambulation. Modalities:     [] GAME READY (VASO)- for significant edema, swelling, pain control.  - 15 minutes    Charges:  Timed Code Treatment Minutes: 41   Total Treatment Minutes: 50      [] EVAL (LOW) 80450 (typically 20 minutes face-to-face)  [] EVAL (MOD) 24350 (typically 30 minutes face-to-face)  [] EVAL (HIGH) 72637 (typically 45 minutes face-to-face)  [] RE-EVAL     [x] BF(51930) x 1    [] DRY NEEDLE 1 OR 2 MUSCLES  [] NMR (55814) x    [] DRY NEEDLE 3+ MUSCLES  [x] Manual (42543) x 1     [x] TA (93799) x   1  [] Mech Traction (43048)  [] ES(attended) (77386)     [] ES (un) (82902):   [] VASO (52999)  [] Other:      GOALS:  Patient stated goal: \"get my leg straight and be able to walk without crutch\"  [] Progressing: [] Met: [x] Not Met: [] Adjusted     Therapist goals for Patient:   Short Term Goals: To be achieved in: 2 weeks  1. Independent in HEP and progression per patient tolerance, in order to prevent re-injury. [] Progressing: [x] Met: [] Not Met: [] Adjusted  2. Patient will have a decrease in pain to facilitate improvement in movement, function, and ADLs as indicated by Functional Deficits. [x] Progressing: [] Met: [] Not Met: [] Adjusted     Long Term Goals: To be achieved in: 8 weeks  1. Disability index score of 10% or less for the LEFS to assist with reaching prior level of function. [x] Progressing: [] Met: [] Not Met: [] Adjusted  2. Patient will demonstrate increased AROM to full L knee to allow for proper joint functioning as indicated by patients Functional Deficits. [] Progressing: [] Met: [x] Not Met: [] Adjusted  3. Patient will demonstrate an increase in strength to good proximal hip strength and control, within 5lb HHD in LE to allow for proper functional mobility as indicated by patients Functional Deficits. [x] Progressing: [] Met: [] Not Met: [] Adjusted  4. Patient will return to daily functional activities without increased symptoms or restriction. [] Progressing: [x] Met: [] Not Met: [] Adjusted  5. Pt will be able to demonstrate equal strength and endurance from R to L leg with good eccentric control. [x] Progressing: [] Met: [] Not Met: [] Adjusted      ASSESSMENT: Pt still lacking some end range mobility through patella and posteromedial corner. Still has fairly normal strength and endurance deficits through surgical leg.  Would benefit from biodex testing for further strength analysis in comparison the position and body weight to assist in training for sport specific return to play. Needs cues throughout for eccentric control and use of posterior chain. Return to Play: (if applicable)   [x]  Stage 1: Intro to Strength   []  Stage 2: Return to Run and Strength   []  Stage 3: Return to Jump and Strength   []  Stage 4: Dynamic Strength and Agility   []  Stage 5: Sport Specific Training     []  Ready to Return to Play, Meets All Above Stages   []  Not Ready for Return to Sports   Comments:            Treatment/Activity Tolerance:  [x] Patient tolerated treatment well [] Patient limited by fatique  [] Patient limited by pain  [] Patient limited by other medical complications  [] Other:     Overall Progression Towards Functional goals/ Treatment Progress Update:  [] Patient is progressing as expected towards functional goals listed. [x] Progression is slowed due to complexities/Impairments listed. [] Progression has been slowed due to co-morbidities. [] Plan just implemented, too soon to assess goals progression <30days   [] Goals require adjustment due to lack of progress  [] Patient is not progressing as expected and requires additional follow up with physician  [] Other    Prognosis for POC: [x] Good [] Fair  [] Poor    Patient requires continued skilled intervention: [x] Yes  [] No        PLAN: Continue frequency 1-2x/week x8 weeks for sport specific training, eccentric control   [x] Continue per plan of care [] Alter current plan (see comments)  [] Plan of care initiated [] Hold pending MD visit [] Discharge    Electronically signed by: Berta Moreira PT, DPT      Note: If patient does not return for scheduled/recommended follow up visits, this note will serve as a discharge from care along with the most recent update on progress.

## 2022-09-06 ENCOUNTER — HOSPITAL ENCOUNTER (OUTPATIENT)
Dept: PHYSICAL THERAPY | Age: 22
Setting detail: THERAPIES SERIES
Discharge: HOME | End: 2022-09-06
Payer: COMMERCIAL

## 2022-09-06 PROCEDURE — 97110 THERAPEUTIC EXERCISES: CPT

## 2022-09-06 PROCEDURE — 97530 THERAPEUTIC ACTIVITIES: CPT

## 2022-09-06 NOTE — FLOWSHEET NOTE
30s 5    ST walking lunge 2 15' Big ST   SL BOSU bridge 2 10 bee   BOSU squat 1 15          RDL 2 15 25# dB   Y balance lunging 2 10    Runners step up 2 10 12''   Lateral TB walk  3 15 feet Red AK         Manual Intervention (10411)-12      Knee mobs/PROM  4 min End range ext/flexion w/OP   Tib/Fem Mobs  4 min Posteromedial, posterolateral; prone tibial for end range flexion   Patella Mobs  4 min Restricted superiorly/inferiorly   IASTM/STM  Incisions, patella framing   Hip IR stretching    L         NMR Re-education (13990)    CUES NEEDED   Biofeedback w/CC TKE - 2 pl 10/10; 24 mA stim; 50 mA target   Sport cord march - yellow    Fwd/bwd; each direction   SLS balance - SC around back of knee cueing TKE - yellow      SLS ball toss on airex - 4# med ball  3 directions   Each direction               Therapeutic Activity (49582)- 15      Ladders      SC job/decel 2 6 bee   Squat landing 1 10    Squat decel from toes 1 10    Low squat hold 5'' 10    SL STS        Y balance testing       Access Code: [email or web address]  URL: [email or web address]  Date: 03/28/2022  Prepared by: Alex Lyon    Exercises  Prone Knee Extension Hang - 2-3 x daily - 7 x weekly - 1 sets - 5-10 min hold  Seated Knee Extension Stretch with Chair - 2-3 x daily - 7 x weekly - 1 sets - 5-10 min hold  Seated Table Hamstring Stretch - 2-3 x daily - 7 x weekly - 1 sets - 5 reps - 20s-30s hold  Long Sitting Quad Set - 2-3 x daily - 7 x weekly - 3 sets - 10 reps  Prone Terminal Knee Extension - 2-3 x daily - 7 x weekly - 1-2 sets - 10 reps - 10s hold  Supine Knee Flexion Wall Slide - 2-3 x daily - 7 x weekly - 1 sets - 10 reps - 10s hold    Bloodflow restriction was utilized throughout exercise session with use of Occlusion Cuff for a period of 8 minutes at 150 mmHg. The Cuff was deflated every 8 minutes for reperfusion during treatment. The patient was monitored for parathesia as well as poor tolerance throughout the treatment session. Therapeutic Exercise and NMR EXR  [x] (12501) Provided verbal/tactile cueing for activities related to strengthening, flexibility, endurance, ROM for improvements in LE, proximal hip, and core control with self care, mobility, lifting, ambulation. [x] (70910) Provided verbal/tactile cueing for activities related to improving balance, coordination, kinesthetic sense, posture, motor skill, proprioception  to assist with LE, proximal hip, and core control in self care, mobility, lifting, ambulation and eccentric single leg control.      NMR and Therapeutic Activities:    [x] (44814 or 35187) Provided verbal/tactile cueing for activities related to improving balance, coordination, kinesthetic sense, posture, motor skill, proprioception and motor activation to allow for proper function of core, proximal hip and LE with self care and ADLs and functional mobility.   [] (79010) Gait Re-education- Provided training and instruction to the patient for proper LE, core and proximal hip recruitment and positioning and eccentric body weight control with ambulation re-education including up and down stairs     Home Exercise Program:    [x] (30020) Reviewed/Progressed HEP activities related to strengthening, flexibility, endurance, ROM of core, proximal hip and LE for functional self-care, mobility, lifting and ambulation/stair navigation   [] (48017)Reviewed/Progressed HEP activities related to improving balance, coordination, kinesthetic sense, posture, motor skill, proprioception of core, proximal hip and LE for self care, mobility, lifting, and ambulation/stair navigation      Manual Treatments:  PROM / STM / Oscillations-Mobs:  G-I, II, III, IV (PA's, Inf., Post.)  [x] (22594) Provided manual therapy to mobilize LE, proximal hip and/or LS spine soft tissue/joints for the purpose of modulating pain, promoting relaxation,  increasing ROM, reducing/eliminating soft tissue swelling/inflammation/restriction, improving soft Patient will return to daily functional activities without increased symptoms or restriction. [] Progressing: [x] Met: [] Not Met: [] Adjusted  5. Pt will be able to demonstrate equal strength and endurance from R to L leg with good eccentric control. [x] Progressing: [] Met: [] Not Met: [] Adjusted      ASSESSMENT: Pt still lacking some end range mobility through patella and posteromedial corner. Still has fairly normal strength and endurance deficits through surgical leg. Would benefit from biodex testing for further strength analysis in comparison the position and body weight to assist in training for sport specific return to play. Needs cues throughout for eccentric control and use of posterior chain, focused on this a lot today as pt requires verbal and tactile cues for position correction and has extreme quad bias. Return to Play: (if applicable)   [x]  Stage 1: Intro to Strength   []  Stage 2: Return to Run and Strength   []  Stage 3: Return to Jump and Strength   []  Stage 4: Dynamic Strength and Agility   []  Stage 5: Sport Specific Training     []  Ready to Return to Play, Meets All Above Stages   []  Not Ready for Return to Sports   Comments:            Treatment/Activity Tolerance:  [x] Patient tolerated treatment well [] Patient limited by fatique  [] Patient limited by pain  [] Patient limited by other medical complications  [] Other:     Overall Progression Towards Functional goals/ Treatment Progress Update:  [] Patient is progressing as expected towards functional goals listed. [x] Progression is slowed due to complexities/Impairments listed. [] Progression has been slowed due to co-morbidities.   [] Plan just implemented, too soon to assess goals progression <30days   [] Goals require adjustment due to lack of progress  [] Patient is not progressing as expected and requires additional follow up with physician  [] Other    Prognosis for POC: [x] Good [] Fair  [] Poor    Patient requires continued skilled intervention: [x] Yes  [] No        PLAN: Continue frequency 1-2x/week x8 weeks for sport specific training, eccentric control   [x] Continue per plan of care [] Alter current plan (see comments)  [] Plan of care initiated [] Hold pending MD visit [] Discharge    Electronically signed by: Letha Cross PT, DPT      Note: If patient does not return for scheduled/recommended follow up visits, this note will serve as a discharge from care along with the most recent update on progress.

## 2022-09-13 ENCOUNTER — HOSPITAL ENCOUNTER (OUTPATIENT)
Dept: PHYSICAL THERAPY | Age: 22
Setting detail: THERAPIES SERIES
Discharge: HOME | End: 2022-09-13
Payer: COMMERCIAL

## 2022-09-13 PROCEDURE — 97530 THERAPEUTIC ACTIVITIES: CPT

## 2022-09-13 PROCEDURE — 97110 THERAPEUTIC EXERCISES: CPT

## 2022-09-13 NOTE — FLOWSHEET NOTE
[facility] [address] [facility]  Phone: [phone] Fax: [phone]      Physical Therapy Treatment Note/ Progress Report:     Date:  2022    Patient Name:  Charan Sandhu    :  2000  MRN: [id number]  Restrictions/Precautions:    Medical/Treatment Diagnosis Information:  Diagnosis: Z48.89 encounter for other specified surgical aftercare  Treatment Diagnosis: M25.562, M25.662, M62.559, R53.1, G61.0  Insurance/Certification information:  PT Insurance Information: BCBS; DED NOT MET; DN NOT COVERED; 80/20%; 60 VISITS/YEAR, 7 USED  Physician Information:  Referring Practitioner: Lei Morejon of care signed (Y/N):     Date of Patient follow up with Physician:      Progress Report: []  Yes  [x]  No     Date Range for reporting period:  Beginnin2022  Ending:      Progress report due (10 Rx/or 30 days whichever is less):     Recertification due (POC duration/ or 90 days whichever is less):     Visit # Insurance Allowable Auth Needed   21 37 ( 7 used) []Yes   []No     Latex Allergy:  [x]NO      []YES  Preferred Language for Healthcare:   [x]English       []other:  Functional Scale: LEFS: 50/80  Date assessed: 2022    Pain level:  2/10     SUBJECTIVE: Patient states leg is feeling good today. Knee is tight but has been working out and doing drills at [employer] and running more. Quad feels tight from just getting out of class. OBJECTIVE:   Observation:      Test measurements:      ROM LEFT RIGHT   Knee ext -2 0   Knee Flex 122 135   Strength  LEFT RIGHT   HIP Abductors 36.3 37.3   Knee EXT (quad) 53.1 62.9   Knee Flex (HS) 40.6 54.0     SL STS  R: 19  L: 15    Y balance   Curtsy:3 3 inches short   Lateral: 2 inches short   Fwd: 4 inches short    RESTRICTIONS/PRECAUTIONS: s/p L ACLr w/ patellar tendon graft,  DOS: 2021 L knee scope with scar tissue debridement on 3/14/22.      Exercises/Interventions: Therapeutic Ex (04912)-15 Sets/sec Reps Notes/CUES   BIKE 5 min     Incline calf stretch 30s 5    ST walking lunge 2 15' Big ST   SL BOSU bridge 2 10 bee   Side step 2 15' Red AK   Prone quad stretch 30'' 5    RDL 2 15 25# dB   Y balance lunging 2 10    Runners step up 2 10 12''   Lateral TB walk  3 15 feet Red AK         Manual Intervention (87656)-12      Knee mobs/PROM  4 min End range ext/flexion w/OP   Tib/Fem Mobs  4 min Posteromedial, posterolateral; prone tibial for end range flexion   Patella Mobs  4 min Restricted superiorly/inferiorly   IASTM/STM  Incisions, patella framing   Hip IR stretching    L         NMR Re-education (18444)    CUES NEEDED   Biofeedback w/CC TKE - 2 pl 10/10; 24 mA stim; 50 mA target   Sport cord march - yellow    Fwd/bwd; each direction   SLS balance - SC around back of knee cueing TKE - yellow      SLS ball toss on airex - 4# med ball  3 directions   Each direction               Therapeutic Activity (44683)- 25      Ladders 10'     SC job/decel 2 6 bee   Squat landing 1 10    Squat jumps 1 15    SL L box drive 1 15    Skater jumps 1   15    Y lunge directions 1 8 3 way     Access Code: [email or web address]  URL: [email or web address]  Date: 03/28/2022  Prepared by: Mp Le    Exercises  Prone Knee Extension Hang - 2-3 x daily - 7 x weekly - 1 sets - 5-10 min hold  Seated Knee Extension Stretch with Chair - 2-3 x daily - 7 x weekly - 1 sets - 5-10 min hold  Seated Table Hamstring Stretch - 2-3 x daily - 7 x weekly - 1 sets - 5 reps - 20s-30s hold  Long Sitting Quad Set - 2-3 x daily - 7 x weekly - 3 sets - 10 reps  Prone Terminal Knee Extension - 2-3 x daily - 7 x weekly - 1-2 sets - 10 reps - 10s hold  Supine Knee Flexion Wall Slide - 2-3 x daily - 7 x weekly - 1 sets - 10 reps - 10s hold    Bloodflow restriction was utilized throughout exercise session with use of Occlusion Cuff for a period of 8 minutes at 150 mmHg.   The Cuff was deflated every 8 minutes for reperfusion during treatment. The patient was monitored for parathesia as well as poor tolerance throughout the treatment session. Therapeutic Exercise and NMR EXR  [x] (00004) Provided verbal/tactile cueing for activities related to strengthening, flexibility, endurance, ROM for improvements in LE, proximal hip, and core control with self care, mobility, lifting, ambulation. [x] (08136) Provided verbal/tactile cueing for activities related to improving balance, coordination, kinesthetic sense, posture, motor skill, proprioception  to assist with LE, proximal hip, and core control in self care, mobility, lifting, ambulation and eccentric single leg control.      NMR and Therapeutic Activities:    [x] (55024 or 01489) Provided verbal/tactile cueing for activities related to improving balance, coordination, kinesthetic sense, posture, motor skill, proprioception and motor activation to allow for proper function of core, proximal hip and LE with self care and ADLs and functional mobility.   [] (50241) Gait Re-education- Provided training and instruction to the patient for proper LE, core and proximal hip recruitment and positioning and eccentric body weight control with ambulation re-education including up and down stairs     Home Exercise Program:    [x] (39638) Reviewed/Progressed HEP activities related to strengthening, flexibility, endurance, ROM of core, proximal hip and LE for functional self-care, mobility, lifting and ambulation/stair navigation   [] (74573)Reviewed/Progressed HEP activities related to improving balance, coordination, kinesthetic sense, posture, motor skill, proprioception of core, proximal hip and LE for self care, mobility, lifting, and ambulation/stair navigation      Manual Treatments:  PROM / STM / Oscillations-Mobs:  G-I, II, III, IV (PA's, Inf., Post.)  [x] (21950) Provided manual therapy to mobilize LE, proximal hip and/or LS spine soft tissue/joints for the purpose of modulating pain, functional mobility as indicated by patients Functional Deficits. [x] Progressing: [] Met: [] Not Met: [] Adjusted  4. Patient will return to daily functional activities without increased symptoms or restriction. [] Progressing: [x] Met: [] Not Met: [] Adjusted  5. Pt will be able to demonstrate equal strength and endurance from R to L leg with good eccentric control. [x] Progressing: [] Met: [] Not Met: [] Adjusted      ASSESSMENT: Pt w/ good tolerance to treatment focusing on proximal firing w/ increased reaction time, cues needed for eccentric knee flexion and proximal hip but able to achieve good lateral push and landing positions after a few reps. Fatigues quickly through hip. Would benefit from biodex testing for further strength analysis in comparison the position and body weight to assist in training for sport specific return to play. Needs cues throughout for eccentric control and use of posterior chain, focused on this a lot today as pt requires verbal and tactile cues for position correction and has extreme quad bias. Return to Play: (if applicable)   [x]  Stage 1: Intro to Strength   []  Stage 2: Return to Run and Strength   []  Stage 3: Return to Jump and Strength   []  Stage 4: Dynamic Strength and Agility   []  Stage 5: Sport Specific Training     []  Ready to Return to Play, Meets All Above Stages   []  Not Ready for Return to Sports   Comments:            Treatment/Activity Tolerance:  [x] Patient tolerated treatment well [] Patient limited by fatique  [] Patient limited by pain  [] Patient limited by other medical complications  [] Other:     Overall Progression Towards Functional goals/ Treatment Progress Update:  [] Patient is progressing as expected towards functional goals listed. [x] Progression is slowed due to complexities/Impairments listed. [] Progression has been slowed due to co-morbidities.   [] Plan just implemented, too soon to assess goals progression <30days   [] Goals require adjustment due to lack of progress  [] Patient is not progressing as expected and requires additional follow up with physician  [] Other    Prognosis for POC: [x] Good [] Fair  [] Poor    Patient requires continued skilled intervention: [x] Yes  [] No        PLAN: Continue frequency 1-2x/week x8 weeks for sport specific training, eccentric control   [x] Continue per plan of care [] Alter current plan (see comments)  [] Plan of care initiated [] Hold pending MD visit [] Discharge    Electronically signed by: Wilfredo Carrion, PT, DPT      Note: If patient does not return for scheduled/recommended follow up visits, this note will serve as a discharge from care along with the most recent update on progress.

## 2022-09-15 ENCOUNTER — HOSPITAL ENCOUNTER (OUTPATIENT)
Dept: PHYSICAL THERAPY | Age: 22
Setting detail: THERAPIES SERIES
Discharge: HOME | End: 2022-09-15
Payer: COMMERCIAL

## 2022-09-15 PROCEDURE — 97110 THERAPEUTIC EXERCISES: CPT

## 2022-09-15 PROCEDURE — 97140 MANUAL THERAPY 1/> REGIONS: CPT

## 2022-09-15 NOTE — FLOWSHEET NOTE
[place] [address] [facility]  Phone: [phone] Fax: [phone]      Physical Therapy Treatment Note/ Progress Report:     Date:  9/15/2022    Patient Name:  Charan Sandhu    :  2000  MRN: [id number]  Restrictions/Precautions:    Medical/Treatment Diagnosis Information:  Diagnosis: Z48.89 encounter for other specified surgical aftercare  Treatment Diagnosis: M25.562, M25.662, M62.559, R53.1, P70.3  Insurance/Certification information:  PT Insurance Information: BCBS; DED NOT MET; DN NOT COVERED; 80/20%; 60 VISITS/YEAR, 7 USED  Physician Information:  Referring Practitioner: Lei Morejon of care signed (Y/N):     Date of Patient follow up with Physician:      Progress Report: []  Yes  [x]  No     Date Range for reporting period:  Beginnin2022  Ending:      Progress report due (10 Rx/or 30 days whichever is less):     Recertification due (POC duration/ or 90 days whichever is less):     Visit # Insurance Allowable Auth Needed   96 10 ( 7 used) []Yes   []No     Latex Allergy:  [x]NO      []YES  Preferred Language for Healthcare:   [x]English       []other:  Functional Scale: LEFS: 50/80  Date assessed: 2022    Pain level:  2/10     SUBJECTIVE: Patient states knee has been feeling tight today. Has workout after todays session. OBJECTIVE:   Observation:      Test measurements:      ROM LEFT RIGHT   Knee ext -2 0   Knee Flex 122 135   Strength  LEFT RIGHT   HIP Abductors 36.3 37.3   Knee EXT (quad) 53.1 62.9   Knee Flex (HS) 40.6 54.0     SL STS  R: 19  L: 15    Y balance   Curtsy:3 3 inches short   Lateral: 2 inches short   Fwd: 4 inches short    RESTRICTIONS/PRECAUTIONS: s/p L ACLr w/ patellar tendon graft,  DOS: 2021 L knee scope with scar tissue debridement on 3/14/22.      Exercises/Interventions:     Therapeutic Ex (51569)-21 Sets/sec Reps Notes/CUES   BIKE 5 min     Incline calf stretch 30s 5    DF mob to wall 2 10    Retro slider lunge 2 10 Knee to foam roll   ST walking lunge 2 15' Big ST   SL BOSU bridge 2 10 bee   Side step 2 15' Red AK   Prone quad stretch 30'' 5    RDL 2 15 25# dB   Y balance lunging 2 10    Runners step up 2 10 12''   Lateral TB walk  3 15 feet Red AK         Manual Intervention (97122)-20      Knee mobs/PROM  6 min End range ext/flexion w/ OP   Tib/Fem Mobs  8 min Posteromedial, posterolateral; prone tibial for end range flexion   Patella Mobs  6 min Restricted superiorly/inferiorly   IASTM/STM  Incisions, patella framing   Hip IR stretching    L         NMR Re-education (60450)    CUES NEEDED   Biofeedback w/CC TKE - 2 pl 10/10; 24 mA stim; 50 mA target   Sport cord march - yellow    Fwd/bwd; each direction   SLS balance - SC around back of knee cueing TKE - yellow      SLS ball toss on airex - 4# med ball  3 directions   Each direction               Therapeutic Activity (47457)- 25      Ladders 10'     SC job/decel 2 6 bee   Squat landing 1 10    Squat jumps 1 15    SL L box drive 1 15    Skater jumps 1   15    Y lunge directions 1 8 3 way     Access Code: [email or web address]  URL: [email or web address]  Date: 03/28/2022  Prepared by: Danyel De La Cruz    Exercises  Prone Knee Extension Hang - 2-3 x daily - 7 x weekly - 1 sets - 5-10 min hold  Seated Knee Extension Stretch with Chair - 2-3 x daily - 7 x weekly - 1 sets - 5-10 min hold  Seated Table Hamstring Stretch - 2-3 x daily - 7 x weekly - 1 sets - 5 reps - 20s-30s hold  Long Sitting Quad Set - 2-3 x daily - 7 x weekly - 3 sets - 10 reps  Prone Terminal Knee Extension - 2-3 x daily - 7 x weekly - 1-2 sets - 10 reps - 10s hold  Supine Knee Flexion Wall Slide - 2-3 x daily - 7 x weekly - 1 sets - 10 reps - 10s hold    Bloodflow restriction was utilized throughout exercise session with use of Occlusion Cuff for a period of 8 minutes at 150 mmHg. The Cuff was deflated every 8 minutes for reperfusion during treatment.   The patient was monitored for parathesia as well as poor tolerance throughout the treatment session. Therapeutic Exercise and NMR EXR  [x] (73486) Provided verbal/tactile cueing for activities related to strengthening, flexibility, endurance, ROM for improvements in LE, proximal hip, and core control with self care, mobility, lifting, ambulation. [x] (15267) Provided verbal/tactile cueing for activities related to improving balance, coordination, kinesthetic sense, posture, motor skill, proprioception  to assist with LE, proximal hip, and core control in self care, mobility, lifting, ambulation and eccentric single leg control.      NMR and Therapeutic Activities:    [x] (82663 or 15015) Provided verbal/tactile cueing for activities related to improving balance, coordination, kinesthetic sense, posture, motor skill, proprioception and motor activation to allow for proper function of core, proximal hip and LE with self care and ADLs and functional mobility.   [] (18231) Gait Re-education- Provided training and instruction to the patient for proper LE, core and proximal hip recruitment and positioning and eccentric body weight control with ambulation re-education including up and down stairs     Home Exercise Program:    [x] (92210) Reviewed/Progressed HEP activities related to strengthening, flexibility, endurance, ROM of core, proximal hip and LE for functional self-care, mobility, lifting and ambulation/stair navigation   [] (04052)Reviewed/Progressed HEP activities related to improving balance, coordination, kinesthetic sense, posture, motor skill, proprioception of core, proximal hip and LE for self care, mobility, lifting, and ambulation/stair navigation      Manual Treatments:  PROM / STM / Oscillations-Mobs:  G-I, II, III, IV (PA's, Inf., Post.)  [x] (04056) Provided manual therapy to mobilize LE, proximal hip and/or LS spine soft tissue/joints for the purpose of modulating pain, promoting relaxation,  increasing ROM, reducing/eliminating soft tissue swelling/inflammation/restriction, improving soft tissue extensibility and allowing for proper ROM for normal function with self care, mobility, lifting and ambulation. Modalities:     [] GAME READY (VASO)- for significant edema, swelling, pain control. - 15 minutes    Charges:  Timed Code Treatment Minutes: 40   Total Treatment Minutes: 40      [] EVAL (LOW) 58071 (typically 20 minutes face-to-face)  [] EVAL (MOD) 98695 (typically 30 minutes face-to-face)  [] EVAL (HIGH) 90638 (typically 45 minutes face-to-face)  [] RE-EVAL     [x] NO(34778) x 2  [] DRY NEEDLE 1 OR 2 MUSCLES  [] NMR (76157) x    [] DRY NEEDLE 3+ MUSCLES  [x] Manual (73175) x 1     [] TA (91510) x   2  [] Mech Traction (49712)  [] ES(attended) (57838)     [] ES (un) (97040):   [] VASO (01634)  [] Other:      GOALS:  Patient stated goal: \"get my leg straight and be able to walk without crutch\"  [] Progressing: [] Met: [x] Not Met: [] Adjusted     Therapist goals for Patient:   Short Term Goals: To be achieved in: 2 weeks  1. Independent in HEP and progression per patient tolerance, in order to prevent re-injury. [] Progressing: [x] Met: [] Not Met: [] Adjusted  2. Patient will have a decrease in pain to facilitate improvement in movement, function, and ADLs as indicated by Functional Deficits. [x] Progressing: [] Met: [] Not Met: [] Adjusted     Long Term Goals: To be achieved in: 8 weeks  1. Disability index score of 10% or less for the LEFS to assist with reaching prior level of function. [x] Progressing: [] Met: [] Not Met: [] Adjusted  2. Patient will demonstrate increased AROM to full L knee to allow for proper joint functioning as indicated by patients Functional Deficits. [] Progressing: [] Met: [x] Not Met: [] Adjusted  3.  Patient will demonstrate an increase in strength to good proximal hip strength and control, within 5lb HHD in LE to allow for proper functional mobility as indicated by patients Functional Deficits. [x] Progressing: [] Met: [] Not Met: [] Adjusted  4. Patient will return to daily functional activities without increased symptoms or restriction. [] Progressing: [x] Met: [] Not Met: [] Adjusted  5. Pt will be able to demonstrate equal strength and endurance from R to L leg with good eccentric control. [x] Progressing: [] Met: [] Not Met: [] Adjusted      ASSESSMENT: Focused on knee mobility and active mobility and flexibility work trying to improve terminal extension. Reviewed self mobs for ankle and knee to do prior to work outs and importance of active vs passive independent work. Reported improvement in knee symptoms end of session today. Return to Play: (if applicable)   [x]  Stage 1: Intro to Strength   []  Stage 2: Return to Run and Strength   []  Stage 3: Return to Jump and Strength   []  Stage 4: Dynamic Strength and Agility   []  Stage 5: Sport Specific Training     []  Ready to Return to Play, Meets All Above Stages   []  Not Ready for Return to Sports   Comments:            Treatment/Activity Tolerance:  [x] Patient tolerated treatment well [] Patient limited by fatique  [] Patient limited by pain  [] Patient limited by other medical complications  [] Other:     Overall Progression Towards Functional goals/ Treatment Progress Update:  [] Patient is progressing as expected towards functional goals listed. [x] Progression is slowed due to complexities/Impairments listed. [] Progression has been slowed due to co-morbidities.   [] Plan just implemented, too soon to assess goals progression <30days   [] Goals require adjustment due to lack of progress  [] Patient is not progressing as expected and requires additional follow up with physician  [] Other    Prognosis for POC: [x] Good [] Fair  [] Poor    Patient requires continued skilled intervention: [x] Yes  [] No        PLAN: Continue frequency 1-2x/week x8 weeks for sport specific training, eccentric control [x] Continue per plan of care [] Alter current plan (see comments)  [] Plan of care initiated [] Hold pending MD visit [] Discharge    Electronically signed by: Stevenson Shannon PT, DPT      Note: If patient does not return for scheduled/recommended follow up visits, this note will serve as a discharge from care along with the most recent update on progress.

## 2022-09-20 ENCOUNTER — HOSPITAL ENCOUNTER (OUTPATIENT)
Dept: PHYSICAL THERAPY | Age: 22
Setting detail: THERAPIES SERIES
Discharge: HOME | End: 2022-09-20
Payer: COMMERCIAL

## 2022-09-20 PROCEDURE — 97110 THERAPEUTIC EXERCISES: CPT

## 2022-09-20 PROCEDURE — 97140 MANUAL THERAPY 1/> REGIONS: CPT

## 2022-09-20 PROCEDURE — 97530 THERAPEUTIC ACTIVITIES: CPT

## 2022-09-20 NOTE — FLOWSHEET NOTE
[place] [address] [facility]  Phone: [phone] Fax: [phone]      Physical Therapy Treatment Note/ Progress Report:     Date:  2022    Patient Name:  Jayleen Tilley    :  2000  MRN: [id number]  Restrictions/Precautions:    Medical/Treatment Diagnosis Information:  Diagnosis: Z48.89 encounter for other specified surgical aftercare  Treatment Diagnosis: M25.562, M25.662, M62.559, R53.1, D58.5  Insurance/Certification information:  PT Insurance Information: BCBS; DED NOT MET; DN NOT COVERED; 80/20%; 60 VISITS/YEAR, 7 USED  Physician Information:  Referring Practitioner: Reza Ojeda of care signed (Y/N):     Date of Patient follow up with Physician:      Progress Report: []  Yes  [x]  No     Date Range for reporting period:  Beginnin2022  Ending:      Progress report due (10 Rx/or 30 days whichever is less):     Recertification due (POC duration/ or 90 days whichever is less):     Visit # Insurance Allowable Auth Needed   75 77 ( 7 used) []Yes   []No     Latex Allergy:  [x]NO      []YES  Preferred Language for Healthcare:   [x]English       []other:  Functional Scale: LEFS: 50/80  Date assessed: 2022    Pain level:  2/10     SUBJECTIVE: Patient states ongoing soreness and tightness with knee. Pt states that he also stretches his hips, but feels tight through the hips most days too. OBJECTIVE:   Observation:      Test measurements:      ROM LEFT RIGHT   Knee ext -2 0   Knee Flex 122 135   Strength  LEFT RIGHT   HIP Abductors 36.3 37.3   Knee EXT (quad) 53.1 62.9   Knee Flex (HS) 40.6 54.0     SL STS  R: 19  L: 15    Y balance   Curtsy:3 3 inches short   Lateral: 2 inches short   Fwd: 4 inches short    RESTRICTIONS/PRECAUTIONS: s/p L ACLr w/ patellar tendon graft,  DOS: 2021 L knee scope with scar tissue debridement on 3/14/22.      Exercises/Interventions:     Therapeutic Ex (71812)-37 Sets/sec Reps Notes/CUES   BIKE 5 min     Incline calf stretch 30s 5    DF mob to wall 2 10    Retro slider lunge 2 10 Knee to foam roll   ST walking lunge 2 15' Big ST   SL BOSU bridge 2 10 bee   Side step 2 15' Red AK   Prone quad stretch 30'' 5    RDL 2 15 25# dB   Y balance lunging 2 10    Runners step up 2 10 12''   Lateral TB walk  3 15 feet Red AK         Manual Intervention (88090)-16      Knee mobs/PROM  6 min End range ext/flexion w/ OP   Tib/Fem Mobs  6 min Posteromedial, posterolateral; prone tibial for end range flexion   Patella Mobs  4 min Restricted superiorly/inferiorly   IASTM/STM  Incisions, patella framing   Hip IR stretching    L         NMR Re-education (81666)    CUES NEEDED   Biofeedback w/CC TKE - 2 pl 10/10; 24 mA stim; 50 mA target   Sport cord march - yellow    Fwd/bwd; each direction   SLS balance - SC around back of knee cueing TKE - yellow      SLS ball toss on airex - 4# med ball  3 directions   Each direction               Therapeutic Activity (83307)- 15      Ladders 10'     SC job/decel 2 6 bee   Squat landing 1 10    Squat jumps 1 15    SL L box drive 1 15    Skater jumps 1   15    Y lunge directions 1 8 3 way     Access Code: [email or web address]  URL: [email or web address]  Date: 03/28/2022  Prepared by: Addison Booth    Exercises  Prone Knee Extension Hang - 2-3 x daily - 7 x weekly - 1 sets - 5-10 min hold  Seated Knee Extension Stretch with Chair - 2-3 x daily - 7 x weekly - 1 sets - 5-10 min hold  Seated Table Hamstring Stretch - 2-3 x daily - 7 x weekly - 1 sets - 5 reps - 20s-30s hold  Long Sitting Quad Set - 2-3 x daily - 7 x weekly - 3 sets - 10 reps  Prone Terminal Knee Extension - 2-3 x daily - 7 x weekly - 1-2 sets - 10 reps - 10s hold  Supine Knee Flexion Wall Slide - 2-3 x daily - 7 x weekly - 1 sets - 10 reps - 10s hold    Bloodflow restriction was utilized throughout exercise session with use of Occlusion Cuff for a period of 8 minutes at 150 mmHg.   The Cuff was deflated every 8 minutes for reperfusion during treatment. The patient was monitored for parathesia as well as poor tolerance throughout the treatment session. Therapeutic Exercise and NMR EXR  [x] (95708) Provided verbal/tactile cueing for activities related to strengthening, flexibility, endurance, ROM for improvements in LE, proximal hip, and core control with self care, mobility, lifting, ambulation. [x] (62472) Provided verbal/tactile cueing for activities related to improving balance, coordination, kinesthetic sense, posture, motor skill, proprioception  to assist with LE, proximal hip, and core control in self care, mobility, lifting, ambulation and eccentric single leg control.      NMR and Therapeutic Activities:    [x] (59877 or 54012) Provided verbal/tactile cueing for activities related to improving balance, coordination, kinesthetic sense, posture, motor skill, proprioception and motor activation to allow for proper function of core, proximal hip and LE with self care and ADLs and functional mobility.   [] (30902) Gait Re-education- Provided training and instruction to the patient for proper LE, core and proximal hip recruitment and positioning and eccentric body weight control with ambulation re-education including up and down stairs     Home Exercise Program:    [x] (70167) Reviewed/Progressed HEP activities related to strengthening, flexibility, endurance, ROM of core, proximal hip and LE for functional self-care, mobility, lifting and ambulation/stair navigation   [] (63035)Reviewed/Progressed HEP activities related to improving balance, coordination, kinesthetic sense, posture, motor skill, proprioception of core, proximal hip and LE for self care, mobility, lifting, and ambulation/stair navigation      Manual Treatments:  PROM / STM / Oscillations-Mobs:  G-I, II, III, IV (PA's, Inf., Post.)  [x] (24518) Provided manual therapy to mobilize LE, proximal hip and/or LS spine soft tissue/joints for the purpose of modulating pain, promoting relaxation,  increasing ROM, reducing/eliminating soft tissue swelling/inflammation/restriction, improving soft tissue extensibility and allowing for proper ROM for normal function with self care, mobility, lifting and ambulation. Modalities:     [] GAME READY (VASO)- for significant edema, swelling, pain control. - 15 minutes    Charges:  Timed Code Treatment Minutes: 51   Total Treatment Minutes: 51      [] EVAL (LOW) 56538 (typically 20 minutes face-to-face)  [] EVAL (MOD) 03891 (typically 30 minutes face-to-face)  [] EVAL (HIGH) 32202 (typically 45 minutes face-to-face)  [] RE-EVAL     [x] MX(97706) x 1  [] DRY NEEDLE 1 OR 2 MUSCLES  [] NMR (82545) x    [] DRY NEEDLE 3+ MUSCLES  [x] Manual (65124) x 1     [x] TA (78191) x   1  [] Mech Traction (62388)  [] ES(attended) (91721)     [] ES (un) (72260):   [] VASO (19941)  [] Other:      GOALS:  Patient stated goal: \"get my leg straight and be able to walk without crutch\"  [] Progressing: [] Met: [x] Not Met: [] Adjusted     Therapist goals for Patient:   Short Term Goals: To be achieved in: 2 weeks  1. Independent in HEP and progression per patient tolerance, in order to prevent re-injury. [] Progressing: [x] Met: [] Not Met: [] Adjusted  2. Patient will have a decrease in pain to facilitate improvement in movement, function, and ADLs as indicated by Functional Deficits. [x] Progressing: [] Met: [] Not Met: [] Adjusted     Long Term Goals: To be achieved in: 8 weeks  1. Disability index score of 10% or less for the LEFS to assist with reaching prior level of function. [x] Progressing: [] Met: [] Not Met: [] Adjusted  2. Patient will demonstrate increased AROM to full L knee to allow for proper joint functioning as indicated by patients Functional Deficits. [] Progressing: [] Met: [x] Not Met: [] Adjusted  3.  Patient will demonstrate an increase in strength to good proximal hip strength and control, within 5lb HHD in LE to allow for proper functional mobility as indicated by patients Functional Deficits. [x] Progressing: [] Met: [] Not Met: [] Adjusted  4. Patient will return to daily functional activities without increased symptoms or restriction. [] Progressing: [x] Met: [] Not Met: [] Adjusted  5. Pt will be able to demonstrate equal strength and endurance from R to L leg with good eccentric control. [x] Progressing: [] Met: [] Not Met: [] Adjusted      ASSESSMENT: Focused on knee mobility and active mobility and flexibility work trying to improve terminal extension. Reviewed self mobs for ankle and knee to do prior to work outs and importance of active vs passive independent work. Reported improvement in knee symptoms end of session today. Return to Play: (if applicable)   [x]  Stage 1: Intro to Strength   []  Stage 2: Return to Run and Strength   []  Stage 3: Return to Jump and Strength   []  Stage 4: Dynamic Strength and Agility   []  Stage 5: Sport Specific Training     []  Ready to Return to Play, Meets All Above Stages   []  Not Ready for Return to Sports   Comments:            Treatment/Activity Tolerance:  [x] Patient tolerated treatment well [] Patient limited by fatique  [] Patient limited by pain  [] Patient limited by other medical complications  [] Other:     Overall Progression Towards Functional goals/ Treatment Progress Update:  [] Patient is progressing as expected towards functional goals listed. [x] Progression is slowed due to complexities/Impairments listed. [] Progression has been slowed due to co-morbidities.   [] Plan just implemented, too soon to assess goals progression <30days   [] Goals require adjustment due to lack of progress  [] Patient is not progressing as expected and requires additional follow up with physician  [] Other    Prognosis for POC: [x] Good [] Fair  [] Poor    Patient requires continued skilled intervention: [x] Yes  [] No        PLAN: Continue frequency 1-2x/week x8 weeks for sport specific training, eccentric control   [x] Continue per plan of care [] Alter current plan (see comments)  [] Plan of care initiated [] Hold pending MD visit [] Discharge    Electronically signed by: Shania Stout PTA      Note: If patient does not return for scheduled/recommended follow up visits, this note will serve as a discharge from care along with the most recent update on progress.

## 2022-09-22 ENCOUNTER — HOSPITAL ENCOUNTER (OUTPATIENT)
Dept: PHYSICAL THERAPY | Age: 22
Setting detail: THERAPIES SERIES
Discharge: HOME | End: 2022-09-22
Payer: COMMERCIAL

## 2022-09-27 ENCOUNTER — HOSPITAL ENCOUNTER (OUTPATIENT)
Dept: PHYSICAL THERAPY | Age: 22
Setting detail: THERAPIES SERIES
Discharge: HOME | End: 2022-09-27
Payer: COMMERCIAL

## 2022-09-27 PROCEDURE — 97110 THERAPEUTIC EXERCISES: CPT

## 2022-09-27 PROCEDURE — 97140 MANUAL THERAPY 1/> REGIONS: CPT

## 2022-09-27 NOTE — FLOWSHEET NOTE
[place] [address] [place] [address]  Phone: [phone] Fax: [phone]      Physical Therapy Treatment Note/ Progress Report:     Date:  2022    Patient Name:  Jennifer Mckeon    :  2000  MRN: [id number]  Restrictions/Precautions:    Medical/Treatment Diagnosis Information:  Diagnosis: Z48.89 encounter for other specified surgical aftercare  Treatment Diagnosis: M25.562, M25.662, M62.559, R53.1, Q74.2  Insurance/Certification information:  PT Insurance Information: BCBS; DED NOT MET; DN NOT COVERED; 80/20%; 60 VISITS/YEAR, 7 USED  Physician Information:  Referring Practitioner: Bonifacio Beasley of care signed (Y/N):     Date of Patient follow up with Physician:      Progress Report: []  Yes  [x]  No     Date Range for reporting period:  Beginnin2022  Ending:      Progress report due (10 Rx/or 30 days whichever is less):     Recertification due (POC duration/ or 90 days whichever is less):     Visit # Insurance Allowable Auth Needed   81 37 ( 7 used) []Yes   []No     Latex Allergy:  [x]NO      []YES  Preferred Language for Healthcare:   [x]English       []other:  Functional Scale: LEFS: 50/80  Date assessed: 2022    Pain level:  2/10     SUBJECTIVE: Patient states that he has been sick since last visit. He also got his COVID vaccine yesterday and is feeling a little run down today. OBJECTIVE: Pt arrived 8 minutes late for today's session. Observation:      Test measurements:      ROM LEFT RIGHT   Knee ext -2 0   Knee Flex 122 135   Strength  LEFT RIGHT   HIP Abductors 36.3 37.3   Knee EXT (quad) 53.1 62.9   Knee Flex (HS) 40.6 54.0     SL STS  R: 19  L: 15    Y balance   Curtsy:3 3 inches short   Lateral: 2 inches short   Fwd: 4 inches short    RESTRICTIONS/PRECAUTIONS: s/p L ACLr w/ patellar tendon graft,  DOS: 2021 L knee scope with scar tissue debridement on 3/14/22. Exercises/Interventions:     Therapeutic Ex (30912)-30 Sets/sec Reps Notes/CUES   BIKE 5 min     Incline calf stretch 30s 5    DF mob to wall 2 10    Retro slider lunge 2 10 2 red kb's   ST walking lunge 2 15' Big ST   Lateral Slide lunge 1 10 Red kb   SL BOSU bridge 2 10 bee   Side step 2 15' white AK   Prone quad stretch 30'' 5    RDL 1 15 Medium ST   Y balance lunging 2 10    Single leg squat to chair 2 10 Chair+Airex    Runners step up 2 10 12''   Leg Press pulses- single leg 5 5 60lb, 30-60 deg   SLS transitions 5 5 Airex, holds at 0 deg and 30 deg   TRX single leg squat hold 5sec 10 Cues to use L LE and not overuse upper body   Manual Intervention (15068)-18      Knee mobs/PROM  6 min End range ext/flexion w/ OP   Tib/Fem Mobs  6 min Posteromedial, posterolateral; prone tibial for end range flexion   Patella Mobs  3 min Restricted superiorly/inferiorly   IASTM/STM  Incisions, patella framing   Hip IR stretching  3  L         NMR Re-education (60346)    CUES NEEDED   Biofeedback w/CC TKE - 2 pl 10/10; 24 mA stim; 50 mA target   Sport cord march - yellow    Fwd/bwd; each direction   SLS balance - SC around back of knee cueing TKE - yellow      SLS ball toss on airex - 4# med ball  3 directions   Each direction               Therapeutic Activity (93759)-       Ladders 10'     SC job/decel 2 6 bee   Squat landing 1 10    Squat jumps 1 15    SL L box drive 1 15    Skater jumps 1   15    Y lunge directions 1 8 3 way     Access Code: [email or web address]  URL: [email or web address]  Date: 03/28/2022  Prepared by: Karli Ped    Exercises  Prone Knee Extension Hang - 2-3 x daily - 7 x weekly - 1 sets - 5-10 min hold  Seated Knee Extension Stretch with Chair - 2-3 x daily - 7 x weekly - 1 sets - 5-10 min hold  Seated Table Hamstring Stretch - 2-3 x daily - 7 x weekly - 1 sets - 5 reps - 20s-30s hold  Long Sitting Quad Set - 2-3 x daily - 7 x weekly - 3 sets - 10 reps  Prone Terminal Knee Extension - 2-3 x daily - 7 x weekly - 1-2 sets - 10 reps - 10s hold  Supine Knee Flexion Wall Slide - 2-3 x daily - 7 x weekly - 1 sets - 10 reps - 10s hold    Bloodflow restriction was utilized throughout exercise session with use of Occlusion Cuff for a period of 8 minutes at 150 mmHg. The Cuff was deflated every 8 minutes for reperfusion during treatment. The patient was monitored for parathesia as well as poor tolerance throughout the treatment session. Therapeutic Exercise and NMR EXR  [x] (78563) Provided verbal/tactile cueing for activities related to strengthening, flexibility, endurance, ROM for improvements in LE, proximal hip, and core control with self care, mobility, lifting, ambulation. [x] (56170) Provided verbal/tactile cueing for activities related to improving balance, coordination, kinesthetic sense, posture, motor skill, proprioception  to assist with LE, proximal hip, and core control in self care, mobility, lifting, ambulation and eccentric single leg control.      NMR and Therapeutic Activities:    [x] (76058 or 90878) Provided verbal/tactile cueing for activities related to improving balance, coordination, kinesthetic sense, posture, motor skill, proprioception and motor activation to allow for proper function of core, proximal hip and LE with self care and ADLs and functional mobility.   [] (56027) Gait Re-education- Provided training and instruction to the patient for proper LE, core and proximal hip recruitment and positioning and eccentric body weight control with ambulation re-education including up and down stairs     Home Exercise Program:    [x] (95638) Reviewed/Progressed HEP activities related to strengthening, flexibility, endurance, ROM of core, proximal hip and LE for functional self-care, mobility, lifting and ambulation/stair navigation   [] (35796)Reviewed/Progressed HEP activities related to improving balance, coordination, kinesthetic sense, posture, motor skill, proprioception of core, proximal hip and LE for self care, mobility, lifting, and ambulation/stair navigation      Manual Treatments:  PROM / STM / Oscillations-Mobs:  G-I, II, III, IV (PA's, Inf., Post.)  [x] (55282) Provided manual therapy to mobilize LE, proximal hip and/or LS spine soft tissue/joints for the purpose of modulating pain, promoting relaxation,  increasing ROM, reducing/eliminating soft tissue swelling/inflammation/restriction, improving soft tissue extensibility and allowing for proper ROM for normal function with self care, mobility, lifting and ambulation. Modalities:     [] GAME READY (VASO)- for significant edema, swelling, pain control. - 15 minutes    Charges:  Timed Code Treatment Minutes: 48   Total Treatment Minutes: 48      [] EVAL (LOW) 92559 (typically 20 minutes face-to-face)  [] EVAL (MOD) 18466 (typically 30 minutes face-to-face)  [] EVAL (HIGH) 76760 (typically 45 minutes face-to-face)  [] RE-EVAL     [x] ZX(59473) x 2  [] DRY NEEDLE 1 OR 2 MUSCLES  [] NMR (12944) x    [] DRY NEEDLE 3+ MUSCLES  [x] Manual (12481) x 1     [] TA (80292) x    [] Mech Traction (06710)  [] ES(attended) (44481)     [] ES (un) (49629):   [] VASO (50655)  [] Other:      GOALS:  Patient stated goal: \"get my leg straight and be able to walk without crutch\"  [] Progressing: [] Met: [x] Not Met: [] Adjusted     Therapist goals for Patient:   Short Term Goals: To be achieved in: 2 weeks  1. Independent in HEP and progression per patient tolerance, in order to prevent re-injury. [] Progressing: [x] Met: [] Not Met: [] Adjusted  2. Patient will have a decrease in pain to facilitate improvement in movement, function, and ADLs as indicated by Functional Deficits. [x] Progressing: [] Met: [] Not Met: [] Adjusted     Long Term Goals: To be achieved in: 8 weeks  1. Disability index score of 10% or less for the LEFS to assist with reaching prior level of function. [x] Progressing: [] Met: [] Not Met: [] Adjusted  2.  Patient will demonstrate increased AROM to full L knee to allow for proper joint functioning as indicated by patients Functional Deficits. [] Progressing: [] Met: [x] Not Met: [] Adjusted  3. Patient will demonstrate an increase in strength to good proximal hip strength and control, within 5lb HHD in LE to allow for proper functional mobility as indicated by patients Functional Deficits. [x] Progressing: [] Met: [] Not Met: [] Adjusted  4. Patient will return to daily functional activities without increased symptoms or restriction. [] Progressing: [x] Met: [] Not Met: [] Adjusted  5. Pt will be able to demonstrate equal strength and endurance from R to L leg with good eccentric control. [x] Progressing: [] Met: [] Not Met: [] Adjusted      ASSESSMENT: Focused on knee mobility and active mobility and flexibility work trying to improve terminal extension. Focused exercises on functional positions for improved LE strength and NM control. Return to Play: (if applicable)   [x]  Stage 1: Intro to Strength   []  Stage 2: Return to Run and Strength   []  Stage 3: Return to Jump and Strength   []  Stage 4: Dynamic Strength and Agility   []  Stage 5: Sport Specific Training     []  Ready to Return to Play, Meets All Above Stages   []  Not Ready for Return to Sports   Comments:            Treatment/Activity Tolerance:  [x] Patient tolerated treatment well [] Patient limited by fatique  [] Patient limited by pain  [] Patient limited by other medical complications  [] Other:     Overall Progression Towards Functional goals/ Treatment Progress Update:  [] Patient is progressing as expected towards functional goals listed. [x] Progression is slowed due to complexities/Impairments listed. [] Progression has been slowed due to co-morbidities.   [] Plan just implemented, too soon to assess goals progression <30days   [] Goals require adjustment due to lack of progress  [] Patient is not progressing as expected and requires additional follow up with physician  [] Other    Prognosis for POC: [x] Good [] Fair  [] Poor    Patient requires continued skilled intervention: [x] Yes  [] No        PLAN: Continue frequency 1-2x/week x8 weeks for sport specific training, eccentric control   [x] Continue per plan of care [] Alter current plan (see comments)  [] Plan of care initiated [] Hold pending MD visit [] Discharge    Electronically signed by: Ivy Luque PTA      Note: If patient does not return for scheduled/recommended follow up visits, this note will serve as a discharge from care along with the most recent update on progress.

## 2022-09-29 ENCOUNTER — HOSPITAL ENCOUNTER (OUTPATIENT)
Dept: PHYSICAL THERAPY | Age: 22
Setting detail: THERAPIES SERIES
Discharge: HOME | End: 2022-09-29
Payer: COMMERCIAL

## 2022-09-29 PROCEDURE — 97530 THERAPEUTIC ACTIVITIES: CPT

## 2022-09-29 PROCEDURE — 97110 THERAPEUTIC EXERCISES: CPT

## 2022-09-29 PROCEDURE — 97140 MANUAL THERAPY 1/> REGIONS: CPT

## 2022-09-29 NOTE — FLOWSHEET NOTE
[place] [address] [address]  Phone: [phone] Fax: [phone]      Physical Therapy Treatment Note/ Progress Report:     Date:  2022    Patient Name:  Lucille Mcconnell    :  2000  MRN: [id number]  Restrictions/Precautions:    Medical/Treatment Diagnosis Information:  Diagnosis: Z48.89 encounter for other specified surgical aftercare  Treatment Diagnosis: M25.562, M25.662, M62.559, R53.1, Q75.9  Insurance/Certification information:  PT Insurance Information: BCBS; DED NOT MET; DN NOT COVERED; 80/20%; 60 VISITS/YEAR, 7 USED  Physician Information:  Referring Practitioner: Renita Marr of care signed (Y/N):     Date of Patient follow up with Physician:      Progress Report: []  Yes  [x]  No     Date Range for reporting period:  Beginnin2022  Ending:      Progress report due (10 Rx/or 30 days whichever is less):     Recertification due (POC duration/ or 90 days whichever is less):     Visit # Insurance Allowable Auth Needed   00 18 ( 7 used) []Yes   []No     Latex Allergy:  [x]NO      []YES  Preferred Language for Healthcare:   [x]English       []other:  Functional Scale: LEFS: 50/80  Date assessed: 2022    Pain level:  2/10     SUBJECTIVE: Patient states that he is feeling very sleepy. He has been up late into the night while he goes through [employer] activities. Pt lost his voice and is feeling generally fatigued. OBJECTIVE: Pt arrived 10 minutes late for today's session.    Observation:      Test measurements:      ROM LEFT RIGHT   Knee ext -2 0   Knee Flex 122 135   Strength  LEFT RIGHT   HIP Abductors 36.3 37.3   Knee EXT (quad) 53.1 62.9   Knee Flex (HS) 40.6 54.0     SL STS  R: 19  L: 15    Y balance   Curtsy:3 3 inches short   Lateral: 2 inches short   Fwd: 4 inches short    RESTRICTIONS/PRECAUTIONS: s/p L ACLr w/ patellar tendon graft,  DOS: 2021 L knee scope with scar tissue debridement on 3/14/22. Exercises/Interventions:     Therapeutic Ex (07689)-18 Sets/sec Reps Notes/CUES   BIKE 5 min     Incline calf stretch 30s 5    DF mob to wall 2 10    Retro slider lunge 2 10 2 red kb's   ST walking lunge 2 15' Big ST   Lateral Slide lunge 1 10 Red kb   SL BOSU bridge 2 10 bee   Side step 2 15' white AK   Prone quad stretch 30'' 5    SL kb touches to box 2 10 Red kb, level ground, 12in box   RDL 2 10 2 blue kb, kickstand   Y balance lunging 2 10    Single leg squat to chair 2 10 Chair+Airex    Runners step up 2 10 12''   Leg Press pulses- single leg 5 5 60lb, 30-60 deg   SLS transitions 5 5 Airex, holds at 0 deg and 30 deg   TRX single leg squat hold 5sec 10 Cues to use L LE and not overuse upper body   Manual Intervention (22664)-18      Knee mobs/PROM  6 min End range ext/flexion w/ OP   Tib/Fem Mobs  6 min Posteromedial, posterolateral; prone tibial for end range flexion   Patella Mobs  3 min Restricted superiorly/inferiorly   IASTM/STM  Incisions, patella framing   Hip IR stretching  3  L         NMR Re-education (33331)    CUES NEEDED   Biofeedback w/CC TKE - 2 pl 10/10; 24 mA stim; 50 mA target   Sport cord march - yellow    Fwd/bwd; each direction   SLS balance - SC around back of knee cueing TKE - yellow      SLS rebounder chest passes 1 100 6lb ball               Therapeutic Activity (87825)- 12      Ladders 10'     SC job/decel 2 6 bee   Squat landing 1 10    Pivot lunges 2 8 Fwd/bwd   Squat jumps 1 15    SL L box drive 1 15    Skater jumps 1   15    Y lunge directions 1 8 3 way     Access Code: [email or web address]  URL: [email or web address]  Date: 03/28/2022  Prepared by: Sid Astudillo    Exercises  Prone Knee Extension Hang - 2-3 x daily - 7 x weekly - 1 sets - 5-10 min hold  Seated Knee Extension Stretch with Chair - 2-3 x daily - 7 x weekly - 1 sets - 5-10 min hold  Seated Table Hamstring Stretch - 2-3 x daily - 7 x weekly - 1 sets - 5 reps - 20s-30s hold  Long Sitting Quad Set - 2-3 x daily - 7 x weekly - 3 sets - 10 reps  Prone Terminal Knee Extension - 2-3 x daily - 7 x weekly - 1-2 sets - 10 reps - 10s hold  Supine Knee Flexion Wall Slide - 2-3 x daily - 7 x weekly - 1 sets - 10 reps - 10s hold    Bloodflow restriction was utilized throughout exercise session with use of Occlusion Cuff for a period of 8 minutes at 150 mmHg. The Cuff was deflated every 8 minutes for reperfusion during treatment. The patient was monitored for parathesia as well as poor tolerance throughout the treatment session. Therapeutic Exercise and NMR EXR  [x] (07259) Provided verbal/tactile cueing for activities related to strengthening, flexibility, endurance, ROM for improvements in LE, proximal hip, and core control with self care, mobility, lifting, ambulation. [x] (94910) Provided verbal/tactile cueing for activities related to improving balance, coordination, kinesthetic sense, posture, motor skill, proprioception  to assist with LE, proximal hip, and core control in self care, mobility, lifting, ambulation and eccentric single leg control.      NMR and Therapeutic Activities:    [x] (93218 or 47199) Provided verbal/tactile cueing for activities related to improving balance, coordination, kinesthetic sense, posture, motor skill, proprioception and motor activation to allow for proper function of core, proximal hip and LE with self care and ADLs and functional mobility.   [] (43763) Gait Re-education- Provided training and instruction to the patient for proper LE, core and proximal hip recruitment and positioning and eccentric body weight control with ambulation re-education including up and down stairs     Home Exercise Program:    [x] (79015) Reviewed/Progressed HEP activities related to strengthening, flexibility, endurance, ROM of core, proximal hip and LE for functional self-care, mobility, lifting and ambulation/stair navigation   [] (54141)Reviewed/Progressed HEP activities related to improving balance, coordination, kinesthetic sense, posture, motor skill, proprioception of core, proximal hip and LE for self care, mobility, lifting, and ambulation/stair navigation      Manual Treatments:  PROM / STM / Oscillations-Mobs:  G-I, II, III, IV (PA's, Inf., Post.)  [x] (00974) Provided manual therapy to mobilize LE, proximal hip and/or LS spine soft tissue/joints for the purpose of modulating pain, promoting relaxation,  increasing ROM, reducing/eliminating soft tissue swelling/inflammation/restriction, improving soft tissue extensibility and allowing for proper ROM for normal function with self care, mobility, lifting and ambulation. Modalities:     [] GAME READY (VASO)- for significant edema, swelling, pain control. - 15 minutes    Charges:  Timed Code Treatment Minutes: 48   Total Treatment Minutes: 48      [] EVAL (LOW) 70138 (typically 20 minutes face-to-face)  [] EVAL (MOD) 86971 (typically 30 minutes face-to-face)  [] EVAL (HIGH) 29547 (typically 45 minutes face-to-face)  [] RE-EVAL     [x] SR(17120) x 1  [] DRY NEEDLE 1 OR 2 MUSCLES  [] NMR (57731) x    [] DRY NEEDLE 3+ MUSCLES  [x] Manual (96792) x 1     [x] TA (18033) x  1  [] Mech Traction (80148)  [] ES(attended) (78758)     [] ES (un) (64423):   [] VASO (66028)  [] Other:      GOALS:  Patient stated goal: \"get my leg straight and be able to walk without crutch\"  [] Progressing: [] Met: [x] Not Met: [] Adjusted     Therapist goals for Patient:   Short Term Goals: To be achieved in: 2 weeks  1. Independent in HEP and progression per patient tolerance, in order to prevent re-injury. [] Progressing: [x] Met: [] Not Met: [] Adjusted  2. Patient will have a decrease in pain to facilitate improvement in movement, function, and ADLs as indicated by Functional Deficits. [x] Progressing: [] Met: [] Not Met: [] Adjusted     Long Term Goals: To be achieved in: 8 weeks  1.  Disability index score of 10% or less for the LEFS to assist with reaching prior level of function. [x] Progressing: [] Met: [] Not Met: [] Adjusted  2. Patient will demonstrate increased AROM to full L knee to allow for proper joint functioning as indicated by patients Functional Deficits. [] Progressing: [] Met: [x] Not Met: [] Adjusted  3. Patient will demonstrate an increase in strength to good proximal hip strength and control, within 5lb HHD in LE to allow for proper functional mobility as indicated by patients Functional Deficits. [x] Progressing: [] Met: [] Not Met: [] Adjusted  4. Patient will return to daily functional activities without increased symptoms or restriction. [] Progressing: [x] Met: [] Not Met: [] Adjusted  5. Pt will be able to demonstrate equal strength and endurance from R to L leg with good eccentric control. [x] Progressing: [] Met: [] Not Met: [] Adjusted      ASSESSMENT: Focused on knee mobility and active mobility and flexibility work trying to improve terminal extension. Poor compliance between visits, Pt arriving late to PT sessions and lack of effort during session are negatively impacting Pt's progress. Focused exercises on functional positions for improved LE strength and NM control per trish. Return to Play: (if applicable)   [x]  Stage 1: Intro to Strength   []  Stage 2: Return to Run and Strength   []  Stage 3: Return to Jump and Strength   []  Stage 4: Dynamic Strength and Agility   []  Stage 5: Sport Specific Training     []  Ready to Return to Play, Meets All Above Stages   []  Not Ready for Return to Sports   Comments:            Treatment/Activity Tolerance:  [x] Patient tolerated treatment well [] Patient limited by fatique  [] Patient limited by pain  [] Patient limited by other medical complications  [] Other:     Overall Progression Towards Functional goals/ Treatment Progress Update:  [] Patient is progressing as expected towards functional goals listed. [x] Progression is slowed due to complexities/Impairments listed.   [] Progression has been slowed due to co-morbidities. [] Plan just implemented, too soon to assess goals progression <30days   [] Goals require adjustment due to lack of progress  [] Patient is not progressing as expected and requires additional follow up with physician  [] Other    Prognosis for POC: [x] Good [] Fair  [] Poor    Patient requires continued skilled intervention: [x] Yes  [] No        PLAN: Continue frequency 1-2x/week x8 weeks for sport specific training, eccentric control   [x] Continue per plan of care [] Alter current plan (see comments)  [] Plan of care initiated [] Hold pending MD visit [] Discharge    Electronically signed by: Cherylene Canner, PTA      Note: If patient does not return for scheduled/recommended follow up visits, this note will serve as a discharge from care along with the most recent update on progress.

## 2022-10-04 ENCOUNTER — HOSPITAL ENCOUNTER (OUTPATIENT)
Dept: PHYSICAL THERAPY | Age: 22
Setting detail: THERAPIES SERIES
Discharge: HOME | End: 2022-10-04
Payer: COMMERCIAL

## 2022-10-04 PROCEDURE — 20560 NDL INSJ W/O NJX 1 OR 2 MUSC: CPT

## 2022-10-04 PROCEDURE — 97140 MANUAL THERAPY 1/> REGIONS: CPT

## 2022-10-04 PROCEDURE — 97032 APPL MODALITY 1+ESTIM EA 15: CPT

## 2022-10-04 PROCEDURE — 97110 THERAPEUTIC EXERCISES: CPT

## 2022-10-04 NOTE — PLAN OF CARE
[place] [address] [place] [address]  Phone: [phone] Fax: [phone]        Physical Therapy Re-Certification Plan of Care/MD UPDATE      Dear  Aron Gallagher,    We had the pleasure of treating the following patient for physical therapy services at [address] [place] [address]. A summary of our findings can be found in the updated assessment below. This includes our plan of care. If you have any questions or concerns regarding these findings, please do not hesitate to contact me at the office phone number checked above.   Thank you for the referral.     Physician Signature:________________________________Date:__________________  By signing above (or electronic signature), therapists plan is approved by physician    Date Range Of Visits: 2022-10/4/2022  Total Visits to Date: 37  Overall Response to Treatment:   []Patient is responding well to treatment and improvement is noted with regards  to goals   []Patient should continue to improve in reasonable time if they continue HEP   [x]Patient has plateaued and is no longer responding to skilled PT intervention    []Patient is getting worse and would benefit from return to referring MD   []Patient unable to adhere to initial POC   [x]Other: pt continues to lack end range extension mobility and flexibility cuasing restrictions in strength and sport progressions; attempted DN today w/ improved mobility and decreased symptoms w/ extension mobilizations; discussed HEP for mobility and stretching to maintain extension gains made today      Physical Therapy Treatment Note/ Progress Report:     Date:  10/4/2022    Patient Name:  Wanda Wilkes    :  2000  MRN: [id number]  Restrictions/Precautions:    Medical/Treatment Diagnosis Information:  Diagnosis: Z48.89 encounter for other specified surgical aftercare  Treatment Diagnosis: M25.562, M25.662, M62.559, R53.1, H37.8  Insurance/Certification information:  PT Insurance Information: BCBS; DED NOT MET; DN NOT COVERED; 80/20%; 60 VISITS/YEAR, 7 USED  Physician Information:  Referring Practitioner: Yaritza Hunter  Plan of care signed (Y/N):     Date of Patient follow up with Physician: 4/11     Progress Report: [x]  Yes  []  No     Date Range for reporting period:  Beginning:  10/4/2022  Ending:      Progress report due (10 Rx/or 30 days whichever is less): 95/7/1857    Recertification due (POC duration/ or 90 days whichever is less):     Visit # Insurance Allowable Auth Needed   84 11 ( 7 used) []Yes   []No     Latex Allergy:  [x]NO      []YES  Preferred Language for Healthcare:   [x]English       []other:  Functional Scale: LEFS: 62/80  Date assessed: 10/4/2022    Pain level:  2/10     SUBJECTIVE: Patient states knee continues to feel very stiff into extension, especially after sitting for any period of time. Doesn't see his surgeon for another month. OBJECTIVE: Pt arrived 10 minutes late for today's session. Observation:      Test measurements:      ROM LEFT RIGHT   Knee ext -2 0   Knee Flex 122 135   Strength  LEFT RIGHT   HIP Abductors 36.3 37.3   Knee EXT (quad) 53.1 62.9   Knee Flex (HS) 40.6 54.0     SL STS  R: 19  L: 15    Y balance   Curtsy:3 3 inches short   Lateral: 2 inches short   Fwd: 4 inches short    RESTRICTIONS/PRECAUTIONS: s/p L ACLr w/ patellar tendon graft,  DOS: 12/17/2021 L knee scope with scar tissue debridement on 3/14/22.      Exercises/Interventions:     Therapeutic Ex (49493)-03 Sets/sec Reps Notes/CUES   BIKE 5 min     Incline calf stretch 30s 5    LLLD knee ext stretch 4'  Leg curl machine   DF mob to wall 2 10    Retro slider lunge 2 10 2 red kb's   ST walking lunge 2 15' Big ST   Lateral Slide lunge 1 10 Red kb   SL BOSU bridge 2 10 bee   Side step 2 15' white AK   Prone quad stretch 30'' 5    SL kb touches to box 2 10 Red kb, level ground, 12in box   RDL 2 10 2 blue kb, kickstand   Y balance lunging 2 10    Single leg squat to chair 2 10 Chair+Airex    Runners step up 2 10 12''   Leg Press pulses- single leg 5 5 60lb, 30-60 deg   SLS transitions 5 5 Airex, holds at 0 deg and 30 deg   TRX single leg squat hold 5sec 10 Cues to use L LE and not overuse upper body   Manual Intervention (46339)-20      Knee mobs/PROM  6 min End range ext/flexion w/ OP   Tib/Fem Mobs  6 min Posteromedial, posterolateral; prone tibial for end range flexion   Patella Mobs  6 min Restricted superiorly/inferiorly   IASTM/STM  Incisions, patella framing   Hip IR stretching  3  L   DN  10' Quad tendon; superior patella   NMR Re-education (61134)    CUES NEEDED   Biofeedback w/CC TKE - 2 pl 10/10; 24 mA stim; 50 mA target   Sport cord march - yellow    Fwd/bwd; each direction   SLS balance - SC around back of knee cueing TKE - yellow      SLS rebounder chest passes 1 100 6lb ball               Therapeutic Activity (70940)-       Ladders 10'     SC job/decel 2 6 bee   Squat landing 1 10    Pivot lunges 2 8 Fwd/bwd   Squat jumps 1 15    SL L box drive 1 15    Skater jumps 1   15    Y lunge directions 1 8 3 way     Access Code: [email or web address]  URL: [email or web address]  Date: 03/28/2022  Prepared by: Lory Barfield    Exercises  Prone Knee Extension Hang - 2-3 x daily - 7 x weekly - 1 sets - 5-10 min hold  Seated Knee Extension Stretch with Chair - 2-3 x daily - 7 x weekly - 1 sets - 5-10 min hold  Seated Table Hamstring Stretch - 2-3 x daily - 7 x weekly - 1 sets - 5 reps - 20s-30s hold  Long Sitting Quad Set - 2-3 x daily - 7 x weekly - 3 sets - 10 reps  Prone Terminal Knee Extension - 2-3 x daily - 7 x weekly - 1-2 sets - 10 reps - 10s hold  Supine Knee Flexion Wall Slide - 2-3 x daily - 7 x weekly - 1 sets - 10 reps - 10s hold    Bloodflow restriction was utilized throughout exercise session with use of Occlusion Cuff for a period of 8 minutes at 150 mmHg. The Cuff was deflated every 8 minutes for reperfusion during treatment. The patient was monitored for parathesia as well as poor tolerance throughout the treatment session. Therapeutic Exercise and NMR EXR  [x] (43489) Provided verbal/tactile cueing for activities related to strengthening, flexibility, endurance, ROM for improvements in LE, proximal hip, and core control with self care, mobility, lifting, ambulation. [x] (15876) Provided verbal/tactile cueing for activities related to improving balance, coordination, kinesthetic sense, posture, motor skill, proprioception  to assist with LE, proximal hip, and core control in self care, mobility, lifting, ambulation and eccentric single leg control.      NMR and Therapeutic Activities:    [x] (16315 or 78558) Provided verbal/tactile cueing for activities related to improving balance, coordination, kinesthetic sense, posture, motor skill, proprioception and motor activation to allow for proper function of core, proximal hip and LE with self care and ADLs and functional mobility.   [] (09144) Gait Re-education- Provided training and instruction to the patient for proper LE, core and proximal hip recruitment and positioning and eccentric body weight control with ambulation re-education including up and down stairs     Home Exercise Program:    [x] (37405) Reviewed/Progressed HEP activities related to strengthening, flexibility, endurance, ROM of core, proximal hip and LE for functional self-care, mobility, lifting and ambulation/stair navigation   [] (78166)Reviewed/Progressed HEP activities related to improving balance, coordination, kinesthetic sense, posture, motor skill, proprioception of core, proximal hip and LE for self care, mobility, lifting, and ambulation/stair navigation      Manual Treatments:  PROM / STM / Oscillations-Mobs:  G-I, II, III, IV (PA's, Inf., Post.)  [x] (54783) Provided manual therapy to mobilize LE, proximal hip and/or LS spine soft tissue/joints for the purpose of modulating pain, promoting relaxation, increasing ROM, reducing/eliminating soft tissue swelling/inflammation/restriction, improving soft tissue extensibility and allowing for proper ROM for normal function with self care, mobility, lifting and ambulation. Spoke with Dr. Evette Vazquez  regarding the use of Dry Needling      Dry needling manual therapy: consisted on the placement of 4 needles in the following muscles:  L quad tendon, superior patella. A 30-50 mm needle was inserted, piston, rotated, and coned to produce intramuscular mobilization. These techniques were used to restore functional range of motion, reduce muscle spasm and induce healing in the corresponding musculature. (80861)  Clean Technique was utilized today while applying Dry needling treatment. The treatment sites where cleaned with 70% solution of  isopropyl alcohol . The PT washed their hands and utilized treatment gloves along with hand  prior to inserting the needles. All needles where removed and discarded in the appropriate sharps container. MD has given verbal and/or written approval for this treatment. Attended low frequency (1-20Hz) electrical stimulation was utilized in conjunction with Dry Needling:  the Estim was manipulated between all above needles for a period of 10 min. at 4-6 volts. The low frequency electrical stimulation was used to help reduce muscle spasm and help to interrupt /[place] the pain cycle. (54792)        Modalities:     [] GAME READY (VASO)- for significant edema, swelling, pain control.  - 15 minutes    Charges:  Timed Code Treatment Minutes: 30   Total Treatment Minutes: 45      [] EVAL (LOW) 44703 (typically 20 minutes face-to-face)  [] EVAL (MOD) 84573 (typically 30 minutes face-to-face)  [] EVAL (HIGH) 71108 (typically 45 minutes face-to-face)  [] RE-EVAL     [x] LN(35798) x 1  [x] DRY NEEDLE 1 OR 2 MUSCLES  [] NMR (86653) x    [] DRY NEEDLE 3+ MUSCLES  [x] Manual (70400) x 1     [] TA (73418) x  1  [] Mech Traction (61489)  [x] ES(attended) (16110)     [] ES (un) (99694):   [] VASO (17295)  [] Other:      GOALS:  Patient stated goal: \"get my leg straight and be able to walk without crutch\"  [] Progressing: [] Met: [x] Not Met: [] Adjusted     Therapist goals for Patient:   Short Term Goals: To be achieved in: 2 weeks  1. Independent in HEP and progression per patient tolerance, in order to prevent re-injury. [] Progressing: [x] Met: [] Not Met: [] Adjusted  2. Patient will have a decrease in pain to facilitate improvement in movement, function, and ADLs as indicated by Functional Deficits. [x] Progressing: [] Met: [] Not Met: [] Adjusted     Long Term Goals: To be achieved in: 8 weeks  1. Disability index score of 10% or less for the LEFS to assist with reaching prior level of function. [x] Progressing: [] Met: [] Not Met: [] Adjusted  2. Patient will demonstrate increased AROM to full L knee to allow for proper joint functioning as indicated by patients Functional Deficits. [] Progressing: [] Met: [x] Not Met: [] Adjusted  3. Patient will demonstrate an increase in strength to good proximal hip strength and control, within 5lb HHD in LE to allow for proper functional mobility as indicated by patients Functional Deficits. [x] Progressing: [] Met: [] Not Met: [] Adjusted  4. Patient will return to daily functional activities without increased symptoms or restriction. [] Progressing: [x] Met: [] Not Met: [] Adjusted  5. Pt will be able to demonstrate equal strength and endurance from R to L leg with good eccentric control. [x] Progressing: [] Met: [] Not Met: [] Adjusted      ASSESSMENT: DN today given no improvement over the last few weeks w/ joint mobilizations, had significant improvement in patellar mobility and tolerance to end range extension mobilizations.      Return to Play: (if applicable)   [x]  Stage 1: Intro to Strength   []  Stage 2: Return to Run and Strength   []  Stage 3: Return to Jump and Strength   []  Stage 4: Dynamic Strength and Agility   []  Stage 5: Sport Specific Training     []  Ready to Return to Play, Meets All Above Stages   []  Not Ready for Return to Sports   Comments:            Treatment/Activity Tolerance:  [x] Patient tolerated treatment well [] Patient limited by fatique  [] Patient limited by pain  [] Patient limited by other medical complications  [] Other:     Overall Progression Towards Functional goals/ Treatment Progress Update:  [] Patient is progressing as expected towards functional goals listed. [x] Progression is slowed due to complexities/Impairments listed. [] Progression has been slowed due to co-morbidities. [] Plan just implemented, too soon to assess goals progression <30days   [] Goals require adjustment due to lack of progress  [] Patient is not progressing as expected and requires additional follow up with physician  [] Other    Prognosis for POC: [x] Good [] Fair  [] Poor    Patient requires continued skilled intervention: [x] Yes  [] No        PLAN: Continue frequency 1-2x/week x8 weeks for sport specific training, eccentric control   [x] Continue per plan of care [] Alter current plan (see comments)  [] Plan of care initiated [] Hold pending MD visit [] Discharge    Electronically signed by: Gill Witt PT      Note: If patient does not return for scheduled/recommended follow up visits, this note will serve as a discharge from care along with the most recent update on progress.

## 2022-10-06 ENCOUNTER — HOSPITAL ENCOUNTER (OUTPATIENT)
Dept: PHYSICAL THERAPY | Age: 22
Setting detail: THERAPIES SERIES
Discharge: HOME | End: 2022-10-06
Payer: COMMERCIAL

## 2022-10-06 PROCEDURE — 97110 THERAPEUTIC EXERCISES: CPT

## 2022-10-06 PROCEDURE — 97140 MANUAL THERAPY 1/> REGIONS: CPT

## 2022-10-06 PROCEDURE — 97530 THERAPEUTIC ACTIVITIES: CPT

## 2022-10-06 NOTE — FLOWSHEET NOTE
[place] [address] [address]  Phone: [phone] Fax: [phone]          Physical Therapy Treatment Note/ Progress Report:     Date:  10/6/2022    Patient Name:  Callie Severino    :  2000  MRN: [id number]  Restrictions/Precautions:    Medical/Treatment Diagnosis Information:  Diagnosis: Z48.89 encounter for other specified surgical aftercare  Treatment Diagnosis: M25.562, M25.662, M62.559, R53.1, J39.2  Insurance/Certification information:  PT Insurance Information: [employer] [address] [address]; DED NOT MET; DN NOT COVERED; 80/20%; 60 VISITS/YEAR, 7 USED  Physician Information:  Referring Practitioner: Abdullahi Bush  Plan of care signed (Y/N):     Date of Patient follow up with Physician:      Progress Report: []  Yes  [x]  No     Date Range for reporting period:  Beginning:  10/4/2022  Ending:      Progress report due (10 Rx/or 30 days whichever is less): 3815    Recertification due (POC duration/ or 90 days whichever is less):     Visit # Insurance Allowable Auth Needed   19 72 ( 7 used) []Yes   []No     Latex Allergy:  [x]NO      []YES  Preferred Language for Healthcare:   [x]English       []other:  Functional Scale: LEFS: 62/80  Date assessed: 10/4/2022    Pain level:  2/10     SUBJECTIVE: Patient states knee continues to feel very stiff into extension, especially after sitting for any period of time. Pt feels like the needling was helpful after last visit. \"I can feel my kneecap moving more. \"     OBJECTIVE: Pt arrived 6 minutes late for today's session.    Observation:      Test measurements:      ROM LEFT RIGHT   Knee ext -2 0   Knee Flex 122 135   Strength  LEFT RIGHT   HIP Abductors 36.3 37.3   Knee EXT (quad) 53.1 62.9   Knee Flex (HS) 40.6 54.0     SL STS  R: 19  L: 15    Y balance   Curtsy:3 3 inches short   Lateral: 2 inches short   Fwd: 4 inches short    RESTRICTIONS/PRECAUTIONS: s/p L ACLr w/ patellar tendon graft,  DOS: 12/17/2021 L knee scope with scar tissue debridement on 3/14/22. Exercises/Interventions:     Therapeutic Ex (62850)-30 Sets/sec Reps Notes/CUES   BIKE 5 min     Incline calf stretch 30s 5    LLLD knee ext stretch 4'  Leg curl machine   DF mob to wall 2 10    Lateral slider lunge 1 10 1 red kb   Lunge matrix holds 2 10    ST walking lunge 2 15' Big ST   Retro Slide lunge 1 10 Red kb   SL BOSU bridge 2 10 bee   Side step 2 15' Blue at ankles   Prone quad stretch 30'' 5    SL kb touches to box 2 10 Red kb, level ground, 12in box   RDL 2 10 2 blue kb, kickstand   Y balance lunging    Single leg squat to chair 2 10 Chair+Airex    Runners step up 1 10 8in+blue band behind knee   Leg Press pulses- single leg 3 10 60lb, 30-60 deg   SLS transitions 5 5 Airex, holds at 0 deg and 30 deg   TRX single leg squat hold 5sec 10 Cues to use L LE and not overuse upper body   Manual Intervention (69062)-18      Knee mobs/PROM  6 min End range ext/flexion w/ OP   Tib/Fem Mobs  6 min Posteromedial, posterolateral; prone tibial for end range flexion   Patella Mobs  6 min Restricted superiorly/inferiorly   IASTM/STM  Incisions, patella framing   Hip IR stretching  3  L   DN  10' Quad tendon; superior patella   NMR Re-education (79776)    CUES NEEDED   Biofeedback w/CC TKE - 2 pl 10/10; 24 mA stim; 50 mA target   Sport cord march - yellow    Fwd/bwd; each direction   SLS balance - SC around back of knee cueing TKE - yellow      SLS rebounder chest passes 1 100 6lb ball               Therapeutic Activity (05181)- 12 min      Ladders 10'     SC job/decel 2 6 bee   Squat landing 1 10    Pivot lunges 2 8 Fwd/bwd   Squat jumps 1 15    SL L box drive 1 15    Skater jumps 1   15    DL fwd jumps 2 10    Low quad walks c SC 5x  Green SC   Y lunge directions 1 8 3 way     Access Code: [email or web address]  URL: [email or web address]  Date: 03/28/2022  Prepared by: Toi Arenas    Exercises  Prone Knee Extension Hang - 2-3 x daily - 7 x weekly - 1 sets - 5-10 min hold  Seated Knee Extension Stretch with Chair - 2-3 x daily - 7 x weekly - 1 sets - 5-10 min hold  Seated Table Hamstring Stretch - 2-3 x daily - 7 x weekly - 1 sets - 5 reps - 20s-30s hold  Long Sitting Quad Set - 2-3 x daily - 7 x weekly - 3 sets - 10 reps  Prone Terminal Knee Extension - 2-3 x daily - 7 x weekly - 1-2 sets - 10 reps - 10s hold  Supine Knee Flexion Wall Slide - 2-3 x daily - 7 x weekly - 1 sets - 10 reps - 10s hold    Bloodflow restriction was utilized throughout exercise session with use of Occlusion Cuff for a period of 8 minutes at 150 mmHg. The Cuff was deflated every 8 minutes for reperfusion during treatment. The patient was monitored for parathesia as well as poor tolerance throughout the treatment session. Therapeutic Exercise and NMR EXR  [x] (30224) Provided verbal/tactile cueing for activities related to strengthening, flexibility, endurance, ROM for improvements in LE, proximal hip, and core control with self care, mobility, lifting, ambulation. [x] (03654) Provided verbal/tactile cueing for activities related to improving balance, coordination, kinesthetic sense, posture, motor skill, proprioception  to assist with LE, proximal hip, and core control in self care, mobility, lifting, ambulation and eccentric single leg control.      NMR and Therapeutic Activities:    [x] (33480 or 37283) Provided verbal/tactile cueing for activities related to improving balance, coordination, kinesthetic sense, posture, motor skill, proprioception and motor activation to allow for proper function of core, proximal hip and LE with self care and ADLs and functional mobility.   [] (69424) Gait Re-education- Provided training and instruction to the patient for proper LE, core and proximal hip recruitment and positioning and eccentric body weight control with ambulation re-education including up and down stairs     Home Exercise Program:    [x] (10248) Reviewed/Progressed HEP activities related to strengthening, flexibility, endurance, ROM of core, proximal hip and LE for functional self-care, mobility, lifting and ambulation/stair navigation   [] (51261)Reviewed/Progressed HEP activities related to improving balance, coordination, kinesthetic sense, posture, motor skill, proprioception of core, proximal hip and LE for self care, mobility, lifting, and ambulation/stair navigation      Manual Treatments:  PROM / STM / Oscillations-Mobs:  G-I, II, III, IV (PA's, Inf., Post.)  [x] (13380) Provided manual therapy to mobilize LE, proximal hip and/or LS spine soft tissue/joints for the purpose of modulating pain, promoting relaxation,  increasing ROM, reducing/eliminating soft tissue swelling/inflammation/restriction, improving soft tissue extensibility and allowing for proper ROM for normal function with self care, mobility, lifting and ambulation. Modalities:     [] GAME READY (VASO)- for significant edema, swelling, pain control. - 15 minutes    Charges:  Timed Code Treatment Minutes: 48   Total Treatment Minutes: 48      [] EVAL (LOW) 71106 (typically 20 minutes face-to-face)  [] EVAL (MOD) 45157 (typically 30 minutes face-to-face)  [] EVAL (HIGH) 50929 (typically 45 minutes face-to-face)  [] RE-EVAL     [x] QZ(33909) x 1  [] DRY NEEDLE 1 OR 2 MUSCLES  [] NMR (23701) x    [] DRY NEEDLE 3+ MUSCLES  [x] Manual (75415) x 1     [x] TA (54680) x  1  [] Mech Traction (37955)  [] ES(attended) (61437)     [] ES (un) (89331):   [] VASO (17926)  [] Other:      GOALS:  Patient stated goal: \"get my leg straight and be able to walk without crutch\"  [] Progressing: [] Met: [x] Not Met: [] Adjusted     Therapist goals for Patient:   Short Term Goals: To be achieved in: 2 weeks  1. Independent in HEP and progression per patient tolerance, in order to prevent re-injury. [] Progressing: [x] Met: [] Not Met: [] Adjusted  2.  Patient will have a decrease in pain to facilitate improvement in movement, function, and ADLs as indicated by Functional Deficits. [x] Progressing: [] Met: [] Not Met: [] Adjusted     Long Term Goals: To be achieved in: 8 weeks  1. Disability index score of 10% or less for the LEFS to assist with reaching prior level of function. [x] Progressing: [] Met: [] Not Met: [] Adjusted  2. Patient will demonstrate increased AROM to full L knee to allow for proper joint functioning as indicated by patients Functional Deficits. [] Progressing: [] Met: [x] Not Met: [] Adjusted  3. Patient will demonstrate an increase in strength to good proximal hip strength and control, within 5lb HHD in LE to allow for proper functional mobility as indicated by patients Functional Deficits. [x] Progressing: [] Met: [] Not Met: [] Adjusted  4. Patient will return to daily functional activities without increased symptoms or restriction. [] Progressing: [x] Met: [] Not Met: [] Adjusted  5. Pt will be able to demonstrate equal strength and endurance from R to L leg with good eccentric control. [x] Progressing: [] Met: [] Not Met: [] Adjusted      ASSESSMENT: Improved patella mobility today. Pt continues to have knee extension deficits and restricted hip mobility that may be contributing to the loss of the last few degrees of knee extension. Decreased stiffness by end of session. Pt needs constant cues for form and encouragement for proper technique.       Return to Play: (if applicable)   [x]  Stage 1: Intro to Strength   []  Stage 2: Return to Run and Strength   []  Stage 3: Return to Jump and Strength   []  Stage 4: Dynamic Strength and Agility   []  Stage 5: Sport Specific Training     []  Ready to Return to Play, Meets All Above Stages   []  Not Ready for Return to Sports   Comments:            Treatment/Activity Tolerance:  [x] Patient tolerated treatment well [] Patient limited by fatique  [] Patient limited by pain  [] Patient limited by other medical complications  [] Other:     Overall Progression Towards Functional goals/ Treatment Progress Update:  [] Patient is progressing as expected towards functional goals listed. [x] Progression is slowed due to complexities/Impairments listed. [] Progression has been slowed due to co-morbidities. [] Plan just implemented, too soon to assess goals progression <30days   [] Goals require adjustment due to lack of progress  [] Patient is not progressing as expected and requires additional follow up with physician  [] Other    Prognosis for POC: [x] Good [] Fair  [] Poor    Patient requires continued skilled intervention: [x] Yes  [] No        PLAN: Continue frequency 1-2x/week x8 weeks for sport specific training, eccentric control   [x] Continue per plan of care [] Alter current plan (see comments)  [] Plan of care initiated [] Hold pending MD visit [] Discharge    Electronically signed by: Hari Tian PTA      Note: If patient does not return for scheduled/recommended follow up visits, this note will serve as a discharge from care along with the most recent update on progress.

## 2022-10-11 ENCOUNTER — HOSPITAL ENCOUNTER (OUTPATIENT)
Dept: PHYSICAL THERAPY | Age: 22
Setting detail: THERAPIES SERIES
Discharge: HOME | End: 2022-10-11
Payer: COMMERCIAL

## 2022-10-11 PROCEDURE — 97110 THERAPEUTIC EXERCISES: CPT

## 2022-10-11 PROCEDURE — 97140 MANUAL THERAPY 1/> REGIONS: CPT

## 2022-10-11 PROCEDURE — 97530 THERAPEUTIC ACTIVITIES: CPT

## 2022-10-11 NOTE — DISCHARGE SUMMARY
[place] [address] [facility]  Phone: [phone] Fax: [phone]    Physical Therapy Treatment Note/ Progress Report:     Date:  10/11/2022    Patient Name:  Wanda Wilkes    :  2000  MRN: [id number]  Restrictions/Precautions:    Medical/Treatment Diagnosis Information:  Diagnosis: Z48.89 encounter for other specified surgical aftercare  Treatment Diagnosis: M25.562, M25.662, M62.559, R53.1, A23.0  Insurance/Certification information:  PT Insurance Information: BCBS; DED NOT MET; DN NOT COVERED; 80/20%; 60 VISITS/YEAR, 7 USED  Physician Information:  Referring Practitioner: Cinthya Landrum of care signed (Y/N):     Date of Patient follow up with Physician:      Progress Report: []  Yes  [x]  No     Date Range for reporting period:  Beginnin2022  Ending:      Progress report due (10 Rx/or 30 days whichever is less):     Recertification due (POC duration/ or 90 days whichever is less):     Visit # Insurance Allowable Auth Needed   73 04 ( 7 used) []Yes   []No     Latex Allergy:  [x]NO      []YES  Preferred Language for Healthcare:   [x]English       []other:  Functional Scale: LEFS: 50/80  Date assessed: 2022    Pain level:  2/10     SUBJECTIVE: Patient reports that his knee has been feeling tighter lately, unsure why. Also has pain in ankle. OBJECTIVE:   Observation:      Test measurements:      2022: -3-122      RESTRICTIONS/PRECAUTIONS: s/p L ACLr w/ patellar tendon graft,  DOS: 2021 L knee scope with scar tissue debridement on 3/14/22.      Exercises/Interventions:     Therapeutic Ex (06304) Sets/sec Reps Notes/CUES   BIKE 5 min     SAQ 7 deg lag over foam roll    LAQ 5s 10 Black strap to assist into full range+quad iso at top    Seated bag hang - 5# ankle weight   For L knee ext; HEP   Prone leg hang - 4# ankle weight   For L knee ext   Incline calf stretch 30s 5          BFR @80% LOP - Green cuff  175 mmHg  8 min QS (5 sec on/5 sec off x10); SLR x15; Sidelying hip abd x15; LAQ x15; minisquats x15; standing hamstring curl x15         RDL  15 Black KB   Reverse lunge w/slider 2 10    BOSU Lunge 10sec 10 QS at top   Prone TKEs      CC TKE 10s 10 3pl   SLR flexion 1 10 Cues to initiate with QS   Leg press - 2-1 ecc - 85# 2 10    Retro Slide lunge Cued to pull straight at top   Cybex hamstring curl 1 10 25#, 2-1 eccentric focus+10 second stretch into extension between reps   Step up w/ TKE on CC - 2 pl 2 10 6 inch step   Lateral TB walk - minisquatted - blue loop around knees 3 15 feet          Manual Intervention (93652)      Knee mobs/PROM  6 min End range ext/flexion w/OP   Tib/Fem Mobs  7 min Posteromedial, posterolateral; prone tibial for end range flexion   Patella Mobs  7 min Restricted superiorly/inferiorly   IASTM/STM  Incisions, patella framing   Hip IR stretching    L         NMR Re-education (11408)    CUES NEEDED   Biofeedback w/CC TKE - 2 pl  10/10; 24 mA stim; 50 mA target   Sport cord march - yellow  2 10 Fwd/bwd; each direction   SLS balance - SC around back of knee cueing TKE - yellow 20s 5    SLS ball toss on airex - 4# med ball  3 directions 2 10 Each direction                                       Therapeutic Activity (56646)      Ladders      Plyos      Dynamic Balance      Alter G TM walk/jog  10 min 1:1 x5; XS shorts, 60% WB         DN + estim  L hamstring         Access Code: [email or web address]  URL: [email or web address]  Date: 03/28/2022  Prepared by: Luis Grande    Exercises  Prone Knee Extension Hang - 2-3 x daily - 7 x weekly - 1 sets - 5-10 min hold  Seated Knee Extension Stretch with Chair - 2-3 x daily - 7 x weekly - 1 sets - 5-10 min hold  Seated Table Hamstring Stretch - 2-3 x daily - 7 x weekly - 1 sets - 5 reps - 20s-30s hold  Long Sitting Quad Set - 2-3 x daily - 7 x weekly - 3 sets - 10 reps  Prone Terminal Knee Extension - 2-3 x daily - 7 x weekly - 1-2 sets - 10 reps - 10s hold  Supine Knee Flexion Wall Slide - 2-3 x daily - 7 x weekly - 1 sets - 10 reps - 10s hold    Bloodflow restriction was utilized throughout exercise session with use of Occlusion Cuff for a period of 8 minutes at 150 mmHg. The Cuff was deflated every 8 minutes for reperfusion during treatment. The patient was monitored for parathesia as well as poor tolerance throughout the treatment session. Therapeutic Exercise and NMR EXR  [x] (11090) Provided verbal/tactile cueing for activities related to strengthening, flexibility, endurance, ROM for improvements in LE, proximal hip, and core control with self care, mobility, lifting, ambulation. [x] (25593) Provided verbal/tactile cueing for activities related to improving balance, coordination, kinesthetic sense, posture, motor skill, proprioception  to assist with LE, proximal hip, and core control in self care, mobility, lifting, ambulation and eccentric single leg control.      NMR and Therapeutic Activities:    [x] (49241 or 63045) Provided verbal/tactile cueing for activities related to improving balance, coordination, kinesthetic sense, posture, motor skill, proprioception and motor activation to allow for proper function of core, proximal hip and LE with self care and ADLs and functional mobility.   [] (50299) Gait Re-education- Provided training and instruction to the patient for proper LE, core and proximal hip recruitment and positioning and eccentric body weight control with ambulation re-education including up and down stairs     Home Exercise Program:    [x] (55343) Reviewed/Progressed HEP activities related to strengthening, flexibility, endurance, ROM of core, proximal hip and LE for functional self-care, mobility, lifting and ambulation/stair navigation   [] (11646)Reviewed/Progressed HEP activities related to improving balance, coordination, kinesthetic sense, posture, motor skill, proprioception of core, proximal hip and LE for self care, mobility, lifting, and ambulation/stair navigation      Manual Treatments:  PROM / STM / Oscillations-Mobs:  G-I, II, III, IV (PA's, Inf., Post.)  [x] (01052) Provided manual therapy to mobilize LE, proximal hip and/or LS spine soft tissue/joints for the purpose of modulating pain, promoting relaxation,  increasing ROM, reducing/eliminating soft tissue swelling/inflammation/restriction, improving soft tissue extensibility and allowing for proper ROM for normal function with self care, mobility, lifting and ambulation. Modalities:     [] GAME READY (VASO)- for significant edema, swelling, pain control. - 15 minutes    Charges:  Timed Code Treatment Minutes: 62   Total Treatment Minutes: 62      [] EVAL (LOW) 97505 (typically 20 minutes face-to-face)  [] EVAL (MOD) 49953 (typically 30 minutes face-to-face)  [] EVAL (HIGH) 67187 (typically 45 minutes face-to-face)  [] RE-EVAL     [x] BB(44414) x 2    [] DRY NEEDLE 1 OR 2 MUSCLES  [] NMR (28511) x    [] DRY NEEDLE 3+ MUSCLES  [x] Manual (37057) x 1     [x] TA (03779) x   1  [] Mech Traction (69364)  [] ES(attended) (59644)     [] ES (un) (77951):   [] VASO (04031)  [] Other:      GOALS:  Patient stated goal: \"get my leg straight and be able to walk without crutch\"  [] Progressing: [] Met: [x] Not Met: [] Adjusted     Therapist goals for Patient:   Short Term Goals: To be achieved in: 2 weeks  1. Independent in HEP and progression per patient tolerance, in order to prevent re-injury. [] Progressing: [x] Met: [] Not Met: [] Adjusted  2. Patient will have a decrease in pain to facilitate improvement in movement, function, and ADLs as indicated by Functional Deficits. [x] Progressing: [] Met: [] Not Met: [] Adjusted     Long Term Goals: To be achieved in: 8 weeks  1. Disability index score of 10% or less for the LEFS to assist with reaching prior level of function. [x] Progressing: [] Met: [] Not Met: [] Adjusted  2.  Patient will demonstrate increased AROM to full L knee to allow for proper joint functioning as indicated by patients Functional Deficits. [] Progressing: [] Met: [x] Not Met: [] Adjusted  3. Patient will demonstrate an increase in strength to good proximal hip strength and control, within 5lb HHD in LE to allow for proper functional mobility as indicated by patients Functional Deficits. [x] Progressing: [] Met: [] Not Met: [] Adjusted  4. Patient will return to daily functional activities without increased symptoms or restriction. [] Progressing: [x] Met: [] Not Met: [] Adjusted  5. Pt will be able to ambulate without crutch and with full knee extension and quad recruitment without increased symptoms or restrictions. [x] Progressing: [] Met: [] Not Met: [] Adjusted      ASSESSMENT: Good tolerance to treatment. Appropriately fatigued at conclusion. Focused on extension mobilization and quad strengthening. Return to Play: (if applicable)   [x]  Stage 1: Intro to Strength   []  Stage 2: Return to Run and Strength   []  Stage 3: Return to Jump and Strength   []  Stage 4: Dynamic Strength and Agility   []  Stage 5: Sport Specific Training     []  Ready to Return to Play, Meets All Above Stages   []  Not Ready for Return to Sports   Comments:            Treatment/Activity Tolerance:  [x] Patient tolerated treatment well [] Patient limited by fatique  [] Patient limited by pain  [] Patient limited by other medical complications  [] Other:     Overall Progression Towards Functional goals/ Treatment Progress Update:  [] Patient is progressing as expected towards functional goals listed. [x] Progression is slowed due to complexities/Impairments listed. [] Progression has been slowed due to co-morbidities.   [] Plan just implemented, too soon to assess goals progression <30days   [] Goals require adjustment due to lack of progress  [] Patient is not progressing as expected and requires additional follow up with physician  [] Other    Prognosis for POC: [x] Good [] Fair  [] Poor    Patient requires continued skilled intervention: [x] Yes  [] No        PLAN: Continue frequency 3x/week w/focus on ROM/mobility progression, strength, and functional progression  [x] Continue per plan of care [] Alter current plan (see comments)  [] Plan of care initiated [] Hold pending MD visit [] Discharge    Electronically signed by: Basil Rothman PTA,       Note: If patient does not return for scheduled/recommended follow up visits, this note will serve as a discharge from care along with the most recent update on progress.

## 2022-10-13 ENCOUNTER — HOSPITAL ENCOUNTER (OUTPATIENT)
Dept: PHYSICAL THERAPY | Age: 22
Setting detail: THERAPIES SERIES
Discharge: HOME | End: 2022-10-13
Payer: COMMERCIAL

## 2022-10-13 PROCEDURE — 97032 APPL MODALITY 1+ESTIM EA 15: CPT

## 2022-10-13 PROCEDURE — 20560 NDL INSJ W/O NJX 1 OR 2 MUSC: CPT

## 2022-10-13 PROCEDURE — 97110 THERAPEUTIC EXERCISES: CPT

## 2022-10-13 PROCEDURE — 97140 MANUAL THERAPY 1/> REGIONS: CPT

## 2022-10-13 NOTE — FLOWSHEET NOTE
[place] [address] [address] [facility] [address]  Phone: [phone] Fax: [phone]    Physical Therapy Treatment Note/ Progress Report:     Date:  10/13/2022    Patient Name:  Ulises Rowe    :  2000  MRN: [id number]  Restrictions/Precautions:    Medical/Treatment Diagnosis Information:  Diagnosis: Z48.89 encounter for other specified surgical aftercare  Treatment Diagnosis: M25.562, M25.662, M62.559, R53.1, M50.9  Insurance/Certification information:  PT Insurance Information: [employer] [address]; DED NOT MET; DN NOT COVERED; 80/20%; 60 VISITS/YEAR, 7 USED  Physician Information:  Referring Practitioner: Laron Gonzales  Plan of care signed (Y/N):     Date of Patient follow up with Physician:      Progress Report: []  Yes  [x]  No     Date Range for reporting period:  Beginnin2022  Ending:      Progress report due (10 Rx/or 30 days whichever is less): 8915    Recertification due (POC duration/ or 90 days whichever is less):     Visit # Insurance Allowable Auth Needed   31 07 ( 7 used) []Yes   []No     Latex Allergy:  [x]NO      []YES  Preferred Language for Healthcare:   [x]English       []other:  Functional Scale: LEFS: 62/80  Date assessed: 10/4/2022    Pain level:  2/10     SUBJECTIVE: Patient states knee has been feeling better since DN, would like to try to needle inferior aspect again. OBJECTIVE:   Observation:      Test measurements:      ROM LEFT RIGHT   Knee ext -2 0   Knee Flex 122 135   Strength  LEFT RIGHT   HIP Abductors 36.3 37.3   Knee EXT (quad) 53.1 62.9   Knee Flex (HS) 40.6 54.0      SL STS  R: 19  L: 15     Y balance              Curtsy:3 3 inches short              Lateral: 2 inches short              Fwd: 4 inches short           RESTRICTIONS/PRECAUTIONS: s/p L ACLr w/ patellar tendon graft,  DOS: 2021 L knee scope with scar tissue debridement on 3/14/22.      Exercises/Interventions:     Therapeutic Ex (45940) Sets/sec Reps Notes/CUES   BIKE 5 min     SAQ 7 deg lag over foam roll    LAQ 5s 10 Black strap to assist into full range+quad iso at top    Seated bag hang - 5# ankle weight   For L knee ext; HEP   Prone leg hang - 4# ankle weight   For L knee ext   Incline calf stretch 30s 5          BFR @80% LOP - Green cuff  175 mmHg  8 min QS (5 sec on/5 sec off x10); SLR x15; Sidelying hip abd x15; LAQ x15; minisquats x15; standing hamstring curl x15         RDL  15 Black KB   Reverse lunge w/slider 2 10    BOSU Lunge 10sec 10 QS at top   Prone TKEs      CC TKE 10s 10 3pl   SLR flexion 1 10 Cues to initiate with QS   Leg press - 2-1 ecc - 85# 2 10    Retro Slide lunge Cued to pull straight at top   Cybex hamstring curl 1 10 25#, 2-1 eccentric focus+10 second stretch into extension between reps   Step up w/ TKE on CC - 2 pl 2 10 6 inch step   Lateral TB walk - minisquatted - blue loop around knees 3 15 feet          Manual Intervention (42985)      Knee mobs/PROM  6 min End range ext/flexion w/OP   Tib/Fem Mobs  7 min Posteromedial, posterolateral; prone tibial for end range flexion   Patella Mobs  7 min Restricted superiorly/inferiorly   IASTM/STM  Incisions, patella framing   Hip IR stretching    L         NMR Re-education (88617)    CUES NEEDED   Biofeedback w/CC TKE - 2 pl  10/10; 24 mA stim; 50 mA target   Sport cord march - yellow  2 10 Fwd/bwd; each direction   SLS balance - SC around back of knee cueing TKE - yellow 20s 5    SLS ball toss on airex - 4# med ball  3 directions 2 10 Each direction                                       Therapeutic Activity (46666)      Ladders      Plyos      Dynamic Balance      Alter G TM walk/jog   1:1 x5; XS shorts, 60% WB         DN + estim  10 min Patellar tendon; inferior joint line       Access Code: [email or web address]  URL: [email or web address]  Date: 03/28/2022  Prepared by: Анна Glover    Exercises  Prone Knee Extension Hang - 2-3 x daily - 7 x weekly - 1 sets - 5-10 min hold  Seated Knee Extension Stretch with Chair - 2-3 x daily - 7 x weekly - 1 sets - 5-10 min hold  Seated Table Hamstring Stretch - 2-3 x daily - 7 x weekly - 1 sets - 5 reps - 20s-30s hold  Long Sitting Quad Set - 2-3 x daily - 7 x weekly - 3 sets - 10 reps  Prone Terminal Knee Extension - 2-3 x daily - 7 x weekly - 1-2 sets - 10 reps - 10s hold  Supine Knee Flexion Wall Slide - 2-3 x daily - 7 x weekly - 1 sets - 10 reps - 10s hold    Bloodflow restriction was utilized throughout exercise session with use of Occlusion Cuff for a period of 8 minutes at 150 mmHg. The Cuff was deflated every 8 minutes for reperfusion during treatment. The patient was monitored for parathesia as well as poor tolerance throughout the treatment session. Therapeutic Exercise and NMR EXR  [x] (29788) Provided verbal/tactile cueing for activities related to strengthening, flexibility, endurance, ROM for improvements in LE, proximal hip, and core control with self care, mobility, lifting, ambulation. [x] (50319) Provided verbal/tactile cueing for activities related to improving balance, coordination, kinesthetic sense, posture, motor skill, proprioception  to assist with LE, proximal hip, and core control in self care, mobility, lifting, ambulation and eccentric single leg control.      NMR and Therapeutic Activities:    [x] (66426 or 87658) Provided verbal/tactile cueing for activities related to improving balance, coordination, kinesthetic sense, posture, motor skill, proprioception and motor activation to allow for proper function of core, proximal hip and LE with self care and ADLs and functional mobility.   [] (28114) Gait Re-education- Provided training and instruction to the patient for proper LE, core and proximal hip recruitment and positioning and eccentric body weight control with ambulation re-education including up and down stairs     Home Exercise Program:    [x] (31752) Reviewed/Progressed HEP activities related to strengthening, flexibility, endurance, ROM of core, proximal hip and LE for functional self-care, mobility, lifting and ambulation/stair navigation   [] (07726)Reviewed/Progressed HEP activities related to improving balance, coordination, kinesthetic sense, posture, motor skill, proprioception of core, proximal hip and LE for self care, mobility, lifting, and ambulation/stair navigation      Manual Treatments:  PROM / STM / Oscillations-Mobs:  G-I, II, III, IV (PA's, Inf., Post.)  [x] (27716) Provided manual therapy to mobilize LE, proximal hip and/or LS spine soft tissue/joints for the purpose of modulating pain, promoting relaxation,  increasing ROM, reducing/eliminating soft tissue swelling/inflammation/restriction, improving soft tissue extensibility and allowing for proper ROM for normal function with self care, mobility, lifting and ambulation. Spoke with Dr. Jomar Ellison  regarding the use of Dry Needling      Dry needling manual therapy: consisted on the placement of 6 needles in the following muscles:  L patellar tendon, inferior joint line. A 40 mm needle was inserted, piston, rotated, and coned to produce intramuscular mobilization. These techniques were used to restore functional range of motion, reduce muscle spasm and induce healing in the corresponding musculature. (34311)  Clean Technique was utilized today while applying Dry needling treatment. The treatment sites where cleaned with 70% solution of  isopropyl alcohol . The PT washed their hands and utilized treatment gloves along with hand  prior to inserting the needles. All needles where removed and discarded in the appropriate sharps container. MD has given verbal and/or written approval for this treatment. Attended low frequency (1-20Hz) electrical stimulation was utilized in conjunction with Dry Needling:  the Estim was manipulated between all above needles for a period of 10 min. at 4-6 volts.   The low frequency electrical stimulation was used to help reduce muscle spasm and help to interrupt /Menan the pain cycle. (01484)        Modalities:     [] GAME READY (VASO)- for significant edema, swelling, pain control. - 15 minutes    Charges:  Timed Code Treatment Minutes: 35   Total Treatment Minutes: 45      [] EVAL (LOW) 22138 (typically 20 minutes face-to-face)  [] EVAL (MOD) 76929 (typically 30 minutes face-to-face)  [] EVAL (HIGH) 54152 (typically 45 minutes face-to-face)  [] RE-EVAL     [x] XL(56437) x 1    [x] DRY NEEDLE 1 OR 2 MUSCLES  [] NMR (99365) x    [] DRY NEEDLE 3+ MUSCLES  [x] Manual (92591) x 1     [] TA (72497) x   1  [] Mech Traction (19960)  [x] ES(attended) (22486)     [] ES (un) (06011):   [] VASO (62439)  [] Other:      GOALS:  Patient stated goal: \"get my leg straight and be able to walk without crutch\"  [] Progressing: [] Met: [x] Not Met: [] Adjusted     Therapist goals for Patient:   Short Term Goals: To be achieved in: 2 weeks  1. Independent in HEP and progression per patient tolerance, in order to prevent re-injury. [] Progressing: [x] Met: [] Not Met: [] Adjusted  2. Patient will have a decrease in pain to facilitate improvement in movement, function, and ADLs as indicated by Functional Deficits. [x] Progressing: [] Met: [] Not Met: [] Adjusted     Long Term Goals: To be achieved in: 8 weeks  1. Disability index score of 10% or less for the LEFS to assist with reaching prior level of function. [x] Progressing: [] Met: [] Not Met: [] Adjusted  2. Patient will demonstrate increased AROM to full L knee to allow for proper joint functioning as indicated by patients Functional Deficits. [] Progressing: [] Met: [x] Not Met: [] Adjusted  3. Patient will demonstrate an increase in strength to good proximal hip strength and control, within 5lb HHD in LE to allow for proper functional mobility as indicated by patients Functional Deficits. [x] Progressing: [] Met: [] Not Met: [] Adjusted  4.  Patient will return to daily functional activities without increased symptoms or restriction. [] Progressing: [x] Met: [] Not Met: [] Adjusted  5. Pt will be able to ambulate without crutch and with full knee extension and quad recruitment without increased symptoms or restrictions. [x] Progressing: [] Met: [] Not Met: [] Adjusted      ASSESSMENT: Good tolerance to treatment. Significant improvement in knee flexion ROM to 135 and -1 from 0 after needling and improved mobility through patella and tibfem joint. Continue to needle as able. Return to Play: (if applicable)   [x]  Stage 1: Intro to Strength   []  Stage 2: Return to Run and Strength   []  Stage 3: Return to Jump and Strength   []  Stage 4: Dynamic Strength and Agility   []  Stage 5: Sport Specific Training     []  Ready to Return to Play, Meets All Above Stages   []  Not Ready for Return to Sports   Comments:            Treatment/Activity Tolerance:  [x] Patient tolerated treatment well [] Patient limited by fatique  [] Patient limited by pain  [] Patient limited by other medical complications  [] Other:     Overall Progression Towards Functional goals/ Treatment Progress Update:  [] Patient is progressing as expected towards functional goals listed. [x] Progression is slowed due to complexities/Impairments listed. [] Progression has been slowed due to co-morbidities.   [] Plan just implemented, too soon to assess goals progression <30days   [] Goals require adjustment due to lack of progress  [] Patient is not progressing as expected and requires additional follow up with physician  [] Other    Prognosis for POC: [x] Good [] Fair  [] Poor    Patient requires continued skilled intervention: [x] Yes  [] No        PLAN: 1-2x/week x4 weeks  [x] Continue per plan of care [] Alter current plan (see comments)  [] Plan of care initiated [] Hold pending MD visit [] Discharge    Electronically signed by: Coby Red PT,       Note: If patient does not return for scheduled/recommended follow up visits, this note will serve as a discharge from care along with the most recent update on progress.

## 2022-10-18 ENCOUNTER — HOSPITAL ENCOUNTER (OUTPATIENT)
Dept: PHYSICAL THERAPY | Age: 22
Setting detail: THERAPIES SERIES
Discharge: HOME | End: 2022-10-18
Payer: COMMERCIAL

## 2022-10-18 PROCEDURE — 20560 NDL INSJ W/O NJX 1 OR 2 MUSC: CPT

## 2022-10-18 PROCEDURE — 97032 APPL MODALITY 1+ESTIM EA 15: CPT

## 2022-10-18 PROCEDURE — 97140 MANUAL THERAPY 1/> REGIONS: CPT

## 2022-10-18 NOTE — FLOWSHEET NOTE
[place] [address] [facility]  Phone: [phone] Fax: [phone]    Physical Therapy Treatment Note/ Progress Report:     Date:  10/18/2022    Patient Name:  Dee Dee Rodriguez    :  2000  MRN: [id number]  Restrictions/Precautions:    Medical/Treatment Diagnosis Information:  Diagnosis: Z48.89 encounter for other specified surgical aftercare  Treatment Diagnosis: M25.562, M25.662, M62.559, R53.1, S14.6  Insurance/Certification information:  PT Insurance Information: NoahYogi Michaelsmichaeltraci MARGARETrhettnano 150; DED NOT MET; DN NOT COVERED; 80/20%; 60 VISITS/YEAR, 7 USED  Physician Information:  Referring Practitioner: Fracisco Bates  Plan of care signed (Y/N):     Date of Patient follow up with Physician:      Progress Report: []  Yes  [x]  No     Date Range for reporting period:  Beginnin2022  Ending:      Progress report due (10 Rx/or 30 days whichever is less): 9184    Recertification due (POC duration/ or 90 days whichever is less):     Visit # Insurance Allowable Auth Needed   77 40 ( 7 used) []Yes   []No     Latex Allergy:  [x]NO      []YES  Preferred Language for Healthcare:   [x]English       []other:  Functional Scale: LEFS: 62/80  Date assessed: 10/4/2022    Pain level:  2/10     SUBJECTIVE: Pt states knee felt a little better after last session. Feels like knee cap is moving more and notices some popping through knee. OBJECTIVE:   Observation:      Test measurements:      ROM LEFT RIGHT   Knee ext -2 0   Knee Flex 122 135   Strength  LEFT RIGHT   HIP Abductors 36.3 37.3   Knee EXT (quad) 53.1 62.9   Knee Flex (HS) 40.6 54.0      SL STS  R: 19  L: 15     Y balance              Curtsy:3 3 inches short              Lateral: 2 inches short              Fwd: 4 inches short           RESTRICTIONS/PRECAUTIONS: s/p L ACLr w/ patellar tendon graft,  DOS: 2021 L knee scope with scar tissue debridement on 3/14/22.      Exercises/Interventions: Therapeutic Ex (30328) Sets/sec Reps Notes/CUES   BIKE 5 min     SAQ 7 deg lag over foam roll    LAQ 5s 10 Black strap to assist into full range+quad iso at top    Seated bag hang - 5# ankle weight   For L knee ext; HEP   Prone leg hang - 4# ankle weight   For L knee ext   Incline calf stretch 30s 5    Prone quad stretch 30'' 5    BFR @80% LOP - Green cuff  175 mmHg  8 min QS (5 sec on/5 sec off x10); SLR x15; Sidelying hip abd x15; LAQ x15; minisquats x15; standing hamstring curl x15         RDL  15 Black KB   Reverse lunge w/slider 2 10    BOSU Lunge 10sec 10 QS at top   Prone TKEs      CC TKE 10s 10 3pl   SLR flexion 1 10 Cues to initiate with QS   Leg press - 2-1 ecc - 85# 2 10    Retro Slide lunge Cued to pull straight at top   Cybex hamstring curl 1 10 25#, 2-1 eccentric focus+10 second stretch into extension between reps   Step up w/ TKE on CC - 2 pl 2 10 6 inch step   Lateral TB walk - minisquatted - blue loop around knees 3 15 feet          Manual Intervention (51424)      Knee mobs/PROM  6 min End range ext/flexion w/OP   Tib/Fem Mobs  7 min Posteromedial, posterolateral; prone tibial for end range flexion   Patella Mobs  7 min Restricted superiorly/inferiorly   IASTM/STM  Incisions, patella framing   Hip IR stretching    L         NMR Re-education (13993)    CUES NEEDED   Biofeedback w/CC TKE - 2 pl  10/10; 24 mA stim; 50 mA target   Sport cord march - yellow  2 10 Fwd/bwd; each direction   SLS balance - SC around back of knee cueing TKE - yellow 20s 5    SLS ball toss on airex - 4# med ball  3 directions 2 10 Each direction                                       Therapeutic Activity (81288)      Ladders      Plyos      Dynamic Balance      Alter G TM walk/jog   1:1 x5; XS shorts, 60% WB         DN + estim  10 min Patellar tendon; inferior joint line       Access Code: [email or web address]  URL: [email or web address]  Date: 03/28/2022  Prepared by: Miladis Dennis    Exercises  Prone Knee Extension Hang - 2-3 x daily - 7 x weekly - 1 sets - 5-10 min hold  Seated Knee Extension Stretch with Chair - 2-3 x daily - 7 x weekly - 1 sets - 5-10 min hold  Seated Table Hamstring Stretch - 2-3 x daily - 7 x weekly - 1 sets - 5 reps - 20s-30s hold  Long Sitting Quad Set - 2-3 x daily - 7 x weekly - 3 sets - 10 reps  Prone Terminal Knee Extension - 2-3 x daily - 7 x weekly - 1-2 sets - 10 reps - 10s hold  Supine Knee Flexion Wall Slide - 2-3 x daily - 7 x weekly - 1 sets - 10 reps - 10s hold    Bloodflow restriction was utilized throughout exercise session with use of Occlusion Cuff for a period of 8 minutes at 150 mmHg. The Cuff was deflated every 8 minutes for reperfusion during treatment. The patient was monitored for parathesia as well as poor tolerance throughout the treatment session. Therapeutic Exercise and NMR EXR  [x] (08129) Provided verbal/tactile cueing for activities related to strengthening, flexibility, endurance, ROM for improvements in LE, proximal hip, and core control with self care, mobility, lifting, ambulation. [x] (70824) Provided verbal/tactile cueing for activities related to improving balance, coordination, kinesthetic sense, posture, motor skill, proprioception  to assist with LE, proximal hip, and core control in self care, mobility, lifting, ambulation and eccentric single leg control.      NMR and Therapeutic Activities:    [x] (65792 or 02422) Provided verbal/tactile cueing for activities related to improving balance, coordination, kinesthetic sense, posture, motor skill, proprioception and motor activation to allow for proper function of core, proximal hip and LE with self care and ADLs and functional mobility.   [] (07704) Gait Re-education- Provided training and instruction to the patient for proper LE, core and proximal hip recruitment and positioning and eccentric body weight control with ambulation re-education including up and down stairs     Home Exercise Program:    [x] (32038) Reviewed/Progressed HEP activities related to strengthening, flexibility, endurance, ROM of core, proximal hip and LE for functional self-care, mobility, lifting and ambulation/stair navigation   [] (38946)Reviewed/Progressed HEP activities related to improving balance, coordination, kinesthetic sense, posture, motor skill, proprioception of core, proximal hip and LE for self care, mobility, lifting, and ambulation/stair navigation      Manual Treatments:  PROM / STM / Oscillations-Mobs:  G-I, II, III, IV (PA's, Inf., Post.)  [x] (69080) Provided manual therapy to mobilize LE, proximal hip and/or LS spine soft tissue/joints for the purpose of modulating pain, promoting relaxation,  increasing ROM, reducing/eliminating soft tissue swelling/inflammation/restriction, improving soft tissue extensibility and allowing for proper ROM for normal function with self care, mobility, lifting and ambulation. Spoke with Dr. Tori Escobedo  regarding the use of Dry Needling      Dry needling manual therapy: consisted on the placement of 6 needles in the following muscles:  L patellar tendon, inferior joint line. A 40 mm needle was inserted, piston, rotated, and coned to produce intramuscular mobilization. These techniques were used to restore functional range of motion, reduce muscle spasm and induce healing in the corresponding musculature. (76559)  Clean Technique was utilized today while applying Dry needling treatment. The treatment sites where cleaned with 70% solution of  isopropyl alcohol . The PT washed their hands and utilized treatment gloves along with hand  prior to inserting the needles. All needles where removed and discarded in the appropriate sharps container. MD has given verbal and/or written approval for this treatment. Attended low frequency (1-20Hz) electrical stimulation was utilized in conjunction with Dry Needling:  the Estim was manipulated between all above needles for a period of 10 min. at 4-6 volts. The low frequency electrical stimulation was used to help reduce muscle spasm and help to interrupt /[place] the pain cycle. (77161)        Modalities:     [] GAME READY (VASO)- for significant edema, swelling, pain control. - 15 minutes    Charges:  Timed Code Treatment Minutes: 35   Total Treatment Minutes: 45      [] EVAL (LOW) 65996 (typically 20 minutes face-to-face)  [] EVAL (MOD) 11745 (typically 30 minutes face-to-face)  [] EVAL (HIGH) 04940 (typically 45 minutes face-to-face)  [] RE-EVAL     [x] AR(00517) x 1    [x] DRY NEEDLE 1 OR 2 MUSCLES  [] NMR (66371) x    [] DRY NEEDLE 3+ MUSCLES  [x] Manual (30990) x 1     [] TA (37800) x   1  [] Mech Traction (63853)  [x] ES(attended) (82689)     [] ES (un) (99581):   [] VASO (78912)  [] Other:      GOALS:  Patient stated goal: \"get my leg straight and be able to walk without crutch\"  [] Progressing: [] Met: [x] Not Met: [] Adjusted     Therapist goals for Patient:   Short Term Goals: To be achieved in: 2 weeks  1. Independent in HEP and progression per patient tolerance, in order to prevent re-injury. [] Progressing: [x] Met: [] Not Met: [] Adjusted  2. Patient will have a decrease in pain to facilitate improvement in movement, function, and ADLs as indicated by Functional Deficits. [x] Progressing: [] Met: [] Not Met: [] Adjusted     Long Term Goals: To be achieved in: 8 weeks  1. Disability index score of 10% or less for the LEFS to assist with reaching prior level of function. [x] Progressing: [] Met: [] Not Met: [] Adjusted  2. Patient will demonstrate increased AROM to full L knee to allow for proper joint functioning as indicated by patients Functional Deficits. [] Progressing: [] Met: [x] Not Met: [] Adjusted  3. Patient will demonstrate an increase in strength to good proximal hip strength and control, within 5lb HHD in LE to allow for proper functional mobility as indicated by patients Functional Deficits.    [x] Progressing: [] Met: [] Not Met: [] Adjusted  4. Patient will return to daily functional activities without increased symptoms or restriction. [] Progressing: [x] Met: [] Not Met: [] Adjusted  5. Pt will be able to ambulate without crutch and with full knee extension and quad recruitment without increased symptoms or restrictions. [x] Progressing: [] Met: [] Not Met: [] Adjusted      ASSESSMENT: Good tolerance to treatment. Posteromedial corner limited in mobility today, however generally moving better since DN. Continue to needle as able. Return to Play: (if applicable)   [x]  Stage 1: Intro to Strength   []  Stage 2: Return to Run and Strength   []  Stage 3: Return to Jump and Strength   []  Stage 4: Dynamic Strength and Agility   []  Stage 5: Sport Specific Training     []  Ready to Return to Play, Meets All Above Stages   []  Not Ready for Return to Sports   Comments:            Treatment/Activity Tolerance:  [x] Patient tolerated treatment well [] Patient limited by fatique  [] Patient limited by pain  [] Patient limited by other medical complications  [] Other:     Overall Progression Towards Functional goals/ Treatment Progress Update:  [] Patient is progressing as expected towards functional goals listed. [x] Progression is slowed due to complexities/Impairments listed. [] Progression has been slowed due to co-morbidities.   [] Plan just implemented, too soon to assess goals progression <30days   [] Goals require adjustment due to lack of progress  [] Patient is not progressing as expected and requires additional follow up with physician  [] Other    Prognosis for POC: [x] Good [] Fair  [] Poor    Patient requires continued skilled intervention: [x] Yes  [] No        PLAN: 1-2x/week x4 weeks  [x] Continue per plan of care [] Alter current plan (see comments)  [] Plan of care initiated [] Hold pending MD visit [] Discharge    Electronically signed by: Marie Zimmerman PT,       Note: If patient does not return for scheduled/recommended follow up visits, this note will serve as a discharge from care along with the most recent update on progress.

## 2022-10-20 ENCOUNTER — HOSPITAL ENCOUNTER (OUTPATIENT)
Dept: PHYSICAL THERAPY | Age: 22
Setting detail: THERAPIES SERIES
Discharge: HOME | End: 2022-10-20
Payer: COMMERCIAL

## 2022-10-20 PROCEDURE — 97110 THERAPEUTIC EXERCISES: CPT

## 2022-10-20 PROCEDURE — 97140 MANUAL THERAPY 1/> REGIONS: CPT

## 2022-10-20 NOTE — FLOWSHEET NOTE
[place] [address] [place]  Phone: [phone] Fax: [phone]    Physical Therapy Treatment Note/ Progress Report:     Date:  10/20/2022    Patient Name:  Ed Resendez    :  2000  MRN: [id number]  Restrictions/Precautions:    Medical/Treatment Diagnosis Information:  Diagnosis: Z48.89 encounter for other specified surgical aftercare  Treatment Diagnosis: M25.562, M25.662, M62.559, R53.1, N84.1  Insurance/Certification information:  PT Insurance Information: Lonza Inks; DED NOT MET; DN NOT COVERED; 80/20%; 60 VISITS/YEAR, 7 USED  Physician Information:  Referring Practitioner: Nicolas Aguilera signed (Y/N):     Date of Patient follow up with Physician:      Progress Report: []  Yes  [x]  No     Date Range for reporting period:  Beginnin2022  Ending:      Progress report due (10 Rx/or 30 days whichever is less):     Recertification due (POC duration/ or 90 days whichever is less):     Visit # Insurance Allowable Auth Needed   72 13 ( 7 used) []Yes   []No     Latex Allergy:  [x]NO      []YES  Preferred Language for Healthcare:   [x]English       []other:  Functional Scale: LEFS: 62/80  Date assessed: 10/4/2022    Pain level:  2/10     SUBJECTIVE: Pt states the knee cap is moving more and notices some popping through knee. Some hip tightness continues. Pt notices that sometimes a heavy feeling occurs when he is working his leg that sometimes starts at his back and goes down to his foot. Pt describes this as an achy feeling.         OBJECTIVE:   Observation:      Test measurements:      ROM LEFT RIGHT   Knee ext -2 0   Knee Flex 122 135   Strength  LEFT RIGHT   HIP Abductors 36.3 37.3   Knee EXT (quad) 53.1 62.9   Knee Flex (HS) 40.6 54.0      SL STS  R: 19  L: 15     Y balance              Curtsy:3 3 inches short              Lateral: 2 inches short              Fwd: 4 inches short RESTRICTIONS/PRECAUTIONS: s/p L ACLr w/ patellar tendon graft,  DOS: 12/17/2021 L knee scope with scar tissue debridement on 3/14/22.      Exercises/Interventions:     Therapeutic Ex (56315) x 18 min Sets/sec Reps Notes/CUES   BIKE 5 min     SAQ 7 deg lag over foam roll    LAQ 5s 10 Black strap to assist into full range+quad iso at top    Seated bag hang - 5# ankle weight   For L knee ext; HEP   Prone leg hang - 4# ankle weight   For L knee ext   Incline calf stretch 30s 5    Prone quad stretch 30'' 5    Dave stretch/sidelying hip flexor stretch 30'' 3    BFR @80% LOP - Green cuff  175 mmHg  8 min QS (5 sec on/5 sec off x10); SLR x15; Sidelying hip abd x15; LAQ x15; minisquats x15; standing hamstring curl x15   Band side stepping 2 15 Black tube at ankles   March c TKE c band 1 10 Blue band behind knee   Slide lunge c TKE c band 1 10 Blue band behind knee   RDL  15 Black KB   Reverse lunge w/slider 2 10    BOSU Lunge 10sec 10 QS at top   Prone TKEs      CC TKE 10s 10 3pl   SLR flexion 1 10 Cues to initiate with QS   Leg press - 2-1 ecc - 85# 2 10    Retro Slide lunge Cued to pull straight at top   Cybex hamstring curl 1 10 25#, 2-1 eccentric focus+10 second stretch into extension between reps   Step up w/ TKE on CC - 2 pl 2 10 6 inch step   Lateral TB walk - minisquatted - blue loop around knees 3 15 feet          Manual Intervention (36972) x 26 min      Knee mobs/PROM  6 min End range ext/flexion w/OP   Tib/Fem Mobs  6 min Posteromedial, posterolateral; prone tibial for end range flexion   Patella Mobs  6 min Restricted superiorly/inferiorly   IASTM/STM  Incisions, patella framing   Hip IR  stretching  4 min  L   Hip LAD  4 min    NMR Re-education (99672)    CUES NEEDED   Biofeedback w/CC TKE - 2 pl  10/10; 24 mA stim; 50 mA target   Sport cord march - yellow  2 10 Fwd/bwd; each direction   SLS balance - SC around back of knee cueing TKE - yellow 20s 5    SLS ball toss on airex - 4# med ball  3 directions 2 10 Each direction                                       Therapeutic Activity (32758)      Ladders      Plyos      Dynamic Balance      Alter G TM walk/jog   1:1 x5; XS shorts, 60% WB         DN + estim  10 min Patellar tendon; inferior joint line       Access Code: [email or web address]  URL: [email or web address]  Date: 03/28/2022  Prepared by: Miladis Dennis    Exercises  Prone Knee Extension Hang - 2-3 x daily - 7 x weekly - 1 sets - 5-10 min hold  Seated Knee Extension Stretch with Chair - 2-3 x daily - 7 x weekly - 1 sets - 5-10 min hold  Seated Table Hamstring Stretch - 2-3 x daily - 7 x weekly - 1 sets - 5 reps - 20s-30s hold  Long Sitting Quad Set - 2-3 x daily - 7 x weekly - 3 sets - 10 reps  Prone Terminal Knee Extension - 2-3 x daily - 7 x weekly - 1-2 sets - 10 reps - 10s hold  Supine Knee Flexion Wall Slide - 2-3 x daily - 7 x weekly - 1 sets - 10 reps - 10s hold    Bloodflow restriction was utilized throughout exercise session with use of Occlusion Cuff for a period of 8 minutes at 150 mmHg. The Cuff was deflated every 8 minutes for reperfusion during treatment. The patient was monitored for parathesia as well as poor tolerance throughout the treatment session. Therapeutic Exercise and NMR EXR  [x] (68585) Provided verbal/tactile cueing for activities related to strengthening, flexibility, endurance, ROM for improvements in LE, proximal hip, and core control with self care, mobility, lifting, ambulation. [x] (39494) Provided verbal/tactile cueing for activities related to improving balance, coordination, kinesthetic sense, posture, motor skill, proprioception  to assist with LE, proximal hip, and core control in self care, mobility, lifting, ambulation and eccentric single leg control.      NMR and Therapeutic Activities:    [x] (17809 or 15882) Provided verbal/tactile cueing for activities related to improving balance, coordination, kinesthetic sense, posture, motor skill, proprioception and motor activation to allow for proper function of core, proximal hip and LE with self care and ADLs and functional mobility.   [] (50542) Gait Re-education- Provided training and instruction to the patient for proper LE, core and proximal hip recruitment and positioning and eccentric body weight control with ambulation re-education including up and down stairs     Home Exercise Program:    [x] (75335) Reviewed/Progressed HEP activities related to strengthening, flexibility, endurance, ROM of core, proximal hip and LE for functional self-care, mobility, lifting and ambulation/stair navigation   [] (73911)Reviewed/Progressed HEP activities related to improving balance, coordination, kinesthetic sense, posture, motor skill, proprioception of core, proximal hip and LE for self care, mobility, lifting, and ambulation/stair navigation      Manual Treatments:  PROM / STM / Oscillations-Mobs:  G-I, II, III, IV (PA's, Inf., Post.)  [x] (99265) Provided manual therapy to mobilize LE, proximal hip and/or LS spine soft tissue/joints for the purpose of modulating pain, promoting relaxation,  increasing ROM, reducing/eliminating soft tissue swelling/inflammation/restriction, improving soft tissue extensibility and allowing for proper ROM for normal function with self care, mobility, lifting and ambulation. Modalities:     [x] GAME READY (VASO)- for significant edema, swelling, pain control.  - 10 minutes    Charges:  Timed Code Treatment Minutes: 44   Total Treatment Minutes: 54      [] EVAL (LOW) 48148 (typically 20 minutes face-to-face)  [] EVAL (MOD) 16734 (typically 30 minutes face-to-face)  [] EVAL (HIGH) 43700 (typically 45 minutes face-to-face)  [] RE-EVAL     [x] SD(17980) x 1    [] DRY NEEDLE 1 OR 2 MUSCLES  [] NMR (47204) x    [] DRY NEEDLE 3+ MUSCLES  [x] Manual (77400) x 2    [] TA (84585) x     [] [facility] (91440)  [] ES(attended) (62507)     [] ES (un) (09804):   [] VASO (16462)  [] Other:      GOALS:  Patient stated goal: \"get my leg straight and be able to walk without crutch\"  [] Progressing: [] Met: [x] Not Met: [] Adjusted     Therapist goals for Patient:   Short Term Goals: To be achieved in: 2 weeks  1. Independent in HEP and progression per patient tolerance, in order to prevent re-injury. [] Progressing: [x] Met: [] Not Met: [] Adjusted  2. Patient will have a decrease in pain to facilitate improvement in movement, function, and ADLs as indicated by Functional Deficits. [x] Progressing: [] Met: [] Not Met: [] Adjusted     Long Term Goals: To be achieved in: 8 weeks  1. Disability index score of 10% or less for the LEFS to assist with reaching prior level of function. [x] Progressing: [] Met: [] Not Met: [] Adjusted  2. Patient will demonstrate increased AROM to full L knee to allow for proper joint functioning as indicated by patients Functional Deficits. [] Progressing: [] Met: [x] Not Met: [] Adjusted  3. Patient will demonstrate an increase in strength to good proximal hip strength and control, within 5lb HHD in LE to allow for proper functional mobility as indicated by patients Functional Deficits. [x] Progressing: [] Met: [] Not Met: [] Adjusted  4. Patient will return to daily functional activities without increased symptoms or restriction. [] Progressing: [x] Met: [] Not Met: [] Adjusted  5. Pt will be able to ambulate without crutch and with full knee extension and quad recruitment without increased symptoms or restrictions. [x] Progressing: [] Met: [] Not Met: [] Adjusted      ASSESSMENT: Good tolerance to treatment. Posteromedial corner limited in mobility today, however generally moving better since DN last visit. Continue to needle as able. Less hip restriction noted with knee extension mobs.     Return to Play: (if applicable)   [x]  Stage 1: Intro to Strength   []  Stage 2: Return to Run and Strength   []  Stage 3: Return to Jump and Strength   []  Stage 4: Dynamic Strength and Agility   []  Stage 5: Sport Specific Training     []  Ready to Return to Play, Meets All Above Stages   []  Not Ready for Return to Sports   Comments:            Treatment/Activity Tolerance:  [x] Patient tolerated treatment well [] Patient limited by fatique  [] Patient limited by pain  [] Patient limited by other medical complications  [] Other:     Overall Progression Towards Functional goals/ Treatment Progress Update:  [] Patient is progressing as expected towards functional goals listed. [x] Progression is slowed due to complexities/Impairments listed. [] Progression has been slowed due to co-morbidities. [] Plan just implemented, too soon to assess goals progression <30days   [] Goals require adjustment due to lack of progress  [] Patient is not progressing as expected and requires additional follow up with physician  [] Other    Prognosis for POC: [x] Good [] Fair  [] Poor    Patient requires continued skilled intervention: [x] Yes  [] No        PLAN: 1-2x/week x4 weeks  [x] Continue per plan of care [] Alter current plan (see comments)  [] Plan of care initiated [] Hold pending MD visit [] Discharge    Electronically signed by: Wilmer Camp PTA,       Note: If patient does not return for scheduled/recommended follow up visits, this note will serve as a discharge from care along with the most recent update on progress.

## 2022-10-25 ENCOUNTER — HOSPITAL ENCOUNTER (OUTPATIENT)
Dept: PHYSICAL THERAPY | Age: 22
Setting detail: THERAPIES SERIES
Discharge: HOME | End: 2022-10-25
Payer: COMMERCIAL

## 2022-10-25 PROCEDURE — 20560 NDL INSJ W/O NJX 1 OR 2 MUSC: CPT

## 2022-10-25 PROCEDURE — 97032 APPL MODALITY 1+ESTIM EA 15: CPT

## 2022-10-25 PROCEDURE — 97140 MANUAL THERAPY 1/> REGIONS: CPT

## 2022-10-25 NOTE — FLOWSHEET NOTE
[place] [address] [facility]  Phone: [phone] Fax: [phone]    Physical Therapy Treatment Note/ Progress Report:     Date:  10/25/2022    Patient Name:  Shelly Vinson    :  2000  MRN: [id number]  Restrictions/Precautions:    Medical/Treatment Diagnosis Information:  Diagnosis: Z48.89 encounter for other specified surgical aftercare  Treatment Diagnosis: M25.562, M25.662, M62.559, R53.1, O56.2  Insurance/Certification information:  PT Insurance Information: [employer] [address]; DED NOT MET; DN NOT COVERED; 80/20%; 60 VISITS/YEAR, 7 USED  Physician Information:  Referring Practitioner: Ashok Torres  Plan of care signed (Y/N):     Date of Patient follow up with Physician:      Progress Report: []  Yes  [x]  No     Date Range for reporting period:  Beginnin2022  Ending:      Progress report due (10 Rx/or 30 days whichever is less):     Recertification due (POC duration/ or 90 days whichever is less):     Visit # Insurance Allowable Auth Needed   97 61 ( 7 used) []Yes   []No     Latex Allergy:  [x]NO      []YES  Preferred Language for Healthcare:   [x]English       []other:  Functional Scale: LEFS: 62/80  Date assessed: 10/4/2022    Pain level:  2/10     SUBJECTIVE: Pt states he feels better after DN and feels like knee is moving a little bit more but overall still very frustrated w/ function and ROM. OBJECTIVE:   Observation:      Test measurements:      ROM LEFT RIGHT   Knee ext -2 0   Knee Flex 122 135   Strength  LEFT RIGHT   HIP Abductors 36.3 37.3   Knee EXT (quad) 53.1 62.9   Knee Flex (HS) 40.6 54.0      SL STS  R: 19  L: 15     Y balance              Curtsy:3 3 inches short              Lateral: 2 inches short              Fwd: 4 inches short           RESTRICTIONS/PRECAUTIONS: s/p L ACLr w/ patellar tendon graft,  DOS: 2021 L knee scope with scar tissue debridement on 3/14/22. Exercises/Interventions:     Therapeutic Ex (33633) x 18 min Sets/sec Reps Notes/CUES   BIKE 5 min     SAQ 7 deg lag over foam roll    LAQ 5s 10 Black strap to assist into full range+quad iso at top    Seated bag hang - 5# ankle weight   For L knee ext; HEP   Prone leg hang - 4# ankle weight   For L knee ext   Incline calf stretch 30s 5    Prone quad stretch 30'' 5    Dave stretch/sidelying hip flexor stretch 30'' 3    BFR @80% LOP - Green cuff  175 mmHg  8 min QS (5 sec on/5 sec off x10); SLR x15; Sidelying hip abd x15; LAQ x15; minisquats x15; standing hamstring curl x15   Band side stepping 2 15 Black tube at ankles   March c TKE c band 1 10 Blue band behind knee   Slide lunge c TKE c band 1 10 Blue band behind knee   RDL  15 Black KB   Reverse lunge w/slider 2 10    BOSU Lunge 10sec 10 QS at top   Prone TKEs      CC TKE 10s 10 3pl   SLR flexion 1 10 Cues to initiate with QS   Leg press - 2-1 ecc - 85# 2 10    Retro Slide lunge Cued to pull straight at top   Cybex hamstring curl 1 10 25#, 2-1 eccentric focus+10 second stretch into extension between reps   Step up w/ TKE on CC - 2 pl 2 10 6 inch step   Lateral TB walk - minisquatted - blue loop around knees 3 15 feet          Manual Intervention (18502) x 26 min      Knee mobs/PROM  6 min End range ext/flexion w/OP   Tib/Fem Mobs  6 min Posteromedial, posterolateral; prone tibial for end range flexion   Patella Mobs  6 min Restricted superiorly/inferiorly   IASTM/STM  Incisions, patella framing   Hip IR  stretching  4 min  L   Hip LAD  4 min    NMR Re-education (85261)    CUES NEEDED   Biofeedback w/CC TKE - 2 pl  10/10; 24 mA stim; 50 mA target   Sport cord march - yellow  2 10 Fwd/bwd; each direction   SLS balance - SC around back of knee cueing TKE - yellow 20s 5    SLS ball toss on airex - 4# med ball  3 directions 2 10 Each direction                                       Therapeutic Activity (11307)      Ladders      Plyos      Dynamic Balance Kavin WISE TM walk/jog   1:1 x5; XS shorts, 60% WB         DN + estim  10 min Patellar tendon; inferior joint line       Access Code: [email or web address]  URL: [email or web address]  Date: 03/28/2022  Prepared by: Minh Veras    Exercises  Prone Knee Extension Hang - 2-3 x daily - 7 x weekly - 1 sets - 5-10 min hold  Seated Knee Extension Stretch with Chair - 2-3 x daily - 7 x weekly - 1 sets - 5-10 min hold  Seated Table Hamstring Stretch - 2-3 x daily - 7 x weekly - 1 sets - 5 reps - 20s-30s hold  Long Sitting Quad Set - 2-3 x daily - 7 x weekly - 3 sets - 10 reps  Prone Terminal Knee Extension - 2-3 x daily - 7 x weekly - 1-2 sets - 10 reps - 10s hold  Supine Knee Flexion Wall Slide - 2-3 x daily - 7 x weekly - 1 sets - 10 reps - 10s hold    Bloodflow restriction was utilized throughout exercise session with use of Occlusion Cuff for a period of 8 minutes at 150 mmHg. The Cuff was deflated every 8 minutes for reperfusion during treatment. The patient was monitored for parathesia as well as poor tolerance throughout the treatment session. Therapeutic Exercise and NMR EXR  [x] (84733) Provided verbal/tactile cueing for activities related to strengthening, flexibility, endurance, ROM for improvements in LE, proximal hip, and core control with self care, mobility, lifting, ambulation. [x] (57008) Provided verbal/tactile cueing for activities related to improving balance, coordination, kinesthetic sense, posture, motor skill, proprioception  to assist with LE, proximal hip, and core control in self care, mobility, lifting, ambulation and eccentric single leg control.      NMR and Therapeutic Activities:    [x] (69979 or 66036) Provided verbal/tactile cueing for activities related to improving balance, coordination, kinesthetic sense, posture, motor skill, proprioception and motor activation to allow for proper function of core, proximal hip and LE with self care and ADLs and functional mobility.   [] (66628) Gait Re-education- Provided training and instruction to the patient for proper LE, core and proximal hip recruitment and positioning and eccentric body weight control with ambulation re-education including up and down stairs     Home Exercise Program:    [x] (85565) Reviewed/Progressed HEP activities related to strengthening, flexibility, endurance, ROM of core, proximal hip and LE for functional self-care, mobility, lifting and ambulation/stair navigation   [] (25997)Reviewed/Progressed HEP activities related to improving balance, coordination, kinesthetic sense, posture, motor skill, proprioception of core, proximal hip and LE for self care, mobility, lifting, and ambulation/stair navigation      Manual Treatments:  PROM / STM / Oscillations-Mobs:  G-I, II, III, IV (PA's, Inf., Post.)  [x] (60843) Provided manual therapy to mobilize LE, proximal hip and/or LS spine soft tissue/joints for the purpose of modulating pain, promoting relaxation,  increasing ROM, reducing/eliminating soft tissue swelling/inflammation/restriction, improving soft tissue extensibility and allowing for proper ROM for normal function with self care, mobility, lifting and ambulation. poke with Dr. Dianna Lobato  regarding the use of Dry Needling      Dry needling manual therapy: consisted on the placement of 6 needles in the following muscles:  L patellar tendon, inferior joint line. A 40 mm needle was inserted, piston, rotated, and coned to produce intramuscular mobilization. These techniques were used to restore functional range of motion, reduce muscle spasm and induce healing in the corresponding musculature. (01480)  Clean Technique was utilized today while applying Dry needling treatment. The treatment sites where cleaned with 70% solution of  isopropyl alcohol . The PT washed their hands and utilized treatment gloves along with hand  prior to inserting the needles.   All needles where removed and discarded in the appropriate sharps container. MD has given verbal and/or written approval for this treatment. Attended low frequency (1-20Hz) electrical stimulation was utilized in conjunction with Dry Needling:  the Estim was manipulated between all above needles for a period of 10 min. at 4-6 volts. The low frequency electrical stimulation was used to help reduce muscle spasm and help to interrupt /Cisco the pain cycle. (63228)        Modalities:     [x] GAME READY (VASO)- for significant edema, swelling, pain control. - 10 minutes    Charges:  Timed Code Treatment Minutes: 30   Total Treatment Minutes: 50      [] EVAL (LOW) 33558 (typically 20 minutes face-to-face)  [] EVAL (MOD) 84236 (typically 30 minutes face-to-face)  [] EVAL (HIGH) 56779 (typically 45 minutes face-to-face)  [] RE-EVAL     [] WT(81546) x 1    [x] DRY NEEDLE 1 OR 2 MUSCLES  [] NMR (54553) x    [] DRY NEEDLE 3+ MUSCLES  [x] Manual (76842) x 1    [] TA (34615) x     [] Mech Traction (01500)  [x] ES(attended) (74415)     [] ES (un) (19729):   [] VASO (35721)  [] Other:      GOALS:  Patient stated goal: \"get my leg straight and be able to walk without crutch\"  [] Progressing: [] Met: [x] Not Met: [] Adjusted     Therapist goals for Patient:   Short Term Goals: To be achieved in: 2 weeks  1. Independent in HEP and progression per patient tolerance, in order to prevent re-injury. [] Progressing: [x] Met: [] Not Met: [] Adjusted  2. Patient will have a decrease in pain to facilitate improvement in movement, function, and ADLs as indicated by Functional Deficits. [x] Progressing: [] Met: [] Not Met: [] Adjusted     Long Term Goals: To be achieved in: 8 weeks  1. Disability index score of 10% or less for the LEFS to assist with reaching prior level of function. [x] Progressing: [] Met: [] Not Met: [] Adjusted  2. Patient will demonstrate increased AROM to full L knee to allow for proper joint functioning as indicated by patients Functional Deficits.    [] Progressing: [] Met: [x] Not Met: [] Adjusted  3. Patient will demonstrate an increase in strength to good proximal hip strength and control, within 5lb HHD in LE to allow for proper functional mobility as indicated by patients Functional Deficits. [x] Progressing: [] Met: [] Not Met: [] Adjusted  4. Patient will return to daily functional activities without increased symptoms or restriction. [] Progressing: [x] Met: [] Not Met: [] Adjusted  5. Pt will be able to ambulate without crutch and with full knee extension and quad recruitment without increased symptoms or restrictions. [x] Progressing: [] Met: [] Not Met: [] Adjusted      ASSESSMENT: Good tolerance to treatment. Global tightness around knee, addressed w/ DN, manual, and stretching today. Decreasing frequency of visits 2/2 insurance limitations. Continue to address mobility as long as DN and manual continue to help, hold if improvement stops. Return to Play: (if applicable)   [x]  Stage 1: Intro to Strength   []  Stage 2: Return to Run and Strength   []  Stage 3: Return to Jump and Strength   []  Stage 4: Dynamic Strength and Agility   []  Stage 5: Sport Specific Training     []  Ready to Return to Play, Meets All Above Stages   []  Not Ready for Return to Sports   Comments:            Treatment/Activity Tolerance:  [x] Patient tolerated treatment well [] Patient limited by fatique  [] Patient limited by pain  [] Patient limited by other medical complications  [] Other:     Overall Progression Towards Functional goals/ Treatment Progress Update:  [] Patient is progressing as expected towards functional goals listed. [x] Progression is slowed due to complexities/Impairments listed. [] Progression has been slowed due to co-morbidities.   [] Plan just implemented, too soon to assess goals progression <30days   [] Goals require adjustment due to lack of progress  [] Patient is not progressing as expected and requires additional follow up with physician  [] Other    Prognosis for POC: [x] Good [] Fair  [] Poor    Patient requires continued skilled intervention: [x] Yes  [] No        PLAN: 1x/week x4 weeks  [x] Continue per plan of care [] Alter current plan (see comments)  [] Plan of care initiated [] Hold pending MD visit [] Discharge    Electronically signed by: Jasmin Bains PT,       Note: If patient does not return for scheduled/recommended follow up visits, this note will serve as a discharge from care along with the most recent update on progress.

## 2022-10-27 ENCOUNTER — APPOINTMENT (OUTPATIENT)
Dept: PHYSICAL THERAPY | Age: 22
End: 2022-10-27
Payer: COMMERCIAL

## 2022-11-01 ENCOUNTER — HOSPITAL ENCOUNTER (OUTPATIENT)
Dept: PHYSICAL THERAPY | Age: 22
Setting detail: THERAPIES SERIES
Discharge: HOME | End: 2022-11-01
Payer: COMMERCIAL

## 2022-11-01 PROCEDURE — 97032 APPL MODALITY 1+ESTIM EA 15: CPT

## 2022-11-01 PROCEDURE — 97140 MANUAL THERAPY 1/> REGIONS: CPT

## 2022-11-01 PROCEDURE — 20560 NDL INSJ W/O NJX 1 OR 2 MUSC: CPT

## 2022-11-01 PROCEDURE — 97110 THERAPEUTIC EXERCISES: CPT

## 2022-11-01 NOTE — FLOWSHEET NOTE
[place] [address] [address]  Phone: [phone] Fax: [phone]    Physical Therapy Treatment Note/ Progress Report:     Date:  2022    Patient Name:  Jason Ledesma    :  2000  MRN: [id number]  Restrictions/Precautions:    Medical/Treatment Diagnosis Information:  Diagnosis: Z48.89 encounter for other specified surgical aftercare  Treatment Diagnosis: M25.562, M25.662, M62.559, R53.1, Y39.7  Insurance/Certification information:  PT Insurance Information: Robbieosmin Pachecos; DED NOT MET; DN NOT COVERED; 80/20%; 60 VISITS/YEAR, 7 USED  Physician Information:  Referring Practitioner: Fran Valadez of care signed (Y/N):     Date of Patient follow up with Physician:      Progress Report: []  Yes  [x]  No     Date Range for reporting period:  Beginnin2022  Ending:      Progress report due (10 Rx/or 30 days whichever is less): 5698    Recertification due (POC duration/ or 90 days whichever is less):     Visit # Insurance Allowable Auth Needed   41 63 []Yes   []No     Latex Allergy:  [x]NO      []YES  Preferred Language for Healthcare:   [x]English       []other:  Functional Scale: LEFS: 62/80  Date assessed: 10/4/2022    Pain level:  2/10     SUBJECTIVE: Feels like he is seeing improvement in the ease of moving his L knee. Feels like those improvements are lasting longer periods of time between each session. OBJECTIVE:   Observation:      Test measurements:      ROM LEFT RIGHT   Knee ext -2 0   Knee Flex 122 135   Strength  LEFT RIGHT   HIP Abductors 36.3 37.3   Knee EXT (quad) 53.1 62.9   Knee Flex (HS) 40.6 54.0      SL STS  R: 19  L: 15     Y balance              Curtsy:3 3 inches short              Lateral: 2 inches short              Fwd: 4 inches short           RESTRICTIONS/PRECAUTIONS: s/p L ACLr w/ patellar tendon graft,  DOS: 2021 L knee scope with scar tissue debridement on 3/14/22. Exercises/Interventions:     Therapeutic Ex (27469) Sets/sec Reps Notes/CUES   BIKE 5 min     SAQ 7 deg lag over foam roll    LAQ 5s 10 Black strap to assist into full range+quad iso at top    Seated bag hang - 5# ankle weight   For L knee ext; HEP   Prone leg hang - 4# ankle weight   For L knee ext   Incline calf stretch 30s 5    Prone quad stretch 30'' 5    Dave stretch/sidelying hip flexor stretch 30'' 3    BFR @80% LOP - Green cuff  175 mmHg  8 min QS (5 sec on/5 sec off x10); SLR x15; Sidelying hip abd x15; LAQ x15; minisquats x15; standing hamstring curl x15   Band side stepping 2 15 Black tube at ankles   March c TKE c band 2 10 Blue band behind knee   Slide lunge c TKE c band 1 15 Blue band behind knee   RDL  15 Black KB   Reverse lunge w/slider 2 10    BOSU Lunge 10sec 10 QS at top   Prone TKEs      CC TKE 10s 10 3pl   SLR flexion 1 10 Cues to initiate with QS   Leg press - 2-1 ecc - 85# 2 10    Retro Slide lunge Cued to pull straight at top   Cybex hamstring curl 1 10 25#, 2-1 eccentric focus+10 second stretch into extension between reps   Step up w/ TKE on CC - 2 pl 2 10 6 inch step   Lateral TB walk - minisquatted - blue loop around knees 3 15 feet          Manual Intervention (83954)      Knee mobs/PROM  8 min End range ext/flexion w/OP   Tib/Fem Mobs  8 min Posteromedial, posterolateral; prone tibial for end range flexion   Patella Mobs  6 min Restricted superiorly/inferiorly   IASTM/STM  Incisions, patella framing   Hip IR  stretching  4 min  L   Hip LAD  4 min          NMR Re-education (07106)    CUES NEEDED   Biofeedback w/CC TKE - 2 pl  10/10; 24 mA stim; 50 mA target   Sport cord march - yellow  2 10 Fwd/bwd; each direction   SLS balance - SC around back of knee cueing TKE - yellow 20s 5    SLS ball toss on airex - 4# med ball  3 directions 2 10 Each direction                                       Therapeutic Activity (20523)      Ladders      Plyos      Dynamic Balance      Alter G TM walk/jog   1:1 x5; XS shorts, 60% WB         DN + estim  10 min Patellar tendon; inferior joint line       Access Code: [email or web address]  URL: [email or web address]  Date: 03/28/2022  Prepared by: Corina Pulling    Exercises  Prone Knee Extension Hang - 2-3 x daily - 7 x weekly - 1 sets - 5-10 min hold  Seated Knee Extension Stretch with Chair - 2-3 x daily - 7 x weekly - 1 sets - 5-10 min hold  Seated Table Hamstring Stretch - 2-3 x daily - 7 x weekly - 1 sets - 5 reps - 20s-30s hold  Long Sitting Quad Set - 2-3 x daily - 7 x weekly - 3 sets - 10 reps  Prone Terminal Knee Extension - 2-3 x daily - 7 x weekly - 1-2 sets - 10 reps - 10s hold  Supine Knee Flexion Wall Slide - 2-3 x daily - 7 x weekly - 1 sets - 10 reps - 10s hold      Therapeutic Exercise and NMR EXR  [x] (55753) Provided verbal/tactile cueing for activities related to strengthening, flexibility, endurance, ROM for improvements in LE, proximal hip, and core control with self care, mobility, lifting, ambulation. [x] (64202) Provided verbal/tactile cueing for activities related to improving balance, coordination, kinesthetic sense, posture, motor skill, proprioception  to assist with LE, proximal hip, and core control in self care, mobility, lifting, ambulation and eccentric single leg control.      NMR and Therapeutic Activities:    [x] (53568 or 68272) Provided verbal/tactile cueing for activities related to improving balance, coordination, kinesthetic sense, posture, motor skill, proprioception and motor activation to allow for proper function of core, proximal hip and LE with self care and ADLs and functional mobility.   [] (82553) Gait Re-education- Provided training and instruction to the patient for proper LE, core and proximal hip recruitment and positioning and eccentric body weight control with ambulation re-education including up and down stairs     Home Exercise Program:    [x] (13441) Reviewed/Progressed HEP activities related to strengthening, flexibility, endurance, ROM of core, proximal hip and LE for functional self-care, mobility, lifting and ambulation/stair navigation   [] (80720)Reviewed/Progressed HEP activities related to improving balance, coordination, kinesthetic sense, posture, motor skill, proprioception of core, proximal hip and LE for self care, mobility, lifting, and ambulation/stair navigation      Manual Treatments:  PROM / STM / Oscillations-Mobs:  G-I, II, III, IV (PA's, Inf., Post.)  [x] (78083) Provided manual therapy to mobilize LE, proximal hip and/or LS spine soft tissue/joints for the purpose of modulating pain, promoting relaxation,  increasing ROM, reducing/eliminating soft tissue swelling/inflammation/restriction, improving soft tissue extensibility and allowing for proper ROM for normal function with self care, mobility, lifting and ambulation. poke with Dr. Danielle Zimmerman regarding the use of Dry Needling      Dry needling manual therapy: consisted on the placement of 6 needles in the following muscles:  L patellar tendon, inferior joint line, lateral distal quad, ITB. A 40 mm needle was inserted, piston, rotated, and coned to produce intramuscular mobilization. These techniques were used to restore functional range of motion, reduce muscle spasm and induce healing in the corresponding musculature. (14041)  Clean Technique was utilized today while applying Dry needling treatment. The treatment sites where cleaned with 70% solution of  isopropyl alcohol . The PT washed their hands and utilized treatment gloves along with hand  prior to inserting the needles. All needles where removed and discarded in the appropriate sharps container. MD has given verbal and/or written approval for this treatment. Attended low frequency (1-20Hz) electrical stimulation was utilized in conjunction with Dry Needling:  the Estim was manipulated between all above needles for a period of 10 min. at 4-6 volts.   The low frequency electrical stimulation was used to help reduce muscle spasm and help to interrupt /[place] the pain cycle. (90732)        Modalities:     [x] GAME READY (VASO)- for significant edema, swelling, pain control. - 10 minutes    Charges:  Timed Code Treatment Minutes: 37   Total Treatment Minutes: 47      [] EVAL (LOW) 21584 (typically 20 minutes face-to-face)  [] EVAL (MOD) 01345 (typically 30 minutes face-to-face)  [] EVAL (HIGH) 18296 (typically 45 minutes face-to-face)  [] RE-EVAL     [x] ZG(98662) x 1    [x] DRY NEEDLE 1 OR 2 MUSCLES  [] NMR (59239) x     [] DRY NEEDLE 3+ MUSCLES  [x] Manual (83258) x 1    [] TA (53879) x     [] Mech Traction (59363)  [x] ES(attended) (75894)     [] ES (un) (01169):   [] VASO (91619)  [] Other:      GOALS:  Patient stated goal: \"get my leg straight and be able to walk without crutch\"  [] Progressing: [] Met: [x] Not Met: [] Adjusted     Therapist goals for Patient:   Short Term Goals: To be achieved in: 2 weeks  1. Independent in HEP and progression per patient tolerance, in order to prevent re-injury. [] Progressing: [x] Met: [] Not Met: [] Adjusted  2. Patient will have a decrease in pain to facilitate improvement in movement, function, and ADLs as indicated by Functional Deficits. [x] Progressing: [] Met: [] Not Met: [] Adjusted     Long Term Goals: To be achieved in: 8 weeks  1. Disability index score of 10% or less for the LEFS to assist with reaching prior level of function. [x] Progressing: [] Met: [] Not Met: [] Adjusted  2. Patient will demonstrate increased AROM to full L knee to allow for proper joint functioning as indicated by patients Functional Deficits. [] Progressing: [] Met: [x] Not Met: [] Adjusted  3. Patient will demonstrate an increase in strength to good proximal hip strength and control, within 5lb HHD in LE to allow for proper functional mobility as indicated by patients Functional Deficits.    [x] Progressing: [] Met: [] Not Met: [] Adjusted  4. Patient will return to daily functional activities without increased symptoms or restriction. [] Progressing: [x] Met: [] Not Met: [] Adjusted  5. Pt will be able to ambulate without crutch and with full knee extension and quad recruitment without increased symptoms or restrictions. [x] Progressing: [] Met: [] Not Met: [] Adjusted      ASSESSMENT: Global tightness around knee, addressed w/ DN, manual, and stretching followed by light functional quad act exercises emphasizing terminal knee extension. Patient does feel like he is seeing good improvements in global L knee mobility with these techniques. Feels like he is maintaining the improvements in mobility for longer periods of time in between sessions. Decreasing frequency of visits 2/2 insurance limitations. Continue to address mobility as long as DN and manual continue to help, hold if improvement stops. Return to Play: (if applicable)   [x]  Stage 1: Intro to Strength   []  Stage 2: Return to Run and Strength   []  Stage 3: Return to Jump and Strength   []  Stage 4: Dynamic Strength and Agility   []  Stage 5: Sport Specific Training     []  Ready to Return to Play, Meets All Above Stages   []  Not Ready for Return to Sports   Comments:            Treatment/Activity Tolerance:  [x] Patient tolerated treatment well [] Patient limited by fatique  [] Patient limited by pain  [] Patient limited by other medical complications  [] Other:     Overall Progression Towards Functional goals/ Treatment Progress Update:  [] Patient is progressing as expected towards functional goals listed. [x] Progression is slowed due to complexities/Impairments listed. [] Progression has been slowed due to co-morbidities.   [] Plan just implemented, too soon to assess goals progression <30days   [] Goals require adjustment due to lack of progress  [] Patient is not progressing as expected and requires additional follow up with physician  [] Other    Prognosis for POC: [x] Good [] Fair  [] Poor    Patient requires continued skilled intervention: [x] Yes  [] No        PLAN: 1x/week x4 weeks  [x] Continue per plan of care [] Alter current plan (see comments)  [] Plan of care initiated [] Hold pending MD visit [] Discharge    Electronically signed by: Amelia Durant, PT, DPT, MS, SCS      Note: If patient does not return for scheduled/recommended follow up visits, this note will serve as a discharge from care along with the most recent update on progress.

## 2022-11-03 ENCOUNTER — APPOINTMENT (OUTPATIENT)
Dept: PHYSICAL THERAPY | Age: 22
End: 2022-11-03
Payer: COMMERCIAL

## 2022-11-08 ENCOUNTER — APPOINTMENT (OUTPATIENT)
Dept: PHYSICAL THERAPY | Age: 22
End: 2022-11-08
Payer: COMMERCIAL

## 2022-11-10 ENCOUNTER — HOSPITAL ENCOUNTER (OUTPATIENT)
Dept: PHYSICAL THERAPY | Age: 22
Setting detail: THERAPIES SERIES
Discharge: HOME | End: 2022-11-10
Payer: COMMERCIAL

## 2022-11-10 NOTE — FLOWSHEET NOTE
[facility]    Physical Therapy  Cancellation/No-show Note  Patient Name:  Dianne Tobar  :  2000   Date:  11/10/2022  Cancelled visits to date: 1  No-shows to date: 1    For today's appointment patient:  [x]  Cancelled  []  Rescheduled appointment  []  No-show     Reason given by patient:  [x]  Patient ill  []  Conflicting appointment  []  No transportation    []  Conflict with work  []  No reason given  []  Other:     Comments:      Electronically signed by:  Senia Esquivel, PT, PT

## 2022-11-15 ENCOUNTER — HOSPITAL ENCOUNTER (OUTPATIENT)
Dept: PHYSICAL THERAPY | Age: 22
Setting detail: THERAPIES SERIES
End: 2022-11-15
Payer: COMMERCIAL

## 2022-11-22 ENCOUNTER — HOSPITAL ENCOUNTER (OUTPATIENT)
Dept: PHYSICAL THERAPY | Age: 22
Setting detail: THERAPIES SERIES
Discharge: HOME | End: 2022-11-22
Payer: COMMERCIAL

## 2022-11-22 PROCEDURE — 97140 MANUAL THERAPY 1/> REGIONS: CPT

## 2022-11-22 PROCEDURE — 97110 THERAPEUTIC EXERCISES: CPT

## 2022-11-23 NOTE — FLOWSHEET NOTE
[place] [address] [address]  Phone: [phone] Fax: [phone]    Physical Therapy Treatment Note/ Progress Report:     Date:  2022    Patient Name:  Lexis Roldan    :  2000  MRN: [id number]  Restrictions/Precautions:    Medical/Treatment Diagnosis Information:  Diagnosis: Z48.89 encounter for other specified surgical aftercare  Treatment Diagnosis: M25.562, M25.662, M62.559, R53.1, U72.2  Insurance/Certification information:  PT Insurance Information: [employer] [address]; DED NOT MET; DN NOT COVERED; 80/20%; 60 VISITS/YEAR, 7 USED  Physician Information:  Referring Practitioner: David Dacosta  Plan of care signed (Y/N):     Date of Patient follow up with Physician:      Progress Report: []  Yes  [x]  No     Date Range for reporting period:  Beginnin2022  Ending:      Progress report due (10 Rx/or 30 days whichever is less):     Recertification due (POC duration/ or 90 days whichever is less):     Visit # Insurance Allowable Auth Needed   23 49 []Yes   []No     Latex Allergy:  [x]NO      []YES  Preferred Language for Healthcare:   [x]English       []other:  Functional Scale: LEFS: 62/80  Date assessed: 10/4/2022    Pain level:  2/10     SUBJECTIVE: Feels like knee is doing pretty well currently, walked to class on it this week and states it feels stronger. OBJECTIVE:   Observation:      Test measurements:      ROM LEFT RIGHT   Knee ext -2 0   Knee Flex 122 135   Strength  LEFT RIGHT   HIP Abductors 36.3 37.3   Knee EXT (quad) 53.1 62.9   Knee Flex (HS) 40.6 54.0      SL STS  R: 19  L: 15     Y balance              Curtsy:3 3 inches short              Lateral: 2 inches short              Fwd: 4 inches short           RESTRICTIONS/PRECAUTIONS: s/p L ACLr w/ patellar tendon graft,  DOS: 2021 L knee scope with scar tissue debridement on 3/14/22.      Exercises/Interventions:     Therapeutic Ex (89200)-06 Sets/sec Reps Notes/CUES   BIKE 5 min         Step up  20 12''               Incline calf stretch 30s 5    Prone quad stretch 30'' 5    Dave stretch/sidelying hip flexor stretch 30'' 3          Band side stepping 2 15 [place]   March c TKE c band 2 10 Blue band behind knee   Slide lunge c TKE c band 1 15 Blue band behind knee   RDL  15 Black KB   Reverse lunge w/slider 2 10    BOSU Lunge 10sec 10 KB fwd                     Leg press - 2-1 ecc - 85# 3 12    Retro Slide lunge 5sec 10 Cued to pull straight at top                           Manual Intervention (16950)- 14      Knee mobs/PROM   min End range ext/flexion w/OP   Tib/Fem Mobs  8 min Posteromedial, posterolateral; prone tibial for end range flexion   Patella Mobs  6 min Restricted superiorly/inferiorly   IASTM/STM  Incisions, patella framing   Hip IR  stretching  4 min  L   Hip LAD  4 min          NMR Re-education (71789)    CUES NEEDED   Biofeedback w/CC TKE - 2 pl  10/10; 24 mA stim; 50 mA target   Sport cord march - yellow  2 10 Fwd/bwd; each direction   SLS balance - SC around back of knee cueing TKE - yellow 20s 5    SLS ball toss on airex - 4# med ball  3 directions 2 10 Each direction                                       Therapeutic Activity (18304)      Ladders      Plyos      Dynamic Balance      Alter G TM walk/jog   1:1 x5; XS shorts, 60% WB         DN + estim  10 min Patellar tendon; inferior joint line       Access Code: [email or web address]  URL: [email or web address]  Date: 03/28/2022  Prepared by: King Travis    Exercises  Prone Knee Extension Hang - 2-3 x daily - 7 x weekly - 1 sets - 5-10 min hold  Seated Knee Extension Stretch with Chair - 2-3 x daily - 7 x weekly - 1 sets - 5-10 min hold  Seated Table Hamstring Stretch - 2-3 x daily - 7 x weekly - 1 sets - 5 reps - 20s-30s hold  Long Sitting Quad Set - 2-3 x daily - 7 x weekly - 3 sets - 10 reps  Prone Terminal Knee Extension - 2-3 x daily - 7 x weekly - 1-2 sets - 10 reps - 10s hold  Supine Knee Flexion Wall Slide - 2-3 x daily - 7 x weekly - 1 sets - 10 reps - 10s hold      Therapeutic Exercise and NMR EXR  [x] (98711) Provided verbal/tactile cueing for activities related to strengthening, flexibility, endurance, ROM for improvements in LE, proximal hip, and core control with self care, mobility, lifting, ambulation. [x] (52250) Provided verbal/tactile cueing for activities related to improving balance, coordination, kinesthetic sense, posture, motor skill, proprioception  to assist with LE, proximal hip, and core control in self care, mobility, lifting, ambulation and eccentric single leg control.      NMR and Therapeutic Activities:    [x] (28165 or 62845) Provided verbal/tactile cueing for activities related to improving balance, coordination, kinesthetic sense, posture, motor skill, proprioception and motor activation to allow for proper function of core, proximal hip and LE with self care and ADLs and functional mobility.   [] (63889) Gait Re-education- Provided training and instruction to the patient for proper LE, core and proximal hip recruitment and positioning and eccentric body weight control with ambulation re-education including up and down stairs     Home Exercise Program:    [x] (39541) Reviewed/Progressed HEP activities related to strengthening, flexibility, endurance, ROM of core, proximal hip and LE for functional self-care, mobility, lifting and ambulation/stair navigation   [] (90059)Reviewed/Progressed HEP activities related to improving balance, coordination, kinesthetic sense, posture, motor skill, proprioception of core, proximal hip and LE for self care, mobility, lifting, and ambulation/stair navigation      Manual Treatments:  PROM / STM / Oscillations-Mobs:  G-I, II, III, IV (PA's, Inf., Post.)  [x] (47713) Provided manual therapy to mobilize LE, proximal hip and/or LS spine soft tissue/joints for the purpose of modulating pain, promoting relaxation, increasing ROM, reducing/eliminating soft tissue swelling/inflammation/restriction, improving soft tissue extensibility and allowing for proper ROM for normal function with self care, mobility, lifting and ambulation. poke with Dr. Monty Gamez regarding the use of Dry Needling      Dry needling manual therapy: consisted on the placement of 6 needles in the following muscles:  L patellar tendon, inferior joint line, lateral distal quad, ITB. A 40 mm needle was inserted, piston, rotated, and coned to produce intramuscular mobilization. These techniques were used to restore functional range of motion, reduce muscle spasm and induce healing in the corresponding musculature. (23908)  Clean Technique was utilized today while applying Dry needling treatment. The treatment sites where cleaned with 70% solution of  isopropyl alcohol . The PT washed their hands and utilized treatment gloves along with hand  prior to inserting the needles. All needles where removed and discarded in the appropriate sharps container. MD has given verbal and/or written approval for this treatment. Attended low frequency (1-20Hz) electrical stimulation was utilized in conjunction with Dry Needling:  the Estim was manipulated between all above needles for a period of 10 min. at 4-6 volts. The low frequency electrical stimulation was used to help reduce muscle spasm and help to interrupt /[place] the pain cycle. (96157)        Modalities:     [x] GAME READY (VASO)- for significant edema, swelling, pain control.  - 10 minutes    Charges:  Timed Code Treatment Minutes: 47   Total Treatment Minutes: 47      [] EVAL (LOW) 37231 (typically 20 minutes face-to-face)  [] EVAL (MOD) 76285 (typically 30 minutes face-to-face)  [] EVAL (HIGH) 63571 (typically 45 minutes face-to-face)  [] RE-EVAL     [x] DJ(42685) x 2  [] DRY NEEDLE 1 OR 2 MUSCLES  [] NMR (31224) x     [] DRY NEEDLE 3+ MUSCLES  [x] Manual (06056) x 1    [] TA (10047) x     [] [facility] Traction (00872)  [] ES(attended) (45098)     [] ES (un) (30545):   [] VASO (68218)  [] Other:      GOALS:  Patient stated goal: \"get my leg straight and be able to walk without crutch\"  [] Progressing: [] Met: [x] Not Met: [] Adjusted     Therapist goals for Patient:   Short Term Goals: To be achieved in: 2 weeks  1. Independent in HEP and progression per patient tolerance, in order to prevent re-injury. [] Progressing: [x] Met: [] Not Met: [] Adjusted  2. Patient will have a decrease in pain to facilitate improvement in movement, function, and ADLs as indicated by Functional Deficits. [x] Progressing: [] Met: [] Not Met: [] Adjusted     Long Term Goals: To be achieved in: 8 weeks  1. Disability index score of 10% or less for the LEFS to assist with reaching prior level of function. [x] Progressing: [] Met: [] Not Met: [] Adjusted  2. Patient will demonstrate increased AROM to full L knee to allow for proper joint functioning as indicated by patients Functional Deficits. [] Progressing: [] Met: [x] Not Met: [] Adjusted  3. Patient will demonstrate an increase in strength to good proximal hip strength and control, within 5lb HHD in LE to allow for proper functional mobility as indicated by patients Functional Deficits. [x] Progressing: [] Met: [] Not Met: [] Adjusted  4. Patient will return to daily functional activities without increased symptoms or restriction. [] Progressing: [x] Met: [] Not Met: [] Adjusted  5. Pt will be able to ambulate without crutch and with full knee extension and quad recruitment without increased symptoms or restrictions. [x] Progressing: [] Met: [] Not Met: [] Adjusted      ASSESSMENT: decreased mobility restriction allowed for increased loading of LLE today, focusing on general strength and single leg stability. Pt return as able following break, discussed continued strengthening for endurance and functional use of available mobility.      Return to Play: (if applicable)   [x]  Stage 1: Intro to Strength   []  Stage 2: Return to Run and Strength   []  Stage 3: Return to Jump and Strength   []  Stage 4: Dynamic Strength and Agility   []  Stage 5: Sport Specific Training     []  Ready to Return to Play, Meets All Above Stages   []  Not Ready for Return to Sports   Comments:            Treatment/Activity Tolerance:  [x] Patient tolerated treatment well [] Patient limited by fatique  [] Patient limited by pain  [] Patient limited by other medical complications  [] Other:     Overall Progression Towards Functional goals/ Treatment Progress Update:  [] Patient is progressing as expected towards functional goals listed. [x] Progression is slowed due to complexities/Impairments listed. [] Progression has been slowed due to co-morbidities. [] Plan just implemented, too soon to assess goals progression <30days   [] Goals require adjustment due to lack of progress  [] Patient is not progressing as expected and requires additional follow up with physician  [] Other    Prognosis for POC: [x] Good [] Fair  [] Poor    Patient requires continued skilled intervention: [x] Yes  [] No        PLAN: 1x/week x4 weeks  [x] Continue per plan of care [] Alter current plan (see comments)  [] Plan of care initiated [] Hold pending MD visit [] Discharge    Electronically signed by: aMrie Zimmerman PT, DPT      Note: If patient does not return for scheduled/recommended follow up visits, this note will serve as a discharge from care along with the most recent update on progress.

## 2022-12-06 ENCOUNTER — HOSPITAL ENCOUNTER (OUTPATIENT)
Dept: PHYSICAL THERAPY | Age: 22
Setting detail: THERAPIES SERIES
Discharge: HOME | End: 2022-12-06
Payer: COMMERCIAL

## 2022-12-06 PROCEDURE — 97110 THERAPEUTIC EXERCISES: CPT

## 2022-12-06 NOTE — FLOWSHEET NOTE
[place] [address] [facility]  Phone: [phone] Fax: [phone]    Physical Therapy Treatment Note/ Progress Report:     Date:  2022    Patient Name:  Ugo Sevilla    :  2000  MRN: [id number]  Restrictions/Precautions:    Medical/Treatment Diagnosis Information:  Diagnosis: Z48.89 encounter for other specified surgical aftercare  Treatment Diagnosis: M25.562, M25.662, M62.559, R53.1, L75.0  Insurance/Certification information:  PT Insurance Information: Elida Manjinder; DED NOT MET; DN NOT COVERED; 80/20%; 60 VISITS/YEAR, 7 USED  Physician Information:  Referring Practitioner: Farhana Lisa  Plan of care signed (Y/N):     Date of Patient follow up with Physician:      Progress Report: []  Yes  [x]  No     Date Range for reporting period:  Beginnin2022  Ending:      Progress report due (10 Rx/or 30 days whichever is less):     Recertification due (POC duration/ or 90 days whichever is less):     Visit # Insurance Allowable Auth Needed   57 17 []Yes   []No     Latex Allergy:  [x]NO      []YES  Preferred Language for Healthcare:   [x]English       []other:  Functional Scale: LEFS: 62/80  Date assessed: 10/4/2022    Pain level:  2/10     SUBJECTIVE: States he saw doctor while home for thanksgiving, encouraged to continue strengthening. Pt states he may be transferring after this semester but is unsure. States he has started doing more leg workouts on his own in the gym.      OBJECTIVE:   Observation:      Test measurements:      ROM LEFT RIGHT   Knee ext -2 0   Knee Flex 127 135   Strength  LEFT RIGHT   HIP Abductors 36.3 37.3   Knee EXT (quad) 53.1 62.9   Knee Flex (HS) 40.6 54.0      SL STS  R: 19  L: 15     Y balance              Curtsy:3 3 inches short              Lateral: 2 inches short              Fwd: 4 inches short           RESTRICTIONS/PRECAUTIONS: s/p L ACLr w/ patellar tendon graft,  DOS: 2021 L knee scope with scar tissue debridement on 3/14/22. Exercises/Interventions:     Therapeutic Ex (63932)-30 Sets/sec Reps Notes/CUES   BIKE 5 min         Step up  20 12''   BFR 8'  Leg ext         Incline calf stretch 30s 5    Prone quad stretch 30'' 5    Dave stretch/sidelying hip flexor stretch 30'' 3          Band side stepping 2 15 White AK   March c TKE c band 2 10 Blue band behind knee   Slide lunge c TKE c band 1 15 Blue band behind knee   RDL 3 15 Black KB   Reverse lunge w/slider 2 10    BOSU Lunge 10sec 10 KB fwd                     Leg press - 2-1 ecc - 85# 3 12    Retro Slide lunge 5sec 10 Cued to pull straight at top                           Manual Intervention (11106)- 14      Knee mobs/PROM   min End range ext/flexion w/OP   Tib/Fem Mobs  8 min Posteromedial, posterolateral; prone tibial for end range flexion   Patella Mobs  6 min Restricted superiorly/inferiorly   IASTM/STM  Incisions, patella framing   Hip IR  stretching  4 min  L   Hip LAD  4 min          NMR Re-education (09090)    CUES NEEDED   Biofeedback w/CC TKE - 2 pl  10/10; 24 mA stim; 50 mA target   Sport cord march - yellow  2 10 Fwd/bwd; each direction   SLS balance - SC around back of knee cueing TKE - yellow 20s 5    SLS ball toss on airex - 4# med ball  3 directions 2 10 Each direction                                       Therapeutic Activity (22738)      Ladders      Plyos      Dynamic Balance      Alter G TM walk/jog   1:1 x5; XS shorts, 60% WB         DN + estim  10 min Patellar tendon; inferior joint line       Access Code: [email or web address]  URL: [email or web address]  Date: 03/28/2022  Prepared by: Noah Maier    Exercises  Prone Knee Extension Hang - 2-3 x daily - 7 x weekly - 1 sets - 5-10 min hold  Seated Knee Extension Stretch with Chair - 2-3 x daily - 7 x weekly - 1 sets - 5-10 min hold  Seated Table Hamstring Stretch - 2-3 x daily - 7 x weekly - 1 sets - 5 reps - 20s-30s hold  Long Sitting Quad Set - 2-3 x daily - 7 x weekly - 3 sets - 10 reps  Prone Terminal Knee Extension - 2-3 x daily - 7 x weekly - 1-2 sets - 10 reps - 10s hold  Supine Knee Flexion Wall Slide - 2-3 x daily - 7 x weekly - 1 sets - 10 reps - 10s hold      Therapeutic Exercise and NMR EXR  [x] (96200) Provided verbal/tactile cueing for activities related to strengthening, flexibility, endurance, ROM for improvements in LE, proximal hip, and core control with self care, mobility, lifting, ambulation. [x] (47367) Provided verbal/tactile cueing for activities related to improving balance, coordination, kinesthetic sense, posture, motor skill, proprioception  to assist with LE, proximal hip, and core control in self care, mobility, lifting, ambulation and eccentric single leg control.      NMR and Therapeutic Activities:    [x] (05457 or 71019) Provided verbal/tactile cueing for activities related to improving balance, coordination, kinesthetic sense, posture, motor skill, proprioception and motor activation to allow for proper function of core, proximal hip and LE with self care and ADLs and functional mobility.   [] (65819) Gait Re-education- Provided training and instruction to the patient for proper LE, core and proximal hip recruitment and positioning and eccentric body weight control with ambulation re-education including up and down stairs     Home Exercise Program:    [x] (32229) Reviewed/Progressed HEP activities related to strengthening, flexibility, endurance, ROM of core, proximal hip and LE for functional self-care, mobility, lifting and ambulation/stair navigation   [] (55417)Reviewed/Progressed HEP activities related to improving balance, coordination, kinesthetic sense, posture, motor skill, proprioception of core, proximal hip and LE for self care, mobility, lifting, and ambulation/stair navigation      Manual Treatments:  PROM / STM / Oscillations-Mobs:  G-I, II, III, IV (PA's, Inf., Post.)  [x] (38025) Provided manual therapy to mobilize LE, proximal hip and/or LS spine soft tissue/joints for the purpose of modulating pain, promoting relaxation,  increasing ROM, reducing/eliminating soft tissue swelling/inflammation/restriction, improving soft tissue extensibility and allowing for proper ROM for normal function with self care, mobility, lifting and ambulation. Modalities:     [x] GAME READY (VASO)- for significant edema, swelling, pain control. - 10 minutes    Charges:  Timed Code Treatment Minutes: 47   Total Treatment Minutes: 47      [] EVAL (LOW) 39381 (typically 20 minutes face-to-face)  [] EVAL (MOD) 38763 (typically 30 minutes face-to-face)  [] EVAL (HIGH) 68813 (typically 45 minutes face-to-face)  [] RE-EVAL     [x] DB(24975) x 2  [] DRY NEEDLE 1 OR 2 MUSCLES  [] NMR (43019) x     [] DRY NEEDLE 3+ MUSCLES  [x] Manual (86874) x 1    [] TA (56562) x     [] Mech Traction (37356)  [] ES(attended) (18237)     [] ES (un) (29491):   [] VASO (31240)  [] Other:      GOALS:  Patient stated goal: \"get my leg straight and be able to walk without crutch\"  [] Progressing: [] Met: [x] Not Met: [] Adjusted     Therapist goals for Patient:   Short Term Goals: To be achieved in: 2 weeks  1. Independent in HEP and progression per patient tolerance, in order to prevent re-injury. [] Progressing: [x] Met: [] Not Met: [] Adjusted  2. Patient will have a decrease in pain to facilitate improvement in movement, function, and ADLs as indicated by Functional Deficits. [x] Progressing: [] Met: [] Not Met: [] Adjusted     Long Term Goals: To be achieved in: 8 weeks  1. Disability index score of 10% or less for the LEFS to assist with reaching prior level of function. [x] Progressing: [] Met: [] Not Met: [] Adjusted  2. Patient will demonstrate increased AROM to full L knee to allow for proper joint functioning as indicated by patients Functional Deficits. [] Progressing: [] Met: [x] Not Met: [] Adjusted  3.  Patient will demonstrate an increase in strength to good proximal hip strength and control, within 5lb HHD in LE to allow for proper functional mobility as indicated by patients Functional Deficits. [x] Progressing: [] Met: [] Not Met: [] Adjusted  4. Patient will return to daily functional activities without increased symptoms or restriction. [] Progressing: [x] Met: [] Not Met: [] Adjusted  5. Pt will be able to ambulate without crutch and with full knee extension and quad recruitment without increased symptoms or restrictions. [x] Progressing: [] Met: [] Not Met: [] Adjusted      ASSESSMENT: decreased mobility restriction allowed for increased loading of LLE today, focusing on general strength and single leg stability. Discussed importance of pt continuing to strengthen leg for optimal function. Return to Play: (if applicable)   [x]  Stage 1: Intro to Strength   []  Stage 2: Return to Run and Strength   []  Stage 3: Return to Jump and Strength   []  Stage 4: Dynamic Strength and Agility   []  Stage 5: Sport Specific Training     []  Ready to Return to Play, Meets All Above Stages   []  Not Ready for Return to Sports   Comments:            Treatment/Activity Tolerance:  [x] Patient tolerated treatment well [] Patient limited by fatique  [] Patient limited by pain  [] Patient limited by other medical complications  [] Other:     Overall Progression Towards Functional goals/ Treatment Progress Update:  [] Patient is progressing as expected towards functional goals listed. [x] Progression is slowed due to complexities/Impairments listed. [] Progression has been slowed due to co-morbidities.   [] Plan just implemented, too soon to assess goals progression <30days   [] Goals require adjustment due to lack of progress  [] Patient is not progressing as expected and requires additional follow up with physician  [] Other    Prognosis for POC: [x] Good [] Fair  [] Poor    Patient requires continued skilled intervention: [x] Yes  [] No        PLAN: 1x/week x4 weeks  [x] Continue per plan of care [] Alter current plan (see comments)  [] Plan of care initiated [] Hold pending MD visit [] Discharge    Electronically signed by: Elo Pleitez PT, DPT      Note: If patient does not return for scheduled/recommended follow up visits, this note will serve as a discharge from care along with the most recent update on progress.

## (undated) NOTE — LETTER
Date & Time: 3/4/2020, 4:38 PM  Patient: Lulu Aida  Encounter Provider(s):    Manuela Littlejohn       To Whom It May Concern:    Dipesh Schwartz was seen and treated in our department on 3/4/2020.  He should not participate in gym/sports until 3

## (undated) NOTE — LETTER
ASTHMA ACTION PLAN for Shanta Soliman     : 2000     Date: 8/3/2021  Provider:  Bella Tomlinson MD  Phone for doctor or clinic: [place] [address], [address]  [phone]